# Patient Record
Sex: FEMALE | Race: WHITE | NOT HISPANIC OR LATINO | Employment: OTHER | URBAN - METROPOLITAN AREA
[De-identification: names, ages, dates, MRNs, and addresses within clinical notes are randomized per-mention and may not be internally consistent; named-entity substitution may affect disease eponyms.]

---

## 2017-01-06 ENCOUNTER — OFFICE VISIT (OUTPATIENT)
Dept: FAMILY MEDICINE CLINIC | Facility: CLINIC | Age: 52
End: 2017-01-06

## 2017-01-06 VITALS
TEMPERATURE: 98.5 F | SYSTOLIC BLOOD PRESSURE: 128 MMHG | HEART RATE: 100 BPM | BODY MASS INDEX: 29.26 KG/M2 | HEIGHT: 62 IN | DIASTOLIC BLOOD PRESSURE: 78 MMHG | WEIGHT: 159 LBS

## 2017-01-06 DIAGNOSIS — F41.9 ANXIETY: ICD-10-CM

## 2017-01-06 DIAGNOSIS — I10 BENIGN ESSENTIAL HYPERTENSION: ICD-10-CM

## 2017-01-06 DIAGNOSIS — IMO0001 UNCONTROLLED TYPE 2 DIABETES MELLITUS WITHOUT COMPLICATION, WITHOUT LONG-TERM CURRENT USE OF INSULIN: Primary | ICD-10-CM

## 2017-01-06 DIAGNOSIS — G89.29 CHRONIC BILATERAL LOW BACK PAIN WITHOUT SCIATICA: ICD-10-CM

## 2017-01-06 DIAGNOSIS — M54.50 CHRONIC BILATERAL LOW BACK PAIN WITHOUT SCIATICA: ICD-10-CM

## 2017-01-06 DIAGNOSIS — R11.0 NAUSEA: ICD-10-CM

## 2017-01-06 PROCEDURE — 99214 OFFICE O/P EST MOD 30 MIN: CPT | Performed by: FAMILY MEDICINE

## 2017-01-06 RX ORDER — GLIMEPIRIDE 4 MG/1
4 TABLET ORAL
Qty: 30 TABLET | Refills: 5 | Status: SHIPPED | OUTPATIENT
Start: 2017-01-06 | End: 2017-07-14 | Stop reason: SDUPTHER

## 2017-01-06 RX ORDER — NORTRIPTYLINE HYDROCHLORIDE 25 MG/1
25 CAPSULE ORAL NIGHTLY
COMMUNITY
End: 2017-07-14 | Stop reason: SDUPTHER

## 2017-01-06 RX ORDER — ONDANSETRON HYDROCHLORIDE 8 MG/1
8 TABLET, FILM COATED ORAL EVERY 8 HOURS PRN
Qty: 15 TABLET | Refills: 2 | Status: SHIPPED | OUTPATIENT
Start: 2017-01-06 | End: 2019-02-15

## 2017-01-06 RX ORDER — DIAZEPAM 10 MG/1
10 TABLET ORAL EVERY 8 HOURS PRN
Qty: 75 TABLET | Refills: 4 | Status: SHIPPED | OUTPATIENT
Start: 2017-01-06 | End: 2017-07-14 | Stop reason: SDUPTHER

## 2017-01-06 RX ORDER — HYDROCODONE BITARTRATE AND ACETAMINOPHEN 10; 325 MG/1; MG/1
0.5 TABLET ORAL EVERY 4 HOURS PRN
COMMUNITY
End: 2018-12-24

## 2017-01-06 NOTE — MR AVS SNAPSHOT
Raquel Espitia   1/6/2017 3:45 PM   Office Visit    Dept Phone:  [phone]   Encounter #:  [id number]    Provider:  Vikram Stewart MD   Department:  [facility]                Your Full Care Plan              Today's Medication Changes          These changes are accurate as of: 1/6/17  4:24 PM.  If you have any questions, ask your nurse or doctor.               Medication(s)that have changed:     diazePAM 10 MG tablet   Commonly known as:  VALIUM   Take 1 tablet by mouth Every 8 (Eight) Hours As Needed for anxiety.   What changed:  See the new instructions.   Changed by:  Vikram Stewart MD       HYDROcodone-acetaminophen  MG per tablet   Commonly known as:  NORCO   Take 0.5 tablets by mouth Every 4 (Four) Hours As Needed for moderate pain (4-6).   What changed:  Another medication with the same name was removed. Continue taking this medication, and follow the directions you see here.   Changed by:  Vikram Stewart MD         Stop taking medication(s)listed here:     diclofenac 50 MG EC tablet   Commonly known as:  VOLTAREN   Stopped by:  Vikram Stewart MD                Where to Get Your Medications      You can get these medications from any pharmacy     Bring a paper prescription for each of these medications     diazePAM 10 MG tablet                  Your Updated Medication List          This list is accurate as of: 1/6/17  4:24 PM.  Always use your most recent med list.                diazePAM 10 MG tablet   Commonly known as:  VALIUM   Take 1 tablet by mouth Every 8 (Eight) Hours As Needed for anxiety.       gabapentin 600 MG tablet   Commonly known as:  NEURONTIN   Take 1 tablet by mouth 3 (three) times a day.       glimepiride 4 MG tablet   Commonly known as:  AMARYL   Take 1 tablet by mouth Every Morning Before Breakfast.       HYDROcodone-acetaminophen  MG per tablet   Commonly known as:   NORCO       metFORMIN 500 MG tablet   Commonly known as:  GLUCOPHAGE       nortriptyline 25 MG capsule   Commonly known as:  PAMELOR       omeprazole 40 MG capsule   Commonly known as:  priLOSEC       ondansetron 8 MG tablet   Commonly known as:  ZOFRAN   Take 1 tablet by mouth Every 8 (Eight) Hours As Needed for nausea or vomiting.       promethazine-codeine 6.25-10 MG/5ML syrup   Commonly known as:  PHENERGAN with CODEINE   Take 5 mL by mouth Every 4 (Four) Hours As Needed for cough.       sennosides-docusate sodium 8.6-50 MG tablet   Commonly known as:  SENOKOT-S   Take 1 tablet by mouth 2 (Two) Times a Day Before Meals.       verapamil  MG CR tablet   Commonly known as:  CALAN-SR   Take 1 tablet by mouth every night.               Instructions     None    Patient Instructions History      Upcoming Appointments     Visit Type Date Time Department    OFFICE VISIT 2017  3:45 PM [facility] [employer] Signup     [facility] allows you to send messages to your doctor, view your test results, renew your prescriptions, schedule appointments, and more. To sign up, go to [employer] and click on the Sign Up Now link in the New User? box. Enter your [facility] Activation Code exactly as it appears below along with the last four digits of your Social Security Number and your Date of Birth () to complete the sign-up process. If you do not sign up before the expiration date, you must request a new code.    [facility] Activation Code: [email or web address]  Expires: 2017  4:24 PM    If you have questions, you can email [email or web address] or call [phone] to talk to our [facility] staff. Remember, [facility] is NOT to be used for urgent needs. For medical emergencies, dial 911.               Other Info from Your Visit           Allergies     No Known Allergies      Reason for Visit     Anxiety refills on valium    Nausea refills on zofran    Diabetes refills on glimepiride     "Hypertension refills on verapamil    Back Pain Pt is in a pain clinic now, she is now on Norco 10/325 and she is suppose to break in half and take every 4 hours, also on Nortriptyline 25, and she thinks she might be on SOMA 350 she does not have this on her.        Vital Signs     Blood Pressure Pulse Temperature Height Weight Body Mass Index    128/78 100 98.5 °F (36.9 °C) 62\" (157.5 cm) 159 lb (72.1 kg) 29.08 kg/m2    Smoking Status                   Current Every Day Smoker             "

## 2017-01-06 NOTE — PROGRESS NOTES
Subjective   Raquel Espitia is a 51 y.o. female    HPI Comments: Patient presents today for recheck associated with her multiple medical problems including type 2 diabetes mellitus, hypertension, chronic back pain, nausea and chronic anxiety.  She is now being treated at the pain treatment center and requests a referral to an endocrinologist for her diabetes.  She is due for refills on multiple medications.  She also informs me that her mother who was a patient here, Nelia Abrams, recently passed away.  She had been in a nursing home after a stroke and apparently had another stroke.    Anxiety   Symptoms include nausea. Patient reports no chest pain, confusion, decreased concentration, dizziness, nervous/anxious behavior, palpitations, shortness of breath or suicidal ideas.       Nausea   Associated symptoms include nausea. Pertinent negatives include no abdominal pain, arthralgias, chest pain, coughing, diaphoresis, fatigue, headaches, myalgias, numbness, rash, vomiting or weakness.   Diabetes   Pertinent negatives for hypoglycemia include no confusion, dizziness, headaches, nervousness/anxiousness or tremors. Pertinent negatives for diabetes include no chest pain, no fatigue, no polydipsia, no polyphagia, no polyuria and no weakness.   Hypertension   Associated symptoms include anxiety. Pertinent negatives include no chest pain, headaches, palpitations or shortness of breath.   Back Pain   Pertinent negatives include no abdominal pain, chest pain, headaches, numbness or weakness.       The following portions of the patient's history were reviewed and updated as appropriate: allergies, current medications, past social history and problem list    Review of Systems   Constitutional: Negative.  Negative for appetite change, diaphoresis, fatigue and unexpected weight change.   Eyes: Negative for visual disturbance.   Respiratory: Negative.  Negative for cough, chest tightness and shortness of breath.    Cardiovascular:  Negative for chest pain, palpitations and leg swelling.   Gastrointestinal: Positive for nausea. Negative for abdominal pain, diarrhea and vomiting.   Endocrine: Negative for polydipsia, polyphagia and polyuria.   Musculoskeletal: Positive for back pain. Negative for arthralgias, gait problem and myalgias.   Skin: Negative for color change and rash.   Neurological: Negative for dizziness, tremors, syncope, weakness, light-headedness, numbness and headaches.   Psychiatric/Behavioral: Negative for agitation, behavioral problems, confusion, decreased concentration, dysphoric mood, sleep disturbance and suicidal ideas. The patient is not nervous/anxious.        Objective     Vitals:    01/06/17 1543   BP: 128/78   Pulse: 100   Temp: 98.5 °F (36.9 °C)       Physical Exam   Constitutional: She is oriented to person, place, and time. She appears well-developed and well-nourished.   Neck: Neck supple. No JVD present. No thyromegaly present.   Cardiovascular: Normal rate, regular rhythm, normal heart sounds, intact distal pulses and normal pulses.    No murmur heard.  Pulmonary/Chest: Effort normal and breath sounds normal. No respiratory distress.   Abdominal: Soft. Bowel sounds are normal. There is no hepatosplenomegaly. There is no tenderness.   Musculoskeletal: She exhibits no edema.        Lumbar back: She exhibits decreased range of motion, tenderness, bony tenderness and pain. She exhibits no swelling, no deformity and no spasm.   Lymphadenopathy:     She has no cervical adenopathy.   Neurological: She is alert and oriented to person, place, and time. She has normal reflexes. No sensory deficit.   Skin: Skin is warm and dry. She is not diaphoretic.   Psychiatric: Her speech is normal and behavior is normal. Her mood appears anxious. She exhibits a depressed mood.   Nursing note and vitals reviewed.      Assessment/Plan   Problem List Items Addressed This Visit        Cardiovascular and Mediastinum    Benign essential  hypertension    Relevant Medications    verapamil SR (CALAN-SR) 180 MG CR tablet       Endocrine    DM (diabetes mellitus), type 2, uncontrolled - Primary    Relevant Medications    glimepiride (AMARYL) 4 MG tablet    Other Relevant Orders    Ambulatory Referral to Endocrinology (Completed)       Nervous and Auditory    Lumbago    Relevant Medications    HYDROcodone-acetaminophen (NORCO)  MG per tablet      Other Visit Diagnoses     Anxiety        Relevant Medications    nortriptyline (PAMELOR) 25 MG capsule    diazePAM (VALIUM) 10 MG tablet    Nausea        Relevant Medications    ondansetron (ZOFRAN) 8 MG tablet

## 2017-07-14 ENCOUNTER — OFFICE VISIT (OUTPATIENT)
Dept: FAMILY MEDICINE CLINIC | Facility: CLINIC | Age: 52
End: 2017-07-14

## 2017-07-14 VITALS
SYSTOLIC BLOOD PRESSURE: 120 MMHG | OXYGEN SATURATION: 98 % | TEMPERATURE: 97.7 F | HEIGHT: 62 IN | DIASTOLIC BLOOD PRESSURE: 78 MMHG | HEART RATE: 88 BPM | WEIGHT: 175 LBS | BODY MASS INDEX: 32.2 KG/M2

## 2017-07-14 DIAGNOSIS — M54.50 CHRONIC MIDLINE LOW BACK PAIN WITHOUT SCIATICA: ICD-10-CM

## 2017-07-14 DIAGNOSIS — Z71.6 TOBACCO ABUSE COUNSELING: ICD-10-CM

## 2017-07-14 DIAGNOSIS — I10 BENIGN ESSENTIAL HYPERTENSION: ICD-10-CM

## 2017-07-14 DIAGNOSIS — G89.29 CHRONIC MIDLINE LOW BACK PAIN WITHOUT SCIATICA: ICD-10-CM

## 2017-07-14 DIAGNOSIS — F41.9 ANXIETY: ICD-10-CM

## 2017-07-14 DIAGNOSIS — K21.9 GASTROESOPHAGEAL REFLUX DISEASE, ESOPHAGITIS PRESENCE NOT SPECIFIED: ICD-10-CM

## 2017-07-14 DIAGNOSIS — Z72.0 TOBACCO ABUSE: Primary | ICD-10-CM

## 2017-07-14 DIAGNOSIS — IMO0001 UNCONTROLLED TYPE 2 DIABETES MELLITUS WITHOUT COMPLICATION, WITHOUT LONG-TERM CURRENT USE OF INSULIN: ICD-10-CM

## 2017-07-14 PROCEDURE — 99214 OFFICE O/P EST MOD 30 MIN: CPT | Performed by: FAMILY MEDICINE

## 2017-07-14 PROCEDURE — 99406 BEHAV CHNG SMOKING 3-10 MIN: CPT | Performed by: FAMILY MEDICINE

## 2017-07-14 RX ORDER — DIAZEPAM 10 MG/1
10 TABLET ORAL EVERY 8 HOURS PRN
Qty: 75 TABLET | Refills: 5 | Status: SHIPPED | OUTPATIENT
Start: 2017-07-14 | End: 2018-01-22 | Stop reason: SDUPTHER

## 2017-07-14 RX ORDER — GABAPENTIN 600 MG/1
600 TABLET ORAL 3 TIMES DAILY
Qty: 90 TABLET | Refills: 5 | Status: SHIPPED | OUTPATIENT
Start: 2017-07-14 | End: 2018-01-22 | Stop reason: SDUPTHER

## 2017-07-14 RX ORDER — OMEPRAZOLE 40 MG/1
40 CAPSULE, DELAYED RELEASE ORAL DAILY
Qty: 30 CAPSULE | Refills: 11 | Status: SHIPPED | OUTPATIENT
Start: 2017-07-14 | End: 2018-08-16 | Stop reason: SDUPTHER

## 2017-07-14 RX ORDER — GLIMEPIRIDE 4 MG/1
4 TABLET ORAL
Qty: 30 TABLET | Refills: 11 | Status: SHIPPED | OUTPATIENT
Start: 2017-07-14 | End: 2018-01-22 | Stop reason: SDUPTHER

## 2017-07-14 RX ORDER — NORTRIPTYLINE HYDROCHLORIDE 25 MG/1
25 CAPSULE ORAL NIGHTLY
Qty: 30 CAPSULE | Refills: 11 | Status: SHIPPED | OUTPATIENT
Start: 2017-07-14 | End: 2018-08-16 | Stop reason: SDUPTHER

## 2017-07-14 NOTE — PROGRESS NOTES
Subjective   Raquel Espitia is a 52 y.o. female    HPI Comments: Patient presents today for 6 month recheck regarding chronic anxiety, diabetic neuropathy, type 2 diabetes mellitus, hypertension and GERD.  She is due for refills on her medications.  She is followed at the pain treatment center for chronic back pain.  She has signed a controlled substances agreement with us today.  Her Brandon is reviewed and is appropriate at this time.  She reports no adverse effects with her current medication.    3 minute discussion with patient regarding smoking cessation.  Long-term complications of smoking are discussed including lung issues with COPD/emphysema and lung cancer and cardiovascular disease with coronary artery disease, peripheral artery disease and stroke.  Adjuncts for smoking cessation including nicotine replacement with patches, lozenges or gum as well as prescription medications including bupropion and Chantix are offered to the patient.    Anxiety   Symptoms include nervous/anxious behavior. Patient reports no chest pain, confusion, decreased concentration, dizziness, nausea, palpitations, shortness of breath or suicidal ideas.       Diabetes   Hypoglycemia symptoms include nervousness/anxiousness. Pertinent negatives for hypoglycemia include no confusion, dizziness, headaches or tremors. Pertinent negatives for diabetes include no chest pain, no fatigue, no polydipsia, no polyphagia, no polyuria and no weakness.   Hypertension   Associated symptoms include anxiety. Pertinent negatives include no chest pain, headaches, palpitations or shortness of breath.   Heartburn   She reports no abdominal pain, no chest pain, no coughing or no nausea. Pertinent negatives include no fatigue.       The following portions of the patient's history were reviewed and updated as appropriate: allergies, current medications, past social history and problem list    Review of Systems   Constitutional: Negative.  Negative for  appetite change, diaphoresis, fatigue and unexpected weight change.   Eyes: Negative for visual disturbance.   Respiratory: Negative.  Negative for cough, chest tightness and shortness of breath.    Cardiovascular: Negative for chest pain, palpitations and leg swelling.   Gastrointestinal: Negative.  Negative for abdominal distention, abdominal pain, diarrhea, nausea and vomiting.        Patient experiencing heartburn/acid reflux     Endocrine: Negative for polydipsia, polyphagia and polyuria.   Musculoskeletal: Positive for back pain. Negative for arthralgias, gait problem and myalgias.   Skin: Negative for color change and rash.   Neurological: Negative for dizziness, tremors, syncope, weakness, light-headedness, numbness and headaches.   Psychiatric/Behavioral: Positive for dysphoric mood and sleep disturbance. Negative for agitation, behavioral problems, confusion, decreased concentration and suicidal ideas. The patient is nervous/anxious.        Objective     Vitals:    07/14/17 1501   BP: 120/78   Pulse: 88   Temp: 97.7 °F (36.5 °C)   SpO2: 98%       Physical Exam   Constitutional: She is oriented to person, place, and time. She appears well-developed and well-nourished.   HENT:   Head: Normocephalic.   Mouth/Throat: Oropharynx is clear and moist.   Eyes: Conjunctivae are normal. Pupils are equal, round, and reactive to light.   Neck: Neck supple. No JVD present. No thyromegaly present.   Cardiovascular: Normal rate, regular rhythm, normal heart sounds, intact distal pulses and normal pulses.    No murmur heard.  Pulmonary/Chest: Effort normal and breath sounds normal. No respiratory distress.   Abdominal: Soft. Bowel sounds are normal. There is no hepatosplenomegaly. There is no tenderness.   Musculoskeletal: She exhibits no edema.        Lumbar back: She exhibits decreased range of motion, tenderness, bony tenderness and pain. She exhibits no swelling, no deformity and no spasm.   Lymphadenopathy:     She  has no cervical adenopathy.   Neurological: She is alert and oriented to person, place, and time. She has normal reflexes. No sensory deficit.   Skin: Skin is warm and dry. No rash noted. She is not diaphoretic.   Psychiatric: She has a normal mood and affect. Her behavior is normal.   Nursing note and vitals reviewed.      Assessment/Plan   Problem List Items Addressed This Visit        Cardiovascular and Mediastinum    Benign essential hypertension    Relevant Medications    verapamil SR (CALAN-SR) 180 MG CR tablet       Digestive    Gastroesophageal reflux disease    Relevant Medications    omeprazole (priLOSEC) 40 MG capsule       Endocrine    DM (diabetes mellitus), type 2, uncontrolled    Relevant Medications    glimepiride (AMARYL) 4 MG tablet       Nervous and Auditory    Lumbago       Other    Tobacco abuse - Primary    Relevant Medications    varenicline (CHANTIX SHANDA) 0.5 MG X 11 & 1 MG X 42 tablet    Tobacco abuse counseling    Anxiety    Relevant Medications    diazePAM (VALIUM) 10 MG tablet    nortriptyline (PAMELOR) 25 MG capsule

## 2017-07-15 PROBLEM — K21.9 GASTROESOPHAGEAL REFLUX DISEASE: Status: ACTIVE | Noted: 2017-07-15

## 2018-01-22 ENCOUNTER — OFFICE VISIT (OUTPATIENT)
Dept: FAMILY MEDICINE CLINIC | Facility: CLINIC | Age: 53
End: 2018-01-22

## 2018-01-22 VITALS
WEIGHT: 185 LBS | DIASTOLIC BLOOD PRESSURE: 76 MMHG | HEART RATE: 103 BPM | OXYGEN SATURATION: 99 % | RESPIRATION RATE: 16 BRPM | BODY MASS INDEX: 34.04 KG/M2 | HEIGHT: 62 IN | SYSTOLIC BLOOD PRESSURE: 118 MMHG

## 2018-01-22 DIAGNOSIS — K21.9 GASTROESOPHAGEAL REFLUX DISEASE, ESOPHAGITIS PRESENCE NOT SPECIFIED: ICD-10-CM

## 2018-01-22 DIAGNOSIS — J40 BRONCHITIS: ICD-10-CM

## 2018-01-22 DIAGNOSIS — IMO0001 UNCONTROLLED TYPE 2 DIABETES MELLITUS WITHOUT COMPLICATION, WITHOUT LONG-TERM CURRENT USE OF INSULIN: ICD-10-CM

## 2018-01-22 DIAGNOSIS — I10 BENIGN ESSENTIAL HYPERTENSION: ICD-10-CM

## 2018-01-22 DIAGNOSIS — F41.9 ANXIETY: Primary | ICD-10-CM

## 2018-01-22 PROCEDURE — 99214 OFFICE O/P EST MOD 30 MIN: CPT | Performed by: FAMILY MEDICINE

## 2018-01-22 RX ORDER — GLIMEPIRIDE 4 MG/1
4 TABLET ORAL
Qty: 30 TABLET | Refills: 11 | Status: SHIPPED | OUTPATIENT
Start: 2018-01-22 | End: 2018-01-22 | Stop reason: SDUPTHER

## 2018-01-22 RX ORDER — AMOXICILLIN 500 MG/1
1000 CAPSULE ORAL 3 TIMES DAILY
Qty: 30 CAPSULE | Refills: 0 | Status: SHIPPED | OUTPATIENT
Start: 2018-01-22 | End: 2018-08-16

## 2018-01-22 RX ORDER — GABAPENTIN 600 MG/1
600 TABLET ORAL 3 TIMES DAILY
Qty: 90 TABLET | Refills: 5 | Status: SHIPPED | OUTPATIENT
Start: 2018-01-22 | End: 2018-12-24

## 2018-01-22 RX ORDER — DEXTROMETHORPHAN HYDROBROMIDE AND PROMETHAZINE HYDROCHLORIDE 15; 6.25 MG/5ML; MG/5ML
5 SYRUP ORAL 4 TIMES DAILY PRN
Qty: 240 ML | Refills: 0 | Status: SHIPPED | OUTPATIENT
Start: 2018-01-22 | End: 2019-02-15

## 2018-01-22 RX ORDER — GLIMEPIRIDE 4 MG/1
4 TABLET ORAL
Qty: 30 TABLET | Refills: 11 | Status: SHIPPED | OUTPATIENT
Start: 2018-01-22 | End: 2018-08-16 | Stop reason: SDUPTHER

## 2018-01-22 RX ORDER — DIAZEPAM 10 MG/1
10 TABLET ORAL EVERY 8 HOURS PRN
Qty: 75 TABLET | Refills: 5 | Status: SHIPPED | OUTPATIENT
Start: 2018-01-22 | End: 2018-08-16 | Stop reason: SDUPTHER

## 2018-01-22 NOTE — PROGRESS NOTES
Subjective   Raquel Espitia is a 52 y.o. female    HPI Comments: Patient presents for 6 month recheck regarding hypertension, diabetes mellitus type 2, anxiety disorder, GERD and chronic back pain.  She continues to see pain management for opioid therapy.  Her Brandon is reviewed and is appropriate.  She is due for several refills today.  She complains of productive cough with chest congestion and occasional wheezing.  She continues to smoke cigarettes despite previous counseling regarding smoking cessation.  She denies fever, chills, arthralgias or myalgias.      The following portions of the patient's history were reviewed and updated as appropriate: allergies, current medications, past social history and problem list    Review of Systems   Constitutional: Negative.  Negative for appetite change, chills, diaphoresis, fatigue, fever and unexpected weight change.   HENT: Positive for congestion and postnasal drip. Negative for sinus pain, sore throat and voice change.    Eyes: Negative.  Negative for visual disturbance.   Respiratory: Positive for cough and wheezing. Negative for chest tightness and shortness of breath.    Cardiovascular: Negative for chest pain, palpitations and leg swelling.   Gastrointestinal: Negative.  Negative for abdominal pain, diarrhea, nausea and vomiting.   Endocrine: Negative for polydipsia, polyphagia and polyuria.   Genitourinary: Negative.    Musculoskeletal: Positive for back pain. Negative for arthralgias, gait problem and myalgias.   Skin: Negative for color change and rash.   Neurological: Negative for dizziness, tremors, syncope, weakness, light-headedness, numbness and headaches.   Psychiatric/Behavioral: Positive for dysphoric mood and sleep disturbance. Negative for agitation, behavioral problems, confusion, decreased concentration and suicidal ideas. The patient is nervous/anxious.        Objective     Vitals:    01/22/18 0850   BP: 118/76   Pulse: 103   Resp: 16   SpO2: 99%        Physical Exam   Constitutional: She is oriented to person, place, and time. She appears well-developed and well-nourished. No distress.   HENT:   Head: Normocephalic and atraumatic.   Nose: Nose normal.   Mouth/Throat: Oropharynx is clear and moist.   Neck: Neck supple. No JVD present. No thyromegaly present.   Cardiovascular: Normal rate, regular rhythm, normal heart sounds, intact distal pulses and normal pulses.    No murmur heard.  Pulmonary/Chest: Effort normal. No stridor. No respiratory distress. She has wheezes.   Abdominal: Soft. Bowel sounds are normal. There is no hepatosplenomegaly. There is no tenderness.   Musculoskeletal: She exhibits no edema.        Lumbar back: She exhibits decreased range of motion, tenderness, bony tenderness and pain. She exhibits no swelling, no deformity and no spasm.   Lymphadenopathy:     She has no cervical adenopathy.   Neurological: She is alert and oriented to person, place, and time. She has normal reflexes. No sensory deficit.   Skin: Skin is warm and dry. She is not diaphoretic.   Psychiatric: She has a normal mood and affect. Her behavior is normal.   Nursing note and vitals reviewed.      Assessment/Plan   Problem List Items Addressed This Visit        Cardiovascular and Mediastinum    Benign essential hypertension       Digestive    Gastroesophageal reflux disease       Endocrine    DM (diabetes mellitus), type 2, uncontrolled    Relevant Medications    glimepiride (AMARYL) 4 MG tablet    metFORMIN (GLUCOPHAGE) 1000 MG tablet       Other    Anxiety - Primary    Relevant Medications    diazePAM (VALIUM) 10 MG tablet      Other Visit Diagnoses     Bronchitis        Relevant Medications    promethazine-dextromethorphan (PROMETHAZINE-DM) 6.25-15 MG/5ML syrup

## 2018-07-26 ENCOUNTER — TELEPHONE (OUTPATIENT)
Dept: FAMILY MEDICINE CLINIC | Facility: CLINIC | Age: 53
End: 2018-07-26

## 2018-07-26 NOTE — TELEPHONE ENCOUNTER
----- Message from Didi Peguero sent at 7/26/2018  1:45 PM EDT -----  Contact: PT  NEED DIAZEPAM REFILL, OUT OF THEM TOMORROW    [facility] - [place], [place] - [address] [facility] - [phone]  - [phone] (Phone)  [phone] (Fax)    HAS APPT 8/16 WITH DR FRANZ

## 2018-08-16 ENCOUNTER — OFFICE VISIT (OUTPATIENT)
Dept: FAMILY MEDICINE CLINIC | Facility: CLINIC | Age: 53
End: 2018-08-16

## 2018-08-16 VITALS
WEIGHT: 183 LBS | HEIGHT: 62 IN | OXYGEN SATURATION: 98 % | SYSTOLIC BLOOD PRESSURE: 152 MMHG | TEMPERATURE: 98 F | DIASTOLIC BLOOD PRESSURE: 80 MMHG | HEART RATE: 96 BPM | BODY MASS INDEX: 33.68 KG/M2

## 2018-08-16 DIAGNOSIS — K21.9 GASTROESOPHAGEAL REFLUX DISEASE, ESOPHAGITIS PRESENCE NOT SPECIFIED: ICD-10-CM

## 2018-08-16 DIAGNOSIS — I10 BENIGN ESSENTIAL HYPERTENSION: ICD-10-CM

## 2018-08-16 DIAGNOSIS — IMO0001 UNCONTROLLED TYPE 2 DIABETES MELLITUS WITHOUT COMPLICATION, WITHOUT LONG-TERM CURRENT USE OF INSULIN: ICD-10-CM

## 2018-08-16 DIAGNOSIS — G47.00 INSOMNIA, UNSPECIFIED TYPE: ICD-10-CM

## 2018-08-16 DIAGNOSIS — F41.9 ANXIETY: Primary | ICD-10-CM

## 2018-08-16 LAB
GLUCOSE BLDC GLUCOMTR-MCNC: 269 MG/DL (ref 70–130)
HBA1C MFR BLD: 10 %

## 2018-08-16 PROCEDURE — 83036 HEMOGLOBIN GLYCOSYLATED A1C: CPT | Performed by: FAMILY MEDICINE

## 2018-08-16 PROCEDURE — 82962 GLUCOSE BLOOD TEST: CPT | Performed by: FAMILY MEDICINE

## 2018-08-16 PROCEDURE — 99214 OFFICE O/P EST MOD 30 MIN: CPT | Performed by: FAMILY MEDICINE

## 2018-08-16 RX ORDER — NORTRIPTYLINE HYDROCHLORIDE 25 MG/1
25 CAPSULE ORAL NIGHTLY
Qty: 30 CAPSULE | Refills: 11 | Status: SHIPPED | OUTPATIENT
Start: 2018-08-16 | End: 2022-01-01 | Stop reason: HOSPADM

## 2018-08-16 RX ORDER — GLIMEPIRIDE 4 MG/1
4 TABLET ORAL 2 TIMES DAILY WITH MEALS
Qty: 60 TABLET | Refills: 11 | Status: SHIPPED | OUTPATIENT
Start: 2018-08-16 | End: 2019-06-05 | Stop reason: SDUPTHER

## 2018-08-16 RX ORDER — OMEPRAZOLE 40 MG/1
40 CAPSULE, DELAYED RELEASE ORAL DAILY
Qty: 30 CAPSULE | Refills: 11 | Status: SHIPPED | OUTPATIENT
Start: 2018-08-16 | End: 2019-06-05 | Stop reason: SDUPTHER

## 2018-08-16 RX ORDER — DIAZEPAM 10 MG/1
10 TABLET ORAL EVERY 8 HOURS PRN
Qty: 75 TABLET | Refills: 5 | Status: SHIPPED | OUTPATIENT
Start: 2018-08-16 | End: 2019-03-07 | Stop reason: SDUPTHER

## 2018-08-16 NOTE — PROGRESS NOTES
Subjective   Raquel Espitia is a 53 y.o. female    Patient presents for 6 month recheck and refill of prescriptions but reports she has not been feeling well for the past 4-6 weeks.  She has had symptoms of mental confusion and lack of concentration.  Her blood sugars and blood pressure readings have been elevated.  She has been compliant with her medications although she did stop her verapamil several months ago.  Her hemoglobin A1c today is 10.0% with a random glucose of 269.  She reports that she is having a lot of stress which is not unusual for her.  She is afraid to drive because of her confusion episodes.  Extended discussion with the patient regarding the need for blood pressure and let sugar control.  She is very well aware of the ramifications of these diseases as she unfortunately had to witness her mother having strokes and basically becoming incapacitated and her last years of life.  These were due to hypertension and diabetes.  We discussed improving her diet significantly and beginning some exercise seriously.  We will restart her verapamil for hypertension and increase her glimepiride.  Other prescriptions will be refilled today.  We will see her back in 2-3 months.      Anxiety   Symptoms include nervous/anxious behavior. Patient reports no chest pain, confusion, decreased concentration, dizziness, nausea, palpitations, shortness of breath or suicidal ideas.       Diabetes   Hypoglycemia symptoms include nervousness/anxiousness. Pertinent negatives for hypoglycemia include no confusion, dizziness, headaches or tremors. Pertinent negatives for diabetes include no chest pain, no fatigue, no polydipsia, no polyphagia, no polyuria and no weakness.   Heartburn   She reports no abdominal pain, no chest pain, no coughing or no nausea. Pertinent negatives include no fatigue.   Hypertension   Associated symptoms include anxiety. Pertinent negatives include no chest pain, headaches, palpitations or shortness  of breath.       The following portions of the patient's history were reviewed and updated as appropriate: allergies, current medications, past social history and problem list    Review of Systems   Constitutional: Negative for appetite change, diaphoresis, fatigue and unexpected weight change.   Eyes: Negative for visual disturbance.   Respiratory: Negative for cough, chest tightness and shortness of breath.    Cardiovascular: Negative for chest pain, palpitations and leg swelling.   Gastrointestinal: Negative for abdominal distention, abdominal pain, diarrhea, nausea and vomiting.        Patient experiencing heartburn/acid reflux     Endocrine: Negative for polydipsia, polyphagia and polyuria.   Skin: Negative for color change and rash.   Neurological: Negative for dizziness, tremors, syncope, weakness, light-headedness, numbness and headaches.   Psychiatric/Behavioral: Positive for dysphoric mood and sleep disturbance. Negative for agitation, behavioral problems, confusion, decreased concentration and suicidal ideas. The patient is nervous/anxious.        Objective     Vitals:    08/16/18 1115   BP: 152/80   Pulse: 96   Temp: 98 °F (36.7 °C)   SpO2: 98%       Physical Exam   Constitutional: She is oriented to person, place, and time. She appears well-developed and well-nourished.   HENT:   Head: Normocephalic.   Mouth/Throat: Oropharynx is clear and moist.   Eyes: Pupils are equal, round, and reactive to light. Conjunctivae are normal.   Neck: Neck supple. No JVD present. No thyromegaly present.   Cardiovascular: Normal rate, regular rhythm, normal heart sounds, intact distal pulses and normal pulses.    No murmur heard.  Pulmonary/Chest: Effort normal and breath sounds normal. No respiratory distress.   Abdominal: Soft. Bowel sounds are normal. There is no hepatosplenomegaly. There is no tenderness.   Musculoskeletal: She exhibits no edema.   Lymphadenopathy:     She has no cervical adenopathy.   Neurological:  She is alert and oriented to person, place, and time. No sensory deficit.   Skin: Skin is warm and dry. No rash noted. She is not diaphoretic.   Psychiatric: She has a normal mood and affect. Her behavior is normal.   Nursing note and vitals reviewed.      Assessment/Plan   Problem List Items Addressed This Visit        Cardiovascular and Mediastinum    Benign essential hypertension    Relevant Medications    verapamil SR (CALAN-SR) 180 MG CR tablet       Digestive    Gastroesophageal reflux disease    Relevant Medications    omeprazole (priLOSEC) 40 MG capsule       Endocrine    DM (diabetes mellitus), type 2, uncontrolled (CMS/[facility])    Relevant Medications    glimepiride (AMARYL) 4 MG tablet    metFORMIN (GLUCOPHAGE) 1000 MG tablet    Other Relevant Orders    POC Glucose (Completed)    POC Glycosylated Hemoglobin (Hb A1C) (Completed)       Other    Anxiety - Primary    Relevant Medications    nortriptyline (PAMELOR) 25 MG capsule    diazePAM (VALIUM) 10 MG tablet      Other Visit Diagnoses     Insomnia, unspecified type

## 2018-11-15 ENCOUNTER — OFFICE VISIT (OUTPATIENT)
Dept: FAMILY MEDICINE CLINIC | Facility: CLINIC | Age: 53
End: 2018-11-15

## 2018-11-15 VITALS
HEIGHT: 62 IN | HEART RATE: 105 BPM | WEIGHT: 169 LBS | DIASTOLIC BLOOD PRESSURE: 68 MMHG | BODY MASS INDEX: 31.1 KG/M2 | SYSTOLIC BLOOD PRESSURE: 124 MMHG | OXYGEN SATURATION: 98 % | TEMPERATURE: 99.6 F

## 2018-11-15 DIAGNOSIS — I10 BENIGN ESSENTIAL HYPERTENSION: ICD-10-CM

## 2018-11-15 DIAGNOSIS — E11.9 TYPE 2 DIABETES MELLITUS WITHOUT COMPLICATION, WITHOUT LONG-TERM CURRENT USE OF INSULIN (HCC): Primary | ICD-10-CM

## 2018-11-15 LAB
GLUCOSE BLDC GLUCOMTR-MCNC: 324 MG/DL (ref 70–130)
HBA1C MFR BLD: 10 %

## 2018-11-15 PROCEDURE — 83036 HEMOGLOBIN GLYCOSYLATED A1C: CPT | Performed by: FAMILY MEDICINE

## 2018-11-15 PROCEDURE — 99213 OFFICE O/P EST LOW 20 MIN: CPT | Performed by: FAMILY MEDICINE

## 2018-11-15 PROCEDURE — 82962 GLUCOSE BLOOD TEST: CPT | Performed by: FAMILY MEDICINE

## 2018-11-15 RX ORDER — VERAPAMIL HYDROCHLORIDE 240 MG/1
240 TABLET, FILM COATED, EXTENDED RELEASE ORAL NIGHTLY
Qty: 30 TABLET | Refills: 5 | Status: CANCELLED | OUTPATIENT
Start: 2018-11-15

## 2018-11-15 RX ORDER — HYDROCODONE BITARTRATE AND ACETAMINOPHEN 10; 325 MG/1; MG/1
1 TABLET ORAL 4 TIMES DAILY PRN
Refills: 0 | Status: ON HOLD | COMMUNITY
Start: 2018-09-19 | End: 2022-01-01

## 2018-11-15 RX ORDER — RANITIDINE 150 MG/1
TABLET ORAL
Refills: 0 | COMMUNITY
Start: 2018-08-17 | End: 2019-11-22

## 2018-11-15 RX ORDER — TIZANIDINE 4 MG/1
4 TABLET ORAL 2 TIMES DAILY
Refills: 0 | COMMUNITY
Start: 2018-08-17 | End: 2019-07-09 | Stop reason: SDUPTHER

## 2018-11-16 NOTE — PROGRESS NOTES
Subjective   Raquel Espitia is a 53 y.o. female    Chief Complaint    High blood pressure  Uncontrolled diabetes        History of Present Illness   Patient presents today due to elevated blood glucose levels and elevated blood pressure levels despite taking all of her prescribed medications.  She admits that she is compliant with her diet and does not exercise. A1c today is 10.0% and random glucose is 324.    The following portions of the patient's history were reviewed and updated as appropriate: allergies, current medications, past social history and problem list    Review of Systems   Constitutional: Negative for appetite change, diaphoresis, fatigue and unexpected weight change.   Eyes: Negative for visual disturbance.   Respiratory: Negative for cough, chest tightness and shortness of breath.    Cardiovascular: Negative for chest pain, palpitations and leg swelling.   Gastrointestinal: Negative for diarrhea, nausea and vomiting.   Endocrine: Negative for polydipsia, polyphagia and polyuria.   Skin: Negative for color change and rash.   Neurological: Negative for dizziness, syncope, weakness, light-headedness, numbness and headaches.       Objective     Vitals:    11/15/18 0925   BP: 124/68   Pulse: 105   Temp: 99.6 °F (37.6 °C)   SpO2: 98%       Physical Exam   Constitutional: She appears well-developed and well-nourished.   Neck: Neck supple. No JVD present. No thyromegaly present.   Cardiovascular: Normal rate, regular rhythm, normal heart sounds, intact distal pulses and normal pulses.   No murmur heard.  Pulmonary/Chest: Effort normal and breath sounds normal. No respiratory distress.   Abdominal: Soft. Bowel sounds are normal. There is no hepatosplenomegaly. There is no tenderness.   Musculoskeletal: She exhibits no edema.   Lymphadenopathy:     She has no cervical adenopathy.   Neurological: No sensory deficit.   Skin: Skin is warm and dry. She is not diaphoretic.   Nursing note and vitals  reviewed.      Assessment/Plan   Problem List Items Addressed This Visit        Cardiovascular and Mediastinum    Benign essential hypertension      Other Visit Diagnoses     Type 2 diabetes mellitus without complication, without long-term current use of insulin (CMS/[facility])    -  Primary    Relevant Medications    Empagliflozin 25 MG tablet    Other Relevant Orders    POC Glycosylated Hemoglobin (Hb A1C) (Completed)    POC Glucose (Completed)        Cont current antihypertensives.  Follow up in 4 weeks

## 2018-12-24 ENCOUNTER — OFFICE VISIT (OUTPATIENT)
Dept: FAMILY MEDICINE CLINIC | Facility: CLINIC | Age: 53
End: 2018-12-24

## 2018-12-24 VITALS
SYSTOLIC BLOOD PRESSURE: 138 MMHG | DIASTOLIC BLOOD PRESSURE: 62 MMHG | HEART RATE: 94 BPM | HEIGHT: 69 IN | WEIGHT: 174 LBS | OXYGEN SATURATION: 98 % | TEMPERATURE: 97.4 F | BODY MASS INDEX: 25.77 KG/M2

## 2018-12-24 DIAGNOSIS — E11.65 UNCONTROLLED TYPE 2 DIABETES MELLITUS WITH HYPERGLYCEMIA (HCC): Primary | ICD-10-CM

## 2018-12-24 LAB
GLUCOSE BLDC GLUCOMTR-MCNC: 240 MG/DL (ref 70–130)
HBA1C MFR BLD: 8.9 %

## 2018-12-24 PROCEDURE — 82962 GLUCOSE BLOOD TEST: CPT | Performed by: FAMILY MEDICINE

## 2018-12-24 PROCEDURE — 83036 HEMOGLOBIN GLYCOSYLATED A1C: CPT | Performed by: FAMILY MEDICINE

## 2018-12-24 PROCEDURE — 99213 OFFICE O/P EST LOW 20 MIN: CPT | Performed by: FAMILY MEDICINE

## 2018-12-24 RX ORDER — GABAPENTIN 800 MG/1
800 TABLET ORAL 4 TIMES DAILY
Refills: 0 | Status: ON HOLD | COMMUNITY
Start: 2018-12-17 | End: 2020-11-26 | Stop reason: SDUPTHER

## 2018-12-24 NOTE — PROGRESS NOTES
Subjective   Raquel Espitia is a 53 y.o. female    Chief Complaint  Diabetes mellitus      History of Present Illness   Patient presents today for one-month follow-up after initiation of Jardiance 25 mg daily to her diabetes medication regimen.  She is also on metformin 1000 mg twice a day and glimepiride 4 mg twice a day.  She reports that her blood sugars seem to be somewhat better on home testing.  Office testing today shows her A1c at 8.9% down from 10% one month ago.  Her random glucose is 240 down from 324 one month ago.  She is given samples today of Synjardy 12.5/1000 mg and is to take 1 tablet twice a day and not take her own personal supply of metformin.  We will try to get preauthorization for the Jardiance either in the combination form or alone.      The following portions of the patient's history were reviewed and updated as appropriate: allergies, current medications, past social history and problem list    Review of Systems   Constitutional: Negative for appetite change, diaphoresis, fatigue and unexpected weight change.   Eyes: Negative for visual disturbance.   Respiratory: Negative for cough, chest tightness and shortness of breath.    Cardiovascular: Negative for chest pain, palpitations and leg swelling.   Gastrointestinal: Negative for diarrhea, nausea and vomiting.   Endocrine: Negative for polydipsia, polyphagia and polyuria.   Skin: Negative for color change and rash.   Neurological: Negative for dizziness, syncope, weakness, light-headedness, numbness and headaches.       Objective     Vitals:    12/24/18 0945   BP: 138/62   Pulse: 94   Temp: 97.4 °F (36.3 °C)   SpO2: 98%       Physical Exam   Constitutional: She is oriented to person, place, and time. She appears well-developed and well-nourished.   Neck: Neck supple. No JVD present. No thyromegaly present.   Cardiovascular: Normal rate, regular rhythm, normal heart sounds, intact distal pulses and normal pulses.   No murmur  heard.  Pulmonary/Chest: Effort normal and breath sounds normal. No respiratory distress.   Abdominal: Soft. Bowel sounds are normal. There is no hepatosplenomegaly. There is no tenderness.   Musculoskeletal: She exhibits no edema.   Lymphadenopathy:     She has no cervical adenopathy.   Neurological: She is alert and oriented to person, place, and time. No sensory deficit.   Skin: Skin is warm and dry. She is not diaphoretic.   Psychiatric: She has a normal mood and affect. Her behavior is normal.   Nursing note and vitals reviewed.      Assessment/Plan   Problem List Items Addressed This Visit        Endocrine    DM (diabetes mellitus), type 2, uncontrolled (CMS/HCC) - Primary    Relevant Medications    Empagliflozin-Metformin HCl ER 12.5-1000 MG tablet sustained-release 24 hour    Other Relevant Orders    POC Glycosylated Hemoglobin (Hb A1C) (Completed)    POC Glucose (Completed)

## 2019-01-08 ENCOUNTER — APPOINTMENT (OUTPATIENT)
Dept: GENERAL RADIOLOGY | Facility: HOSPITAL | Age: 54
End: 2019-01-08

## 2019-01-08 ENCOUNTER — HOSPITAL ENCOUNTER (EMERGENCY)
Facility: HOSPITAL | Age: 54
Discharge: HOME | End: 2019-01-08
Attending: EMERGENCY MEDICINE | Admitting: EMERGENCY MEDICINE

## 2019-01-08 VITALS
DIASTOLIC BLOOD PRESSURE: 64 MMHG | OXYGEN SATURATION: 99 % | HEART RATE: 70 BPM | RESPIRATION RATE: 17 BRPM | SYSTOLIC BLOOD PRESSURE: 123 MMHG | BODY MASS INDEX: 25.17 KG/M2 | WEIGHT: 168 LBS | TEMPERATURE: 98.7 F

## 2019-01-08 DIAGNOSIS — R07.9 CHEST PAIN, UNSPECIFIED TYPE: Primary | ICD-10-CM

## 2019-01-08 LAB
ALBUMIN SERPL-MCNC: 4.5 G/DL (ref 3.5–5)
ALBUMIN/GLOB SERPL: 1.6 G/DL (ref 1–2)
ALP SERPL-CCNC: 109 U/L (ref 38–126)
ALT SERPL W P-5'-P-CCNC: 17 U/L (ref 13–69)
ANION GAP SERPL CALCULATED.3IONS-SCNC: 10.9 MMOL/L (ref 10–20)
AST SERPL-CCNC: 20 U/L (ref 15–46)
BASOPHILS # BLD AUTO: 0.06 10*3/MM3 (ref 0–0.2)
BASOPHILS NFR BLD AUTO: 0.8 % (ref 0–2.5)
BILIRUB SERPL-MCNC: 0.6 MG/DL (ref 0.2–1.3)
BUN BLD-MCNC: 13 MG/DL (ref 7–20)
BUN/CREAT SERPL: 26 (ref 7.1–23.5)
CALCIUM SPEC-SCNC: 9.1 MG/DL (ref 8.4–10.2)
CHLORIDE SERPL-SCNC: 108 MMOL/L (ref 98–107)
CO2 SERPL-SCNC: 27 MMOL/L (ref 26–30)
CREAT BLD-MCNC: 0.5 MG/DL (ref 0.6–1.3)
DEPRECATED RDW RBC AUTO: 42.8 FL (ref 37–54)
EOSINOPHIL # BLD AUTO: 0.07 10*3/MM3 (ref 0–0.7)
EOSINOPHIL NFR BLD AUTO: 0.9 % (ref 0–7)
ERYTHROCYTE [DISTWIDTH] IN BLOOD BY AUTOMATED COUNT: 13.9 % (ref 11.5–14.5)
GFR SERPL CREATININE-BSD FRML MDRD: 129 ML/MIN/1.73
GLOBULIN UR ELPH-MCNC: 2.8 GM/DL
GLUCOSE BLD-MCNC: 334 MG/DL (ref 74–98)
HCT VFR BLD AUTO: 47.8 % (ref 37–47)
HGB BLD-MCNC: 16.2 G/DL (ref 12–16)
HOLD SPECIMEN: NORMAL
IMM GRANULOCYTES # BLD AUTO: 0.05 10*3/MM3 (ref 0–0.06)
IMM GRANULOCYTES NFR BLD AUTO: 0.7 % (ref 0–0.6)
LYMPHOCYTES # BLD AUTO: 0.93 10*3/MM3 (ref 0.6–3.4)
LYMPHOCYTES NFR BLD AUTO: 12.3 % (ref 10–50)
MCH RBC QN AUTO: 29 PG (ref 27–31)
MCHC RBC AUTO-ENTMCNC: 33.9 G/DL (ref 30–37)
MCV RBC AUTO: 85.7 FL (ref 81–99)
MONOCYTES # BLD AUTO: 0.37 10*3/MM3 (ref 0–0.9)
MONOCYTES NFR BLD AUTO: 4.9 % (ref 0–12)
NEUTROPHILS # BLD AUTO: 6.06 10*3/MM3 (ref 2–6.9)
NEUTROPHILS NFR BLD AUTO: 80.4 % (ref 37–80)
NRBC BLD AUTO-RTO: 0 /100 WBC (ref 0–0)
PLATELET # BLD AUTO: 254 10*3/MM3 (ref 130–400)
PMV BLD AUTO: 11.5 FL (ref 6–12)
POTASSIUM BLD-SCNC: 3.9 MMOL/L (ref 3.5–5.1)
PROT SERPL-MCNC: 7.3 G/DL (ref 6.3–8.2)
RBC # BLD AUTO: 5.58 10*6/MM3 (ref 4.2–5.4)
SODIUM BLD-SCNC: 142 MMOL/L (ref 137–145)
TROPONIN I SERPL-MCNC: <0.012 NG/ML (ref 0–0.03)
TROPONIN I SERPL-MCNC: <0.012 NG/ML (ref 0–0.03)
WBC NRBC COR # BLD: 7.54 10*3/MM3 (ref 4.8–10.8)
WHOLE BLOOD HOLD SPECIMEN: NORMAL
WHOLE BLOOD HOLD SPECIMEN: NORMAL

## 2019-01-08 PROCEDURE — 99283 EMERGENCY DEPT VISIT LOW MDM: CPT

## 2019-01-08 PROCEDURE — 80053 COMPREHEN METABOLIC PANEL: CPT | Performed by: EMERGENCY MEDICINE

## 2019-01-08 PROCEDURE — 85025 COMPLETE CBC W/AUTO DIFF WBC: CPT | Performed by: EMERGENCY MEDICINE

## 2019-01-08 PROCEDURE — 84484 ASSAY OF TROPONIN QUANT: CPT | Performed by: EMERGENCY MEDICINE

## 2019-01-08 PROCEDURE — 71046 X-RAY EXAM CHEST 2 VIEWS: CPT

## 2019-01-08 PROCEDURE — 93005 ELECTROCARDIOGRAM TRACING: CPT | Performed by: EMERGENCY MEDICINE

## 2019-01-08 RX ORDER — SODIUM CHLORIDE 0.9 % (FLUSH) 0.9 %
10 SYRINGE (ML) INJECTION AS NEEDED
Status: DISCONTINUED | OUTPATIENT
Start: 2019-01-08 | End: 2019-01-08 | Stop reason: HOSPADM

## 2019-01-08 NOTE — ED PROVIDER NOTES
Subjective   History of Present Illness   53F w/ hx of HTN, DM, asthma, tobacco abuse p/w CP.  Patient describes 2 days of intermittent, seconds to minutes long, dull pain in her left chest that radiates across her chest and into both arms.  Nothing seemed to make better nor worse.  She had some associated lightheadedness with this today.  Denies other symptoms. Had some relief with aspirin yesterday.     Review of Systems   Cardiovascular: Positive for chest pain.   All other systems reviewed and are negative.      Past Medical History:   Diagnosis Date   • Acute bronchitis    • Asthma    • Diabetes mellitus (CMS/HCC)    • Edema    • GERD (gastroesophageal reflux disease)    • Hypertension    • Impaired fasting glucose    • Low back pain    • Pain of left calf        No Known Allergies    Past Surgical History:   Procedure Laterality Date   • BACK SURGERY     •  SECTION     • CHOLECYSTECTOMY     • HAND SURGERY     • NECK SURGERY         Family History   Problem Relation Age of Onset   • Hypertension Mother    • Stroke Mother    • Cancer Father        Social History     Socioeconomic History   • Marital status:      Spouse name: Not on file   • Number of children: Not on file   • Years of education: Not on file   • Highest education level: Not on file   Tobacco Use   • Smoking status: Current Every Day Smoker     Packs/day: 1.00     Types: Cigarettes   • Smokeless tobacco: Never Used   • Tobacco comment: 2017   Substance and Sexual Activity   • Alcohol use: Yes     Comment: rare   • Drug use: No   • Sexual activity: Defer           Objective   Physical Exam   Constitutional: She is oriented to person, place, and time. She appears well-developed and well-nourished.  Non-toxic appearance. She does not appear ill. No distress.   Smells of tobacco smoke   HENT:   Head: Normocephalic.   Mouth/Throat: Oropharynx is clear and moist. No oropharyngeal exudate.   Eyes: Conjunctivae are normal. No  scleral icterus.   Neck: Neck supple. No tracheal deviation present.   Cardiovascular: Normal rate, regular rhythm, normal heart sounds and intact distal pulses. Exam reveals no gallop and no friction rub.   No murmur heard.  Pulses:       Radial pulses are 2+ on the right side, and 2+ on the left side.        Dorsalis pedis pulses are 2+ on the right side, and 2+ on the left side.        Posterior tibial pulses are 2+ on the right side, and 2+ on the left side.   Pulmonary/Chest: Effort normal and breath sounds normal. No stridor. No respiratory distress. She has no wheezes. She has no rales. She exhibits no tenderness.   Abdominal: Soft. She exhibits no distension and no mass. There is no tenderness. There is no rebound and no guarding. No hernia.   Musculoskeletal: She exhibits no edema or deformity.   Neurological: She is alert and oriented to person, place, and time.   Skin: Skin is warm and dry. She is not diaphoretic. No erythema. No pallor.   Psychiatric: She has a normal mood and affect. Her behavior is normal.   Nursing note and vitals reviewed.      Procedures           ED Course  ED Course as of Jan 08 1641   Tue Jan 08, 2019   1630 EKG: Interpreted by me. NSR w/ nl intervals, no rosmery/std. Low voltage. Abnormal EKG    [AI]      ED Course User Index  [AI] Cullen Webb MD                  MDM  53F here w/ chest pain. AFVSS. In terms of patients chest pain, considered possible ACS, PE, dissection, pntx, tamponade, PNA, esophageal etiologies. No risk factors for PE. Low suspicion for pntx, pna, tamponade. HEART score = 3    4:41 PM Labs, CXR, EKG are all reassuring. Pt states she feels well and is eager to go home but is willing to stay for a repeat troponin. Will order for 545pm and if reassuring, will d/c home with information to call and f/u w/ cardiology for outpatient stress testing.     6:30 PM Repeat troponin unremarkable. Pt has remained stable throughout her stay. Discussed return to care  precautions.     Final diagnoses:   Chest pain, unspecified type            Cullen Webb MD  01/08/19 6389

## 2019-01-09 ENCOUNTER — TRANSCRIBE ORDERS (OUTPATIENT)
Dept: FAMILY MEDICINE CLINIC | Facility: CLINIC | Age: 54
End: 2019-01-09

## 2019-01-09 ENCOUNTER — TELEPHONE (OUTPATIENT)
Dept: FAMILY MEDICINE CLINIC | Facility: CLINIC | Age: 54
End: 2019-01-09

## 2019-01-09 DIAGNOSIS — R07.9 CHEST PAIN, UNSPECIFIED TYPE: Primary | ICD-10-CM

## 2019-01-09 NOTE — TELEPHONE ENCOUNTER
----- Message from Jahaira Amaya sent at 1/9/2019  2:49 PM EST -----  Patient went to the [facility] ER yesterday with chest pains. She had an EKG and it was normal. She is still having chest pains, and they suggest she see a cardiologist. I entered an order for Cardiology to see ..  Thanks,  Jahaira..

## 2019-02-14 NOTE — PROGRESS NOTES
OFFICE VISIT  NOTE  [facility] CARDIOLOGY      Name: Raquel Espitia    Date: 2/15/2019  MRN:  [id number]  :  1965      REFERRING/PRIMARY PROVIDER:  Vikram Stewart*    Chief Complaint   Patient presents with   • Chest Pain       HPI: Raquel Espitia is a 53 y.o. female who presents today for new consultation for chest pain.  She has associated history of diabetes mellitus type 2, hypertension, tobacco abuse, GERD.  She was seen in the [facility] ER approximately one month ago with chest pain gradient across her chest, she describes it as a dull ache, radiating to both arms, associated with shortness of breath.  Pain occurs daily, usually at rest, described as squeezing and tightness of side and radiates to both shoulders and through to her back.  She has associated numbness and tingling in both arms and fingers.  Unsure when her last stress test was.  Positive family history of premature CAD in her mother and father.  Diabetes is controlled with oral medications.  Blood pressure at times is labile, she is on verapamil.  She has never tried cholesterol medicines, no recent lipids and computer.    Past Medical History:   Diagnosis Date   • Acute bronchitis    • Asthma    • Diabetes mellitus (CMS/HCC)    • Edema    • GERD (gastroesophageal reflux disease)    • Hypertension    • Impaired fasting glucose    • Low back pain    • Pain of left calf        Past Surgical History:   Procedure Laterality Date   • BACK SURGERY     •  SECTION     • CHOLECYSTECTOMY     • HAND SURGERY     • NECK SURGERY         Social History     Socioeconomic History   • Marital status: Legally      Spouse name: Not on file   • Number of children: Not on file   • Years of education: Not on file   • Highest education level: Not on file   Social Needs   • Financial resource strain: Not on file   • Food insecurity - worry: Not on file   • Food insecurity - inability: Not on file   • Transportation  needs - medical: Not on file   • Transportation needs - non-medical: Not on file   Occupational History   • Not on file   Tobacco Use   • Smoking status: Current Every Day Smoker     Packs/day: 1.00     Types: Cigarettes   • Smokeless tobacco: Never Used   • Tobacco comment: July 14, 2017   Substance and Sexual Activity   • Alcohol use: No     Frequency: Never     Comment: rare   • Drug use: No   • Sexual activity: Defer   Other Topics Concern   • Not on file   Social History Narrative   • Not on file       Family History   Problem Relation Age of Onset   • Hypertension Mother    • Stroke Mother    • Cancer Father         ROS:   Constitutional no fever,  no weight loss   Skin no rash, no subcutaneous nodules   Otolaryngeal no difficulty swallowing   Cardiovascular See HPI   Pulmonary no cough, no sputum production   Gastrointestinal no constipation, no diarrhea   Genitourinary no dysuria, no hematuria   Hematologic no easy bruisability, no abnormal bleeding   Musculoskeletal no muscle pain   Neurologic no dizziness, no falls         No Known Allergies      Current Outpatient Medications:   •  aspirin 81 MG tablet, Take 81 mg by mouth Daily., Disp: , Rfl:   •  Empagliflozin-Metformin HCl ER 12.5-1000 MG tablet sustained-release 24 hour, Take 1 tablet by mouth 2 (Two) Times a Day., Disp: 60 tablet, Rfl: 5  •  gabapentin (NEURONTIN) 800 MG tablet, Take 800 mg by mouth 3 (Three) Times a Day., Disp: , Rfl: 0  •  glimepiride (AMARYL) 4 MG tablet, Take 1 tablet by mouth 2 (Two) Times a Day With Meals. (Patient taking differently: Take 4 mg by mouth Daily.), Disp: 60 tablet, Rfl: 11  •  HYDROcodone-acetaminophen (NORCO) 7.5-325 MG per tablet, take 1 tablet by mouth four times a day if needed, Disp: , Rfl: 0  •  nortriptyline (PAMELOR) 25 MG capsule, Take 1 capsule by mouth Every Night., Disp: 30 capsule, Rfl: 11  •  omeprazole (priLOSEC) 40 MG capsule, Take 1 capsule by mouth Daily., Disp: 30 capsule, Rfl: 11  •  raNITIdine  "(ZANTAC) 150 MG tablet, take 1 tablet by mouth every morning AND CONTINUE TAKING PRILOSEC...  (REFER TO PRESCRIPTION NOTES)., Disp: , Rfl: 0  •  tiZANidine (ZANAFLEX) 4 MG tablet, Take 4 mg by mouth 2 (Two) Times a Day., Disp: , Rfl: 0  •  verapamil SR (CALAN-SR) 180 MG CR tablet, Take 1 tablet by mouth Every Night., Disp: 30 tablet, Rfl: 11  •  amoxicillin-clavulanate (AUGMENTIN) 875-125 MG per tablet, Take 1 tablet by mouth 2 (Two) Times a Day for 10 days., Disp: 20 tablet, Rfl: 0  •  diazePAM (VALIUM) 10 MG tablet, take 1 tablet by mouth every 8 hours if needed for anxiety, Disp: 75 tablet, Rfl: 5  No current facility-administered medications for this visit.     Vitals:    02/15/19 0903   BP: 140/74   BP Location: Right arm   Patient Position: Sitting   Pulse: 91   Weight: 78.9 kg (174 lb)   Height: 157.5 cm (62\")     Body mass index is 31.83 kg/m².    PHYSICAL EXAM:    General Appearance:   · well developed  · well nourished  HENT:   · oropharynx moist  · lips not cyanotic  Neck:  · thyroid not enlarged  · supple  Respiratory:  · no respiratory distress  · normal breath sounds  · no rales  Cardiovascular:  · no jugular venous distention  · regular rhythm  · apical impulse normal  · S1 normal, S2 normal  · no S3, no S4   · no murmur  · no rub, no thrill  · carotid pulses normal; no bruit  · pedal pulses normal  · lower extremity edema: none    Gastrointestinal:   · bowel sounds normal  · non-tender  · no hepatomegaly, no splenomegaly  Musculoskeletal:  · no clubbing of fingers.   · normocephalic, head atraumatic  Skin:   · warm, dry  Psychiatric:  · judgement and insight appropriate  · normal mood and affect    RESULTS:     ECG 12 Lead  Date/Time: 2/15/2019 9:25 AM  Performed by: Vikram Aguilar MD  Authorized by: Vikram Aguilar MD   Rhythm: sinus rhythm  Rate: normal  BPM: 91  QRS axis: normal    Clinical impression: normal ECG                   Labs:  Lab Results   Component Value Date    CHOL 220 (H) " 03/13/2019    TRIG 303 (H) 03/13/2019    HDL 40 03/13/2019     (H) 03/13/2019    AST 20 01/08/2019    ALT 17 01/08/2019     Lab Results   Component Value Date    HGBA1C 7.7 03/04/2019     No components found for: CREATINININE  eGFR Non  Amer   Date Value Ref Range Status   01/08/2019 129 >60 mL/min/1.73 Final   10/18/2016 75 >60 mL/min/1.73 Final         ASSESSMENT:  Problem List Items Addressed This Visit        Cardiovascular and Mediastinum    Benign essential hypertension       Endocrine    DM (diabetes mellitus), type 2, uncontrolled (CMS/HCC)       Other    Tobacco abuse      Other Visit Diagnoses     Precordial chest pain    -  Primary    Relevant Orders    Adult Transthoracic Echo Complete W/ Cont if Necessary Per Protocol (Completed)    Stress Test With Myocardial Perfusion (Completed)    Lipid Panel (Completed)    ECG 12 Lead (Completed)          PLAN:    1.  Chest pain:  Given her extensive risk factors of diabetes, hypertension, tobacco abuse, family history we'll proceed with exercise nuclear stress test and echocardiogram  The patient was instructed to call 911 if their chest pain worsens or is not relieved with rest or nitroglycerin.    2.  Tobacco abuse:  We discussed importance of complete tobacco cessation, assistance was offered, she is not ready at this time.  She tried Chantix unsuccessfully in the past.    3.  Diabetes mellitus type 2:  Check lipid profile  Goal A1c less than 7  Continue SGLT 2 inhibitor    4.  Hypertension:  Long-term goal blood pressure less than 130/80  Continue verapamil for now    Return to clinic in 3 months      Thank you for the opportunity to share in the care of your patient; please do not hesitate to call me with any questions.     Vikram Aguilar MD, [facility]  Office: [phone] [address] [facility]  [address] [place] [address]

## 2019-02-15 ENCOUNTER — CONSULT (OUTPATIENT)
Dept: CARDIOLOGY | Facility: CLINIC | Age: 54
End: 2019-02-15

## 2019-02-15 VITALS
HEART RATE: 91 BPM | BODY MASS INDEX: 32.02 KG/M2 | SYSTOLIC BLOOD PRESSURE: 140 MMHG | DIASTOLIC BLOOD PRESSURE: 74 MMHG | HEIGHT: 62 IN | WEIGHT: 174 LBS

## 2019-02-15 DIAGNOSIS — E11.65 UNCONTROLLED TYPE 2 DIABETES MELLITUS WITH HYPERGLYCEMIA (HCC): ICD-10-CM

## 2019-02-15 DIAGNOSIS — I10 BENIGN ESSENTIAL HYPERTENSION: ICD-10-CM

## 2019-02-15 DIAGNOSIS — Z72.0 TOBACCO ABUSE: ICD-10-CM

## 2019-02-15 DIAGNOSIS — R07.2 PRECORDIAL CHEST PAIN: Primary | ICD-10-CM

## 2019-02-15 PROCEDURE — 99204 OFFICE O/P NEW MOD 45 MIN: CPT | Performed by: INTERNAL MEDICINE

## 2019-02-15 PROCEDURE — 93000 ELECTROCARDIOGRAM COMPLETE: CPT | Performed by: INTERNAL MEDICINE

## 2019-03-04 ENCOUNTER — OFFICE VISIT (OUTPATIENT)
Dept: FAMILY MEDICINE CLINIC | Facility: CLINIC | Age: 54
End: 2019-03-04

## 2019-03-04 VITALS
OXYGEN SATURATION: 97 % | HEART RATE: 80 BPM | DIASTOLIC BLOOD PRESSURE: 82 MMHG | SYSTOLIC BLOOD PRESSURE: 128 MMHG | RESPIRATION RATE: 18 BRPM | WEIGHT: 172.4 LBS | BODY MASS INDEX: 31.53 KG/M2

## 2019-03-04 DIAGNOSIS — J40 BRONCHITIS: ICD-10-CM

## 2019-03-04 DIAGNOSIS — E11.65 UNCONTROLLED TYPE 2 DIABETES MELLITUS WITH HYPERGLYCEMIA (HCC): Primary | ICD-10-CM

## 2019-03-04 LAB
EXPIRATION DATE: NORMAL
HBA1C MFR BLD: 7.7 %
Lab: NORMAL

## 2019-03-04 PROCEDURE — 83036 HEMOGLOBIN GLYCOSYLATED A1C: CPT | Performed by: FAMILY MEDICINE

## 2019-03-04 PROCEDURE — 99213 OFFICE O/P EST LOW 20 MIN: CPT | Performed by: FAMILY MEDICINE

## 2019-03-04 RX ORDER — AMOXICILLIN AND CLAVULANATE POTASSIUM 875; 125 MG/1; MG/1
1 TABLET, FILM COATED ORAL 2 TIMES DAILY
Qty: 20 TABLET | Refills: 0 | Status: SHIPPED | OUTPATIENT
Start: 2019-03-04 | End: 2019-03-14
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
## 2019-03-07 DIAGNOSIS — F41.9 ANXIETY: ICD-10-CM

## 2019-03-08 RX ORDER — DIAZEPAM 10 MG/1
TABLET ORAL
Qty: 75 TABLET | Refills: 5 | Status: SHIPPED | OUTPATIENT
Start: 2019-03-08 | End: 2019-11-22 | Stop reason: SDUPTHER

## 2019-03-13 ENCOUNTER — HOSPITAL ENCOUNTER (OUTPATIENT)
Dept: CARDIOLOGY | Facility: HOSPITAL | Age: 54
Discharge: HOME | End: 2019-03-13

## 2019-03-13 ENCOUNTER — LAB (OUTPATIENT)
Dept: LAB | Facility: HOSPITAL | Age: 54
End: 2019-03-13

## 2019-03-13 VITALS
BODY MASS INDEX: 31.73 KG/M2 | DIASTOLIC BLOOD PRESSURE: 80 MMHG | SYSTOLIC BLOOD PRESSURE: 130 MMHG | HEART RATE: 89 BPM | HEIGHT: 62 IN | WEIGHT: 172.4 LBS

## 2019-03-13 DIAGNOSIS — R07.2 PRECORDIAL CHEST PAIN: ICD-10-CM

## 2019-03-13 LAB
ARTICHOKE IGE QN: 141 MG/DL (ref 0–130)
BH CV ECHO MEAS - AO ROOT AREA (BSA CORRECTED): 1.7
BH CV ECHO MEAS - AO ROOT AREA: 7.1 CM^2
BH CV ECHO MEAS - AO ROOT DIAM: 3 CM
BH CV ECHO MEAS - BSA(HAYCOCK): 1.9 M^2
BH CV ECHO MEAS - BSA: 1.8 M^2
BH CV ECHO MEAS - BZI_BMI: 31.5 KILOGRAMS/M^2
BH CV ECHO MEAS - BZI_METRIC_HEIGHT: 157.5 CM
BH CV ECHO MEAS - BZI_METRIC_WEIGHT: 78 KG
BH CV ECHO MEAS - EDV(CUBED): 60 ML
BH CV ECHO MEAS - EDV(MOD-SP2): 34 ML
BH CV ECHO MEAS - EDV(MOD-SP4): 44 ML
BH CV ECHO MEAS - EDV(TEICH): 66.5 ML
BH CV ECHO MEAS - EF(CUBED): 74.9 %
BH CV ECHO MEAS - EF(MOD-SP2): 50 %
BH CV ECHO MEAS - EF(MOD-SP4): 61.4 %
BH CV ECHO MEAS - EF(TEICH): 67.5 %
BH CV ECHO MEAS - ESV(CUBED): 15 ML
BH CV ECHO MEAS - ESV(MOD-SP2): 17 ML
BH CV ECHO MEAS - ESV(MOD-SP4): 17 ML
BH CV ECHO MEAS - ESV(TEICH): 21.6 ML
BH CV ECHO MEAS - FS: 37 %
BH CV ECHO MEAS - IVS/LVPW: 1.2
BH CV ECHO MEAS - IVSD: 1 CM
BH CV ECHO MEAS - LA DIMENSION: 2.8 CM
BH CV ECHO MEAS - LA/AO: 0.94
BH CV ECHO MEAS - LAD MAJOR: 4.5 CM
BH CV ECHO MEAS - LAT PEAK E' VEL: 6.4 CM/SEC
BH CV ECHO MEAS - LATERAL E/E' RATIO: 14
BH CV ECHO MEAS - LV DIASTOLIC VOL/BSA (35-75): 24.5 ML/M^2
BH CV ECHO MEAS - LV MASS(C)D: 110.4 GRAMS
BH CV ECHO MEAS - LV MASS(C)DI: 61.6 GRAMS/M^2
BH CV ECHO MEAS - LV SYSTOLIC VOL/BSA (12-30): 9.5 ML/M^2
BH CV ECHO MEAS - LVIDD: 3.9 CM
BH CV ECHO MEAS - LVIDS: 2.5 CM
BH CV ECHO MEAS - LVLD AP2: 5.3 CM
BH CV ECHO MEAS - LVLD AP4: 6.9 CM
BH CV ECHO MEAS - LVLS AP2: 4.9 CM
BH CV ECHO MEAS - LVLS AP4: 5.2 CM
BH CV ECHO MEAS - LVPWD: 0.86 CM
BH CV ECHO MEAS - MED PEAK E' VEL: 6.4 CM/SEC
BH CV ECHO MEAS - MEDIAL E/E' RATIO: 14
BH CV ECHO MEAS - MV A MAX VEL: 86.4 CM/SEC
BH CV ECHO MEAS - MV E MAX VEL: 90.8 CM/SEC
BH CV ECHO MEAS - MV E/A: 1.1
BH CV ECHO MEAS - PA ACC SLOPE: 587 CM/SEC^2
BH CV ECHO MEAS - PA ACC TIME: 0.11 SEC
BH CV ECHO MEAS - PA PR(ACCEL): 28.3 MMHG
BH CV ECHO MEAS - RAP SYSTOLE: 3 MMHG
BH CV ECHO MEAS - RVSP: 26 MMHG
BH CV ECHO MEAS - SI(CUBED): 25.1 ML/M^2
BH CV ECHO MEAS - SI(MOD-SP2): 9.5 ML/M^2
BH CV ECHO MEAS - SI(MOD-SP4): 15.1 ML/M^2
BH CV ECHO MEAS - SI(TEICH): 25 ML/M^2
BH CV ECHO MEAS - SV(CUBED): 44.9 ML
BH CV ECHO MEAS - SV(MOD-SP2): 17 ML
BH CV ECHO MEAS - SV(MOD-SP4): 27 ML
BH CV ECHO MEAS - SV(TEICH): 44.9 ML
BH CV ECHO MEAS - TAPSE (>1.6): 2.2 CM2
BH CV ECHO MEAS - TR MAX PG: 23 MMHG
BH CV ECHO MEAS - TR MAX VEL: 236.8 CM/SEC
BH CV ECHO MEASUREMENTS AVERAGE E/E' RATIO: 14.19
BH CV STRESS BP STAGE 1: NORMAL
BH CV STRESS BP STAGE 3: NORMAL
BH CV STRESS COMMENTS STAGE 1: NORMAL
BH CV STRESS DOSE REGADENOSON STAGE 1: 0.4
BH CV STRESS DURATION MIN STAGE 1: 1
BH CV STRESS DURATION MIN STAGE 2: 1
BH CV STRESS DURATION MIN STAGE 3: 1
BH CV STRESS DURATION MIN STAGE 4: 1
BH CV STRESS DURATION SEC STAGE 2: 0
BH CV STRESS GRADE STAGE 1: 10
BH CV STRESS HR STAGE 1: 92
BH CV STRESS HR STAGE 2: 96
BH CV STRESS HR STAGE 3: 86
BH CV STRESS HR STAGE 4: 93
BH CV STRESS METS STAGE 1: 5
BH CV STRESS PROTOCOL 1: NORMAL
BH CV STRESS RECOVERY BP: NORMAL MMHG
BH CV STRESS RECOVERY HR: 87 BPM
BH CV STRESS SPEED STAGE 1: 1.7
BH CV STRESS STAGE 1: 1
BH CV STRESS STAGE 2: 2
BH CV STRESS STAGE 3: 3
BH CV STRESS STAGE 4: 4
BH CV VAS BP RIGHT ARM: NORMAL MMHG
BH CV XLRA - RV BASE: 2.9 CM
BH CV XLRA - RV LENGTH: 5.6 CM
BH CV XLRA - RV MID: 1.9 CM
BH CV XLRA - TDI S': 12.6 CM/SEC
CHOLEST SERPL-MCNC: 220 MG/DL (ref 0–200)
HDLC SERPL-MCNC: 40 MG/DL (ref 40–60)
LV EF NUC BP: 71 %
MAXIMAL PREDICTED HEART RATE: 166 BPM
MAXIMAL PREDICTED HEART RATE: 166 BPM
PERCENT MAX PREDICTED HR: 63.86 %
STRESS BASELINE BP: NORMAL MMHG
STRESS BASELINE HR: 87 BPM
STRESS PERCENT HR: 75 %
STRESS POST PEAK BP: NORMAL MMHG
STRESS POST PEAK HR: 106 BPM
STRESS TARGET HR: 141 BPM
STRESS TARGET HR: 141 BPM
TRIGL SERPL-MCNC: 303 MG/DL (ref 0–150)

## 2019-03-13 PROCEDURE — 25010000002 REGADENOSON 0.4 MG/5ML SOLUTION: Performed by: INTERNAL MEDICINE

## 2019-03-13 PROCEDURE — 93306 TTE W/DOPPLER COMPLETE: CPT | Performed by: INTERNAL MEDICINE

## 2019-03-13 PROCEDURE — 80061 LIPID PANEL: CPT

## 2019-03-13 PROCEDURE — 93018 CV STRESS TEST I&R ONLY: CPT | Performed by: INTERNAL MEDICINE

## 2019-03-13 PROCEDURE — 78452 HT MUSCLE IMAGE SPECT MULT: CPT | Performed by: INTERNAL MEDICINE

## 2019-03-13 PROCEDURE — A9500 TC99M SESTAMIBI: HCPCS | Performed by: INTERNAL MEDICINE

## 2019-03-13 PROCEDURE — 0 TECHNETIUM SESTAMIBI: Performed by: INTERNAL MEDICINE

## 2019-03-13 PROCEDURE — 78452 HT MUSCLE IMAGE SPECT MULT: CPT

## 2019-03-13 PROCEDURE — 36415 COLL VENOUS BLD VENIPUNCTURE: CPT

## 2019-03-13 PROCEDURE — 93017 CV STRESS TEST TRACING ONLY: CPT

## 2019-03-13 PROCEDURE — 93306 TTE W/DOPPLER COMPLETE: CPT

## 2019-03-13 RX ADMIN — TECHNETIUM TC 99M SESTAMIBI 1 DOSE: 1 INJECTION INTRAVENOUS at 12:05

## 2019-03-13 RX ADMIN — REGADENOSON 0.4 MG: 0.08 INJECTION, SOLUTION INTRAVENOUS at 12:06

## 2019-03-13 RX ADMIN — TECHNETIUM TC 99M SESTAMIBI 1 DOSE: 1 INJECTION INTRAVENOUS at 10:20

## 2019-03-14 ENCOUNTER — TELEPHONE (OUTPATIENT)
Dept: CARDIOLOGY | Facility: CLINIC | Age: 54
End: 2019-03-14

## 2019-03-14 DIAGNOSIS — E78.5 HYPERLIPIDEMIA, UNSPECIFIED HYPERLIPIDEMIA TYPE: Primary | ICD-10-CM

## 2019-03-14 RX ORDER — ATORVASTATIN CALCIUM 40 MG/1
40 TABLET, FILM COATED ORAL NIGHTLY
Qty: 90 TABLET | Refills: 3 | Status: SHIPPED | OUTPATIENT
Start: 2019-03-14 | End: 2020-11-12 | Stop reason: SDUPTHER

## 2019-03-14 NOTE — TELEPHONE ENCOUNTER
Called pt to let her know about her lipid panel and new medication sent in. Advised her to get a repeat lipid done at her next appointment. Orders placed. Also informed her of her normal stress and echo. Pt agreeable to plan.

## 2019-05-15 NOTE — PROGRESS NOTES
OFFICE VISIT  NOTE  [facility] CARDIOLOGY      Name: Raquel Espitia    Date: 2019  MRN:  [id number]  :  1965      REFERRING/PRIMARY PROVIDER:  No ref. provider found    Chief Complaint   Patient presents with   • Hyperlipidemia       HPI: Rqauel Espitia is a 54 y.o. female who presents today for follow-up of chest pain.  She has associated history of diabetes mellitus type 2, hypertension, tobacco abuse, GERD.  Stress test and echocardiogram results were benign in 2019 after her last visit.  Patient reports continued chest pain occasional chest pressure, occurs 1-2 times per week, usually last for about 1 hour off and on.  Pain described as substernal, nonradiating, pressure-like somebody placing her hand on her chest, mild in intensity.  She also gets occasional tingling sensation like pinpricks across her chest, but these last for 6 to 12 hours at times.  She is still smoking 1 pack/day but is interested in Chantix to help quit.  She denies palpitations, dizziness, lightheadedness or syncope.  She smokes more on days when she is stressed.  She has smoked for at least 30 years.      Past Medical History:   Diagnosis Date   • Acute bronchitis    • Asthma    • Diabetes mellitus (CMS/HCC)    • Edema    • GERD (gastroesophageal reflux disease)    • Hypertension    • Impaired fasting glucose    • Low back pain    • Pain of left calf        Past Surgical History:   Procedure Laterality Date   • BACK SURGERY     •  SECTION     • CHOLECYSTECTOMY     • HAND SURGERY     • NECK SURGERY         Social History     Socioeconomic History   • Marital status: Legally      Spouse name: Not on file   • Number of children: Not on file   • Years of education: Not on file   • Highest education level: Not on file   Tobacco Use   • Smoking status: Current Every Day Smoker     Packs/day: 1.00     Types: Cigarettes   • Smokeless tobacco: Never Used   • Tobacco comment:   2017   Substance and Sexual Activity   • Alcohol use: No     Frequency: Never     Comment: rare   • Drug use: No   • Sexual activity: Defer       Family History   Problem Relation Age of Onset   • Hypertension Mother    • Stroke Mother    • Cancer Father         ROS:   Constitutional no fever,  no weight loss   Skin no rash, no subcutaneous nodules   Otolaryngeal no difficulty swallowing   Cardiovascular See HPI   Pulmonary no cough, no sputum production   Gastrointestinal no constipation, no diarrhea   Genitourinary no dysuria, no hematuria   Hematologic no easy bruisability, no abnormal bleeding   Musculoskeletal no muscle pain   Neurologic no dizziness, no falls         No Known Allergies      Current Outpatient Medications:   •  aspirin 81 MG tablet, Take 81 mg by mouth Daily., Disp: , Rfl:   •  atorvastatin (LIPITOR) 40 MG tablet, Take 1 tablet by mouth Every Night., Disp: 90 tablet, Rfl: 3  •  diazePAM (VALIUM) 10 MG tablet, take 1 tablet by mouth every 8 hours if needed for anxiety, Disp: 75 tablet, Rfl: 5  •  Empagliflozin-Metformin HCl ER 12.5-1000 MG tablet sustained-release 24 hour, Take 1 tablet by mouth 2 (Two) Times a Day., Disp: 60 tablet, Rfl: 5  •  gabapentin (NEURONTIN) 800 MG tablet, Take 800 mg by mouth 3 (Three) Times a Day., Disp: , Rfl: 0  •  glimepiride (AMARYL) 4 MG tablet, Take 1 tablet by mouth 2 (Two) Times a Day With Meals. (Patient taking differently: Take 4 mg by mouth Daily.), Disp: 60 tablet, Rfl: 11  •  HYDROcodone-acetaminophen (NORCO) 7.5-325 MG per tablet, take 1 tablet by mouth four times a day if needed, Disp: , Rfl: 0  •  nortriptyline (PAMELOR) 25 MG capsule, Take 1 capsule by mouth Every Night., Disp: 30 capsule, Rfl: 11  •  omeprazole (priLOSEC) 40 MG capsule, Take 1 capsule by mouth Daily., Disp: 30 capsule, Rfl: 11  •  raNITIdine (ZANTAC) 150 MG tablet, take 1 tablet by mouth every morning AND CONTINUE TAKING PRILOSEC...  (REFER TO PRESCRIPTION NOTES)., Disp: , Rfl: 0  •   "tiZANidine (ZANAFLEX) 4 MG tablet, Take 4 mg by mouth 2 (Two) Times a Day., Disp: , Rfl: 0  •  verapamil SR (CALAN-SR) 180 MG CR tablet, Take 1 tablet by mouth Every Night., Disp: 30 tablet, Rfl: 11  •  metoprolol tartrate (LOPRESSOR) 25 MG tablet, Take 0.5 tablets by mouth 2 (Two) Times a Day., Disp: 30 tablet, Rfl: 11  •  varenicline (CHANTIX CONTINUING MONTH SHANDA) 1 MG tablet, Take 1 tablet by mouth 2 (Two) Times a Day., Disp: 60 tablet, Rfl: 11  •  varenicline (CHANTIX STARTING MONTH SHANDA) 0.5 MG X 11 & 1 MG X 42 tablet, Take 0.5 mg one daily on days 1-3 and and 0.5 mg twice daily on days 4-7.Then 1 mg twice daily for a total of 12 weeks., Disp: 42 tablet, Rfl: 0    Vitals:    05/17/19 0901   BP: 110/72   BP Location: Left arm   Patient Position: Sitting   Pulse: 90   Weight: 76.5 kg (168 lb 9.6 oz)   Height: 157.5 cm (62\")     Body mass index is 30.84 kg/m².    PHYSICAL EXAM:    General Appearance:   · well developed  · well nourished  HENT:   · oropharynx moist  · lips not cyanotic  Neck:  · thyroid not enlarged  · supple  Respiratory:  · no respiratory distress  · normal breath sounds  · no rales  Cardiovascular:  · no jugular venous distention  · regular rhythm  · apical impulse normal  · S1 normal, S2 normal  · no S3, no S4   · no murmur  · no rub, no thrill  · carotid pulses normal; no bruit  · pedal pulses normal  · lower extremity edema: none    Gastrointestinal:   · bowel sounds normal  · non-tender  · no hepatomegaly, no splenomegaly  Musculoskeletal:  · no clubbing of fingers.   · normocephalic, head atraumatic  Skin:   · warm, dry  Psychiatric:  · judgement and insight appropriate  · normal mood and affect    RESULTS:   Procedures    Results for orders placed during the hospital encounter of 03/13/19   Adult Transthoracic Echo Complete W/ Cont if Necessary Per Protocol    Narrative · Left ventricular systolic function is normal.  · Estimated EF appears to be in the range of 56 - 60%  · Left ventricular " diastolic dysfunction.  · No significant valvular disease.            Labs:  Lab Results   Component Value Date    CHOL 220 (H) 03/13/2019    TRIG 303 (H) 03/13/2019    HDL 40 03/13/2019     (H) 03/13/2019    AST 20 01/08/2019    ALT 17 01/08/2019     Lab Results   Component Value Date    HGBA1C 7.7 03/04/2019     No components found for: CREATINININE  eGFR Non  Amer   Date Value Ref Range Status   01/08/2019 129 >60 mL/min/1.73 Final   10/18/2016 75 >60 mL/min/1.73 Final         ASSESSMENT:  Problem List Items Addressed This Visit        Cardiovascular and Mediastinum    Benign essential hypertension    Relevant Medications    metoprolol tartrate (LOPRESSOR) 25 MG tablet       Endocrine    DM (diabetes mellitus), type 2, uncontrolled (CMS/HCC)       Other    Tobacco abuse      Other Visit Diagnoses     Precordial chest pain    -  Primary          PLAN:    1.  Chest pain:  Stress test findings were low risk  Echocardiogram showed normal ejection fraction and no significant valvular disease.  However due to her ongoing chest pain, will start metoprolol 12.5 mg twice daily  Advised patient call in 2 to 3 weeks, if chest pain is not improved, will discuss possible cardiac catheterization versus long-acting nitrates.  The patient was advised to call 911 if chest pain worsens or persist despite nitroglycerin or rest.    2.  Tobacco abuse:  The patient smokes 1 ppd and has done so for the past 30 years. I discussed the importance of tobacco cessation with the patient; specifically the reduced risk of cardiovascular disease if they stop. Free literature was provided to the patient. At this time the patient is willing to quit. We discussed the various options for smoking cessation, the importance of setting a quit date, and risks/benefits/alternatives of medications that may help. The patient decided to try Chantix, which will be prescribed today. We will discuss their efforts at a scheduled follow up visit. 5  minutes was spent discussing smoking cessation.     3.  Diabetes mellitus type 2:  Check lipid profile  Goal A1c less than 7  Continue SGLT 2 inhibitor    4.  Hypertension:  Long-term goal blood pressure less than 130/80  Continue verapamil for now    Return to clinic in 3 months      Thank you for the opportunity to share in the care of your patient; please do not hesitate to call me with any questions.     Vikram Aguilar MD, [facility]  Office: [phone] [address] [place] [address]

## 2019-05-17 ENCOUNTER — OFFICE VISIT (OUTPATIENT)
Dept: CARDIOLOGY | Facility: CLINIC | Age: 54
End: 2019-05-17

## 2019-05-17 VITALS
BODY MASS INDEX: 31.03 KG/M2 | DIASTOLIC BLOOD PRESSURE: 72 MMHG | SYSTOLIC BLOOD PRESSURE: 110 MMHG | WEIGHT: 168.6 LBS | HEIGHT: 62 IN | HEART RATE: 90 BPM

## 2019-05-17 DIAGNOSIS — R07.2 PRECORDIAL CHEST PAIN: Primary | ICD-10-CM

## 2019-05-17 DIAGNOSIS — I10 BENIGN ESSENTIAL HYPERTENSION: ICD-10-CM

## 2019-05-17 DIAGNOSIS — E11.65 UNCONTROLLED TYPE 2 DIABETES MELLITUS WITH HYPERGLYCEMIA (HCC): ICD-10-CM

## 2019-05-17 DIAGNOSIS — Z72.0 TOBACCO ABUSE: ICD-10-CM

## 2019-05-17 PROCEDURE — 99406 BEHAV CHNG SMOKING 3-10 MIN: CPT | Performed by: INTERNAL MEDICINE

## 2019-05-17 PROCEDURE — 99214 OFFICE O/P EST MOD 30 MIN: CPT | Performed by: INTERNAL MEDICINE

## 2019-05-17 RX ORDER — VARENICLINE TARTRATE 1 MG/1
1 TABLET, FILM COATED ORAL 2 TIMES DAILY
Qty: 60 TABLET | Refills: 11 | Status: SHIPPED | OUTPATIENT
Start: 2019-05-17 | End: 2019-12-06 | Stop reason: SDUPTHER

## 2019-06-05 ENCOUNTER — OFFICE VISIT (OUTPATIENT)
Dept: FAMILY MEDICINE CLINIC | Facility: CLINIC | Age: 54
End: 2019-06-05

## 2019-06-05 VITALS
TEMPERATURE: 97.2 F | HEART RATE: 97 BPM | BODY MASS INDEX: 31.1 KG/M2 | DIASTOLIC BLOOD PRESSURE: 78 MMHG | HEIGHT: 62 IN | OXYGEN SATURATION: 98 % | WEIGHT: 169 LBS | SYSTOLIC BLOOD PRESSURE: 140 MMHG

## 2019-06-05 DIAGNOSIS — E11.9 TYPE 2 DIABETES MELLITUS WITHOUT COMPLICATION, WITHOUT LONG-TERM CURRENT USE OF INSULIN (HCC): Primary | ICD-10-CM

## 2019-06-05 DIAGNOSIS — I10 BENIGN ESSENTIAL HYPERTENSION: ICD-10-CM

## 2019-06-05 DIAGNOSIS — J40 BRONCHITIS: ICD-10-CM

## 2019-06-05 DIAGNOSIS — Z72.0 TOBACCO ABUSE: ICD-10-CM

## 2019-06-05 DIAGNOSIS — IMO0001 UNCONTROLLED TYPE 2 DIABETES MELLITUS WITHOUT COMPLICATION, WITHOUT LONG-TERM CURRENT USE OF INSULIN: ICD-10-CM

## 2019-06-05 LAB
GLUCOSE BLDC GLUCOMTR-MCNC: 184 MG/DL (ref 70–130)
HBA1C MFR BLD: 8.5 %

## 2019-06-05 PROCEDURE — 99214 OFFICE O/P EST MOD 30 MIN: CPT | Performed by: FAMILY MEDICINE

## 2019-06-05 PROCEDURE — 83036 HEMOGLOBIN GLYCOSYLATED A1C: CPT | Performed by: FAMILY MEDICINE

## 2019-06-05 PROCEDURE — 82962 GLUCOSE BLOOD TEST: CPT | Performed by: FAMILY MEDICINE

## 2019-06-05 RX ORDER — GLIMEPIRIDE 4 MG/1
4 TABLET ORAL 2 TIMES DAILY WITH MEALS
Qty: 60 TABLET | Refills: 11 | Status: SHIPPED | OUTPATIENT
Start: 2019-06-05 | End: 2021-01-01

## 2019-06-05 RX ORDER — AMOXICILLIN AND CLAVULANATE POTASSIUM 875; 125 MG/1; MG/1
1 TABLET, FILM COATED ORAL 2 TIMES DAILY
Qty: 20 TABLET | Refills: 0 | Status: SHIPPED | OUTPATIENT
Start: 2019-06-05 | End: 2019-06-15

## 2019-06-05 RX ORDER — OMEPRAZOLE 40 MG/1
40 CAPSULE, DELAYED RELEASE ORAL DAILY
Qty: 30 CAPSULE | Refills: 11 | Status: SHIPPED | OUTPATIENT
Start: 2019-06-05 | End: 2020-07-07 | Stop reason: SDUPTHER

## 2019-06-05 NOTE — PROGRESS NOTES
Subjective   Raquel Espitia is a 54 y.o. female    Chief Complaint    Diabetes mellitus  Hypertension  GERD  Productive cough    History of Present Illness  Patient presents today to follow-up regarding type 2 diabetes mellitus.  Hemoglobin A1c today is 8.5% up from previous visit of 7.7%.  Patient admits to dietary noncompliance.  She also reports that she has missed several doses of her medication.  She is advised that at her current levels she will be requiring insulin soon if significant improvement does not occur.  She tells me she is now on Chantix at the direction of her cardiologist.  She is also on Lipitor 40 mg daily.  She reports that he is considering doing a left heart catheterization because of her symptoms and multiple risk factors.  Patient is complaining today of head and chest congestion, postnasal drainage and productive cough with yellow sputum.  No documented fever or chills.  Pulse oximetry today is 98% on room air.    The following portions of the patient's history were reviewed and updated as appropriate: allergies, current medications, past social history and problem list    Review of Systems   Constitutional: Negative for appetite change, chills, diaphoresis, fatigue, fever and unexpected weight change.   Eyes: Negative for visual disturbance.   Respiratory: Negative for cough, chest tightness, shortness of breath and wheezing.    Cardiovascular: Negative for chest pain, palpitations and leg swelling.   Gastrointestinal: Negative for abdominal pain, diarrhea, nausea and vomiting.   Endocrine: Negative for polydipsia, polyphagia and polyuria.   Genitourinary: Negative for dysuria, frequency and urgency.   Musculoskeletal: Negative for arthralgias, back pain and myalgias.   Skin: Negative for color change and rash.   Neurological: Negative for dizziness, tremors, syncope, weakness, light-headedness, numbness and headaches.   Hematological: Negative for adenopathy. Does not bruise/bleed easily.    Psychiatric/Behavioral: Positive for dysphoric mood and sleep disturbance. Negative for agitation, behavioral problems, confusion, decreased concentration and suicidal ideas. The patient is nervous/anxious.        Objective     Vitals:    06/05/19 1020   BP: 140/78   Pulse: 97   Temp: 97.2 °F (36.2 °C)   SpO2: 98%       Physical Exam   Constitutional: She is oriented to person, place, and time. She appears well-developed and well-nourished. No distress.   HENT:   Head: Normocephalic and atraumatic.   Nose: Nose normal.   Mouth/Throat: Oropharynx is clear and moist.   Eyes: Conjunctivae are normal. Pupils are equal, round, and reactive to light.   Neck: Neck supple. No JVD present. No thyromegaly present.   Cardiovascular: Normal rate, regular rhythm, normal heart sounds, intact distal pulses and normal pulses.   No murmur heard.  Pulmonary/Chest: Effort normal and breath sounds normal. No stridor. No respiratory distress.   Abdominal: Soft. Bowel sounds are normal. There is no hepatosplenomegaly. There is no tenderness.   Musculoskeletal: She exhibits no edema.   Lymphadenopathy:     She has no cervical adenopathy.   Neurological: She is alert and oriented to person, place, and time. No sensory deficit.   Skin: Skin is warm and dry. No rash noted. She is not diaphoretic.   Psychiatric: She has a normal mood and affect. Her behavior is normal.   Nursing note and vitals reviewed.      Assessment/Plan   Problem List Items Addressed This Visit        Cardiovascular and Mediastinum    Benign essential hypertension    Relevant Medications    verapamil SR (CALAN-SR) 180 MG CR tablet      Other Visit Diagnoses     Type 2 diabetes mellitus without complication, without long-term current use of insulin (CMS/[facility])    -  Primary    Relevant Medications    Empagliflozin-metFORMIN HCl ER 12.5-1000 MG tablet sustained-release 24 hour    glimepiride (AMARYL) 4 MG tablet    Other Relevant Orders    POC Glucose (Completed)    POC  Glycosylated Hemoglobin (Hb A1C) (Completed)    Uncontrolled type 2 diabetes mellitus without complication, without long-term current use of insulin (CMS/[facility])        Relevant Medications    Empagliflozin-metFORMIN HCl ER 12.5-1000 MG tablet sustained-release 24 hour    glimepiride (AMARYL) 4 MG tablet

## 2019-06-30 ENCOUNTER — APPOINTMENT (OUTPATIENT)
Dept: GENERAL RADIOLOGY | Facility: HOSPITAL | Age: 54
End: 2019-06-30

## 2019-06-30 ENCOUNTER — HOSPITAL ENCOUNTER (EMERGENCY)
Facility: HOSPITAL | Age: 54
Discharge: HOME | End: 2019-06-30
Attending: EMERGENCY MEDICINE | Admitting: EMERGENCY MEDICINE

## 2019-06-30 ENCOUNTER — APPOINTMENT (OUTPATIENT)
Dept: CT IMAGING | Facility: HOSPITAL | Age: 54
End: 2019-06-30

## 2019-06-30 ENCOUNTER — APPOINTMENT (OUTPATIENT)
Dept: MRI IMAGING | Facility: HOSPITAL | Age: 54
End: 2019-06-30

## 2019-06-30 VITALS
BODY MASS INDEX: 31.1 KG/M2 | DIASTOLIC BLOOD PRESSURE: 86 MMHG | RESPIRATION RATE: 18 BRPM | OXYGEN SATURATION: 98 % | TEMPERATURE: 99 F | HEART RATE: 86 BPM | SYSTOLIC BLOOD PRESSURE: 133 MMHG | WEIGHT: 169 LBS | HEIGHT: 62 IN

## 2019-06-30 DIAGNOSIS — F41.1 ANXIETY REACTION: ICD-10-CM

## 2019-06-30 DIAGNOSIS — E86.0 DEHYDRATION, MODERATE: Primary | ICD-10-CM

## 2019-06-30 LAB
ALT SERPL W P-5'-P-CCNC: 16 U/L (ref 1–33)
AMPHET+METHAMPHET UR QL: NEGATIVE
AMPHETAMINES UR QL: NEGATIVE
APTT PPP: 35.4 SECONDS (ref 24–37)
AST SERPL-CCNC: 20 U/L (ref 1–32)
BACTERIA UR QL AUTO: ABNORMAL /HPF
BARBITURATES UR QL SCN: NEGATIVE
BASOPHILS # BLD AUTO: 0.07 10*3/MM3 (ref 0–0.2)
BASOPHILS NFR BLD AUTO: 0.6 % (ref 0–1.5)
BENZODIAZ UR QL SCN: POSITIVE
BILIRUB UR QL STRIP: NEGATIVE
BUPRENORPHINE SERPL-MCNC: NEGATIVE NG/ML
CANNABINOIDS SERPL QL: NEGATIVE
CLARITY UR: ABNORMAL
COCAINE UR QL: NEGATIVE
COLOR UR: YELLOW
DEPRECATED RDW RBC AUTO: 42.4 FL (ref 37–54)
EOSINOPHIL # BLD AUTO: 0.13 10*3/MM3 (ref 0–0.4)
EOSINOPHIL NFR BLD AUTO: 1.2 % (ref 0.3–6.2)
ERYTHROCYTE [DISTWIDTH] IN BLOOD BY AUTOMATED COUNT: 13.4 % (ref 12.3–15.4)
GLUCOSE UR STRIP-MCNC: NEGATIVE MG/DL
HCT VFR BLD AUTO: 53.3 % (ref 34–46.6)
HGB BLD-MCNC: 17.6 G/DL (ref 12–15.9)
HGB UR QL STRIP.AUTO: NEGATIVE
HOLD SPECIMEN: NORMAL
HOLD SPECIMEN: NORMAL
HYALINE CASTS UR QL AUTO: ABNORMAL /LPF
IMM GRANULOCYTES # BLD AUTO: 0.05 10*3/MM3 (ref 0–0.05)
IMM GRANULOCYTES NFR BLD AUTO: 0.5 % (ref 0–0.5)
INR PPP: 0.9 (ref 0.8–1.2)
KETONES UR QL STRIP: NEGATIVE
LEUKOCYTE ESTERASE UR QL STRIP.AUTO: NEGATIVE
LYMPHOCYTES # BLD AUTO: 1.51 10*3/MM3 (ref 0.7–3.1)
LYMPHOCYTES NFR BLD AUTO: 13.6 % (ref 19.6–45.3)
MCH RBC QN AUTO: 28.6 PG (ref 26.6–33)
MCHC RBC AUTO-ENTMCNC: 33 G/DL (ref 31.5–35.7)
MCV RBC AUTO: 86.7 FL (ref 79–97)
METHADONE UR QL SCN: NEGATIVE
MONOCYTES # BLD AUTO: 0.69 10*3/MM3 (ref 0.1–0.9)
MONOCYTES NFR BLD AUTO: 6.2 % (ref 5–12)
NEUTROPHILS # BLD AUTO: 8.63 10*3/MM3 (ref 1.7–7)
NEUTROPHILS NFR BLD AUTO: 77.9 % (ref 42.7–76)
NITRITE UR QL STRIP: NEGATIVE
NRBC BLD AUTO-RTO: 0 /100 WBC (ref 0–0.2)
OPIATES UR QL: NEGATIVE
OXYCODONE UR QL SCN: NEGATIVE
PCP UR QL SCN: NEGATIVE
PH UR STRIP.AUTO: 5.5 [PH] (ref 5–8)
PLATELET # BLD AUTO: 250 10*3/MM3 (ref 140–450)
PMV BLD AUTO: 10.7 FL (ref 6–12)
PROPOXYPH UR QL: NEGATIVE
PROT UR QL STRIP: ABNORMAL
PROTHROMBIN TIME: 11 SECONDS (ref 12.8–15.2)
RBC # BLD AUTO: 6.15 10*6/MM3 (ref 3.77–5.28)
RBC # UR: ABNORMAL /HPF
REF LAB TEST METHOD: ABNORMAL
SP GR UR STRIP: 1.04 (ref 1–1.03)
SQUAMOUS #/AREA URNS HPF: ABNORMAL /HPF
TRICYCLICS UR QL SCN: NEGATIVE
TROPONIN T SERPL-MCNC: <0.01 NG/ML (ref 0–0.03)
TROPONIN T SERPL-MCNC: <0.01 NG/ML (ref 0–0.03)
UROBILINOGEN UR QL STRIP: ABNORMAL
WBC NRBC COR # BLD: 11.08 10*3/MM3 (ref 3.4–10.8)
WBC UR QL AUTO: ABNORMAL /HPF
WHOLE BLOOD HOLD SPECIMEN: NORMAL
WHOLE BLOOD HOLD SPECIMEN: NORMAL

## 2019-06-30 PROCEDURE — 99285 EMERGENCY DEPT VISIT HI MDM: CPT

## 2019-06-30 PROCEDURE — 93005 ELECTROCARDIOGRAM TRACING: CPT | Performed by: EMERGENCY MEDICINE

## 2019-06-30 PROCEDURE — 85610 PROTHROMBIN TIME: CPT

## 2019-06-30 PROCEDURE — 96374 THER/PROPH/DIAG INJ IV PUSH: CPT

## 2019-06-30 PROCEDURE — 84460 ALANINE AMINO (ALT) (SGPT): CPT | Performed by: EMERGENCY MEDICINE

## 2019-06-30 PROCEDURE — 71045 X-RAY EXAM CHEST 1 VIEW: CPT

## 2019-06-30 PROCEDURE — 84484 ASSAY OF TROPONIN QUANT: CPT | Performed by: EMERGENCY MEDICINE

## 2019-06-30 PROCEDURE — 0042T HC CT CEREBRAL PERFUSION W/WO CONTRAST: CPT

## 2019-06-30 PROCEDURE — 85025 COMPLETE CBC W/AUTO DIFF WBC: CPT | Performed by: EMERGENCY MEDICINE

## 2019-06-30 PROCEDURE — 85730 THROMBOPLASTIN TIME PARTIAL: CPT | Performed by: EMERGENCY MEDICINE

## 2019-06-30 PROCEDURE — 81001 URINALYSIS AUTO W/SCOPE: CPT | Performed by: EMERGENCY MEDICINE

## 2019-06-30 PROCEDURE — 70551 MRI BRAIN STEM W/O DYE: CPT

## 2019-06-30 PROCEDURE — 0 IOPAMIDOL PER 1 ML: Performed by: EMERGENCY MEDICINE

## 2019-06-30 PROCEDURE — 84450 TRANSFERASE (AST) (SGOT): CPT | Performed by: EMERGENCY MEDICINE

## 2019-06-30 PROCEDURE — 25010000002 KETOROLAC TROMETHAMINE PER 15 MG: Performed by: EMERGENCY MEDICINE

## 2019-06-30 PROCEDURE — 80306 DRUG TEST PRSMV INSTRMNT: CPT | Performed by: EMERGENCY MEDICINE

## 2019-06-30 PROCEDURE — 70450 CT HEAD/BRAIN W/O DYE: CPT

## 2019-06-30 RX ORDER — SODIUM CHLORIDE 0.9 % (FLUSH) 0.9 %
10 SYRINGE (ML) INJECTION AS NEEDED
Status: DISCONTINUED | OUTPATIENT
Start: 2019-06-30 | End: 2019-06-30 | Stop reason: HOSPADM

## 2019-06-30 RX ORDER — KETOROLAC TROMETHAMINE 15 MG/ML
10 INJECTION, SOLUTION INTRAMUSCULAR; INTRAVENOUS ONCE
Status: COMPLETED | OUTPATIENT
Start: 2019-06-30 | End: 2019-06-30

## 2019-06-30 RX ADMIN — KETOROLAC TROMETHAMINE 10 MG: 15 INJECTION, SOLUTION INTRAMUSCULAR; INTRAVENOUS at 19:25

## 2019-06-30 RX ADMIN — IOPAMIDOL 40 ML: 755 INJECTION, SOLUTION INTRAVENOUS at 16:18

## 2019-06-30 RX ADMIN — SODIUM CHLORIDE 1000 ML: 9 INJECTION, SOLUTION INTRAVENOUS at 17:07

## 2019-07-02 ENCOUNTER — TELEPHONE (OUTPATIENT)
Dept: CARDIOLOGY | Facility: CLINIC | Age: 54
End: 2019-07-02

## 2019-07-02 NOTE — TELEPHONE ENCOUNTER
Pt called to be seen sooner because she just had an ER visit on 06/30. She stated ER docs told her it was anxiety/BP. She states she is still having mild palpitations with metoprolol and wants to see RDS regarding this. Advised we can work her in sooner to f/u with hospital visit and palpitations. LVM with Magalie to reschedule pt. Pt is aware we call her to set up new apt date and time.

## 2019-07-09 ENCOUNTER — OFFICE VISIT (OUTPATIENT)
Dept: FAMILY MEDICINE CLINIC | Facility: CLINIC | Age: 54
End: 2019-07-09

## 2019-07-09 ENCOUNTER — OFFICE VISIT (OUTPATIENT)
Dept: CARDIOLOGY | Facility: CLINIC | Age: 54
End: 2019-07-09

## 2019-07-09 VITALS
BODY MASS INDEX: 31.94 KG/M2 | HEART RATE: 98 BPM | SYSTOLIC BLOOD PRESSURE: 108 MMHG | HEIGHT: 62 IN | WEIGHT: 173.6 LBS | RESPIRATION RATE: 16 BRPM | OXYGEN SATURATION: 98 % | DIASTOLIC BLOOD PRESSURE: 70 MMHG

## 2019-07-09 VITALS
HEIGHT: 62 IN | WEIGHT: 175 LBS | HEART RATE: 105 BPM | OXYGEN SATURATION: 94 % | BODY MASS INDEX: 32.2 KG/M2 | SYSTOLIC BLOOD PRESSURE: 130 MMHG | DIASTOLIC BLOOD PRESSURE: 72 MMHG

## 2019-07-09 DIAGNOSIS — I10 BENIGN ESSENTIAL HYPERTENSION: ICD-10-CM

## 2019-07-09 DIAGNOSIS — M79.605 PAIN OF LEFT LOWER EXTREMITY: Primary | ICD-10-CM

## 2019-07-09 DIAGNOSIS — I10 ESSENTIAL HYPERTENSION: Primary | ICD-10-CM

## 2019-07-09 DIAGNOSIS — R07.2 PRECORDIAL PAIN: ICD-10-CM

## 2019-07-09 DIAGNOSIS — E11.65 UNCONTROLLED TYPE 2 DIABETES MELLITUS WITH HYPERGLYCEMIA (HCC): ICD-10-CM

## 2019-07-09 PROBLEM — E78.5 HYPERLIPIDEMIA LDL GOAL <70: Status: ACTIVE | Noted: 2019-07-09

## 2019-07-09 PROCEDURE — 93000 ELECTROCARDIOGRAM COMPLETE: CPT | Performed by: NURSE PRACTITIONER

## 2019-07-09 PROCEDURE — 99213 OFFICE O/P EST LOW 20 MIN: CPT | Performed by: PHYSICIAN ASSISTANT

## 2019-07-09 PROCEDURE — 99213 OFFICE O/P EST LOW 20 MIN: CPT | Performed by: NURSE PRACTITIONER

## 2019-07-09 RX ORDER — ISOSORBIDE MONONITRATE 30 MG/1
60 TABLET, EXTENDED RELEASE ORAL DAILY
Qty: 30 TABLET | Refills: 11 | Status: SHIPPED | OUTPATIENT
Start: 2019-07-09 | End: 2020-07-21

## 2019-07-09 RX ORDER — TIZANIDINE HYDROCHLORIDE 2 MG/1
2 CAPSULE, GELATIN COATED ORAL DAILY
Qty: 14 CAPSULE | Refills: 1 | Status: SHIPPED | OUTPATIENT
Start: 2019-07-09 | End: 2020-11-12

## 2019-07-09 NOTE — PROGRESS NOTES
Subjective   Raquel Espitia is a 54 y.o. female  Arm Pain      History of Present Illness  Patient is a pleasant 54-year-old white female who comes in complaining of left arm pain denies any trauma injury patient states she has tingling on occasion she takes Valium and pain medication for pain relief she states is some mild tingling muscle spasm-like pain on denies any recent injury or trauma  The following portions of the patient's history were reviewed and updated as appropriate: allergies, current medications, past social history and problem list    Review of Systems   Constitutional: Negative for appetite change, diaphoresis, fatigue and unexpected weight change.   Eyes: Negative for visual disturbance.   Respiratory: Negative for cough, chest tightness and shortness of breath.    Cardiovascular: Negative for chest pain, palpitations and leg swelling.   Gastrointestinal: Negative for diarrhea, nausea and vomiting.   Endocrine: Negative for polydipsia, polyphagia and polyuria.   Skin: Negative for color change and rash.   Neurological: Negative for dizziness, syncope, weakness, light-headedness, numbness and headaches.       Objective     Vitals:    07/09/19 0859   BP: 108/70   Pulse: 98   Resp: 16   SpO2: 98%       Physical Exam   Constitutional: She appears well-developed and well-nourished.   Neck: Neck supple. No JVD present. No thyromegaly present.   Cardiovascular: Normal rate, regular rhythm, normal heart sounds, intact distal pulses and normal pulses.   No murmur heard.  Pulmonary/Chest: Effort normal and breath sounds normal. No respiratory distress.   Abdominal: Soft. Bowel sounds are normal. There is no hepatosplenomegaly. There is no tenderness.   Musculoskeletal: She exhibits no edema.   Lymphadenopathy:     She has no cervical adenopathy.   Neurological: No sensory deficit.   Skin: Skin is warm and dry. She is not diaphoretic.   Nursing note and vitals reviewed.      Assessment/Plan     Diagnoses and  all orders for this visit:    arm pain    Other orders  -     TiZANidine (ZANAFLEX) 2 MG capsule; Take 1 capsule by mouth Daily.    FOLLOW AS NEEDED

## 2019-07-09 NOTE — PROGRESS NOTES
OFFICE VISIT  NOTE  [facility] CARDIOLOGY      Name: Raquel Espitia    Date: 2019  MRN:  [id number]  :  1965      REFERRING/PRIMARY PROVIDER:  No ref. provider found    Chief Complaint   Patient presents with   • Hypertension   • Chest Pain       HPI: Raquel Espitia is a 54 y.o. female who presents today for follow-up of chest pain.  She has associated history of diabetes mellitus type 2, hypertension, tobacco abuse, GERD.  Stress test and echocardiogram results were benign in 2019. She was started on metoprolol 12.5 mg bid at last visit without significant improvement. She presented to ED last week with c/o left arm numbness and loss of movement. Symptoms developed after an argument with a family member. Brain imaging was negative and CVA was felt unlikely. She was d/c'd home with recs to f/u with PCP. She saw them today with recs to start Zanaflex. She continues to have chest pains. Today she states they are short-lived (only a few seconds). Brought on by stress. No radiation, associated symptoms or alleviating factors. She is still smoking 1 pack/day but recently started Chantix to help quit.  She denies palpitations, dizziness, lightheadedness or syncope.      Past Medical History:   Diagnosis Date   • Acute bronchitis    • Asthma    • Diabetes mellitus (CMS/HCC)    • Edema    • GERD (gastroesophageal reflux disease)    • Hypertension    • Impaired fasting glucose    • Low back pain    • Pain of left calf        Past Surgical History:   Procedure Laterality Date   • BACK SURGERY     •  SECTION     • CHOLECYSTECTOMY     • HAND SURGERY     • NECK SURGERY         Social History     Socioeconomic History   • Marital status: Legally      Spouse name: Not on file   • Number of children: Not on file   • Years of education: Not on file   • Highest education level: Not on file   Tobacco Use   • Smoking status: Current Every Day Smoker     Packs/day: 1.00      Types: Cigarettes   • Smokeless tobacco: Never Used   • Tobacco comment: 7/9/19-still on Chantix. Prev smoked 1 1/2-2 ppd   Substance and Sexual Activity   • Alcohol use: No     Frequency: Never     Comment: rare   • Drug use: No   • Sexual activity: Defer       Family History   Problem Relation Age of Onset   • Hypertension Mother    • Stroke Mother    • Cancer Father         ROS:   Constitutional no fever,  no weight loss   Skin no rash, no subcutaneous nodules   Otolaryngeal no difficulty swallowing   Cardiovascular See HPI   Pulmonary no cough, no sputum production   Gastrointestinal no constipation, no diarrhea   Genitourinary no dysuria, no hematuria   Hematologic no easy bruisability, no abnormal bleeding   Musculoskeletal no muscle pain   Neurologic no dizziness, no falls     No Known Allergies      Current Outpatient Medications:   •  aspirin 81 MG tablet, Take 81 mg by mouth Daily., Disp: , Rfl:   •  atorvastatin (LIPITOR) 40 MG tablet, Take 1 tablet by mouth Every Night., Disp: 90 tablet, Rfl: 3  •  diazePAM (VALIUM) 10 MG tablet, take 1 tablet by mouth every 8 hours if needed for anxiety, Disp: 75 tablet, Rfl: 5  •  Empagliflozin-metFORMIN HCl ER 12.5-1000 MG tablet sustained-release 24 hour, Take 1 tablet by mouth 2 (Two) Times a Day., Disp: 60 tablet, Rfl: 5  •  gabapentin (NEURONTIN) 800 MG tablet, Take 800 mg by mouth 3 (Three) Times a Day., Disp: , Rfl: 0  •  glimepiride (AMARYL) 4 MG tablet, Take 1 tablet by mouth 2 (Two) Times a Day With Meals., Disp: 60 tablet, Rfl: 11  •  HYDROcodone-acetaminophen (NORCO) 7.5-325 MG per tablet, take 1 tablet by mouth four times a day if needed, Disp: , Rfl: 0  •  metoprolol tartrate (LOPRESSOR) 25 MG tablet, Take 0.5 tablets by mouth 2 (Two) Times a Day., Disp: 30 tablet, Rfl: 11  •  nortriptyline (PAMELOR) 25 MG capsule, Take 1 capsule by mouth Every Night., Disp: 30 capsule, Rfl: 11  •  omeprazole (priLOSEC) 40 MG capsule, Take 1 capsule by mouth Daily., Disp:  "30 capsule, Rfl: 11  •  raNITIdine (ZANTAC) 150 MG tablet, take 1 tablet by mouth every morning AND CONTINUE TAKING PRILOSEC...  (REFER TO PRESCRIPTION NOTES)., Disp: , Rfl: 0  •  TiZANidine (ZANAFLEX) 2 MG capsule, Take 1 capsule by mouth Daily., Disp: 14 capsule, Rfl: 1  •  varenicline (CHANTIX CONTINUING MONTH SHANDA) 1 MG tablet, Take 1 tablet by mouth 2 (Two) Times a Day., Disp: 60 tablet, Rfl: 11  •  verapamil SR (CALAN-SR) 180 MG CR tablet, Take 1 tablet by mouth Every Night., Disp: 30 tablet, Rfl: 11    Vitals:    07/09/19 1251   BP: 130/72   BP Location: Right arm   Patient Position: Sitting   Pulse: 105   SpO2: 94%   Weight: 79.4 kg (175 lb)   Height: 157.5 cm (62\")     Body mass index is 32.01 kg/m².    PHYSICAL EXAM:    General Appearance:   · well developed  · well nourished  HENT:   · oropharynx moist  · lips not cyanotic  Neck:  · thyroid not enlarged  · supple  Respiratory:  · no respiratory distress  · normal breath sounds  · no rales  Cardiovascular:  · no jugular venous distention  · regular rhythm  · apical impulse normal  · S1 normal, S2 normal  · no S3, no S4   · no murmur  · no rub, no thrill  · carotid pulses normal; no bruit  · pedal pulses normal  · lower extremity edema: none    Gastrointestinal:   · bowel sounds normal  · non-tender  · no hepatomegaly, no splenomegaly  Musculoskeletal:  · no clubbing of fingers.   · normocephalic, head atraumatic  Skin:   · warm, dry  Psychiatric:  · judgement and insight appropriate  · normal mood and affect    RESULTS:     ECG 12 Lead  Date/Time: 7/9/2019 1:34 PM  Performed by: Anita Guy APRN  Authorized by: Anita Guy APRN   Comparison: compared with previous ECG from 6/30/2019  Rhythm: sinus rhythm  BPM: 97              Results for orders placed during the hospital encounter of 03/13/19   Adult Transthoracic Echo Complete W/ Cont if Necessary Per Protocol    Narrative · Left ventricular systolic function is normal.  · Estimated " EF appears to be in the range of 56 - 60%  · Left ventricular diastolic dysfunction.  · No significant valvular disease.        Labs:  Lab Results   Component Value Date    CHOL 220 (H) 03/13/2019    TRIG 303 (H) 03/13/2019    HDL 40 03/13/2019     (H) 03/13/2019    AST 20 06/30/2019    ALT 16 06/30/2019     Lab Results   Component Value Date    HGBA1C 8.5 06/05/2019     No components found for: CREATINININE  eGFR Non  Amer   Date Value Ref Range Status   01/08/2019 129 >60 mL/min/1.73 Final   10/18/2016 75 >60 mL/min/1.73 Final         ASSESSMENT:  Problem List Items Addressed This Visit     None          PLAN:    1.  Chest pain:  Stress test findings were low risk  Echocardiogram showed normal ejection fraction and no significant valvular disease.  On metoprolol 12.5 mg bid with no sig improvement.   Will start Imdur and reevaluate in 2 week via phone. If symptoms persists, will plan to proceed with LHC plus/minus CBI.   The patient was advised to call 911 if chest pain worsens or persist despite nitroglycerin or rest.    2.  Tobacco abuse:  Recently started Chantix  Encouraged smoking cessation.     3.  Diabetes mellitus type 2:  Goal A1c less than 7.0  Continue SGLT 2 inhibitor    4.  Hypertension:  Long-term goal blood pressure less than 130/80  Continue verapamil and low dose BB.    5. Hyperlipidemia  -  (suboptimal in diabetic).   - On atorvastatin 40 mg daily since March.   - Needs FLP. Will check at f/u in August since not fasting today.       Electronically signed by LOURDES Pelayo, 07/09/19, 1:18 PM.

## 2019-10-08 ENCOUNTER — OFFICE VISIT (OUTPATIENT)
Dept: FAMILY MEDICINE CLINIC | Facility: CLINIC | Age: 54
End: 2019-10-08

## 2019-10-08 VITALS
RESPIRATION RATE: 20 BRPM | HEIGHT: 62 IN | SYSTOLIC BLOOD PRESSURE: 130 MMHG | BODY MASS INDEX: 33.16 KG/M2 | DIASTOLIC BLOOD PRESSURE: 68 MMHG | TEMPERATURE: 98.1 F | HEART RATE: 95 BPM | WEIGHT: 180.2 LBS | OXYGEN SATURATION: 97 %

## 2019-10-08 DIAGNOSIS — J40 BRONCHITIS: Primary | ICD-10-CM

## 2019-10-08 PROCEDURE — 99213 OFFICE O/P EST LOW 20 MIN: CPT | Performed by: PHYSICIAN ASSISTANT

## 2019-10-08 RX ORDER — PREDNISONE 20 MG/1
20 TABLET ORAL 2 TIMES DAILY
Qty: 14 TABLET | Refills: 0 | Status: SHIPPED | OUTPATIENT
Start: 2019-10-08 | End: 2019-11-01

## 2019-10-08 RX ORDER — DEXTROMETHORPHAN HYDROBROMIDE AND PROMETHAZINE HYDROCHLORIDE 15; 6.25 MG/5ML; MG/5ML
2.5 SYRUP ORAL 4 TIMES DAILY PRN
Qty: 120 ML | Refills: 1 | Status: SHIPPED | OUTPATIENT
Start: 2019-10-08 | End: 2019-11-01

## 2019-10-08 RX ORDER — CEFDINIR 300 MG/1
300 CAPSULE ORAL 2 TIMES DAILY
Qty: 20 CAPSULE | Refills: 0 | Status: SHIPPED | OUTPATIENT
Start: 2019-10-08 | End: 2019-11-01

## 2019-10-08 NOTE — PROGRESS NOTES
Tien Espitia is a 54 y.o. female  Cough (Sore throat, PND x1 day. Took Tylenol Cold & Sinus this morning)      Cough   This is a new problem. The current episode started in the past 7 days. The cough is productive of purulent sputum and productive of brown sputum. Associated symptoms include ear pain, headaches, postnasal drip, rhinorrhea and wheezing. Pertinent negatives include no chest pain, chills, fever, myalgias, sore throat or shortness of breath. The symptoms are aggravated by lying down.       The following portions of the patient's history were reviewed and updated as appropriate: allergies, current medications, past social history and problem list    Review of Systems   Constitutional: Negative for chills, fatigue and fever.   HENT: Positive for congestion, ear pain, postnasal drip, rhinorrhea and sinus pressure. Negative for sore throat.    Eyes: Positive for discharge and itching. Negative for pain.   Respiratory: Positive for cough, chest tightness and wheezing. Negative for shortness of breath.    Cardiovascular: Negative for chest pain.   Gastrointestinal: Negative.  Negative for nausea.   Genitourinary: Negative.    Musculoskeletal: Negative for myalgias.   Neurological: Positive for light-headedness and headaches. Negative for dizziness.   Hematological: Negative for adenopathy.   Psychiatric/Behavioral: Positive for sleep disturbance.       Objective     Vitals:    10/08/19 1033   BP: 130/68   Pulse: 95   Resp: 20   Temp: 98.1 °F (36.7 °C)   SpO2: 97%       Physical Exam   Constitutional: She appears well-developed and well-nourished. No distress.   HENT:   Head: Normocephalic and atraumatic.   Right Ear: Tympanic membrane and ear canal normal.   Left Ear: Tympanic membrane and ear canal normal.   Nose: Nose normal.   Mouth/Throat: Oropharynx is clear and moist. No oropharyngeal exudate.   Eyes: Pupils are equal, round, and reactive to light.   Neck: Neck supple. No JVD present.    Cardiovascular: Normal rate, regular rhythm and normal heart sounds.   No murmur heard.  Pulmonary/Chest: Effort normal. No stridor. No respiratory distress. She has wheezes.   Musculoskeletal: She exhibits no edema.   Lymphadenopathy:     She has no cervical adenopathy.   Skin: She is not diaphoretic.   Nursing note and vitals reviewed.      Assessment/Plan     Diagnoses and all orders for this visit:    Bronchitis  -     cefdinir (OMNICEF) 300 MG capsule; Take 1 capsule by mouth 2 (Two) Times a Day.  -     predniSONE (DELTASONE) 20 MG tablet; Take 1 tablet by mouth 2 (Two) Times a Day.  -     promethazine-dextromethorphan (PROMETHAZINE-DM) 6.25-15 MG/5ML syrup; Take 2.5 mL by mouth 4 (Four) Times a Day As Needed for Cough.    follow Up as needed

## 2019-10-08 NOTE — PROGRESS NOTES
Subjective   Raquel Espitia is a 54 y.o. female  Cough (Sore throat, PND x1 day. Took Tylenol Cold & Sinus this morning)      History of Present Illness    The following portions of the patient's history were reviewed and updated as appropriate: allergies, current medications, past social history and problem list    Review of Systems    Objective     Vitals:    10/08/19 1033   BP: 130/68   Pulse: 95   Resp: 20   Temp: 98.1 °F (36.7 °C)   SpO2: 97%       Physical Exam    Assessment/Plan     Diagnoses and all orders for this visit:    Bronchitis    Other orders  -     cefdinir (OMNICEF) 300 MG capsule; Take 1 capsule by mouth 2 (Two) Times a Day.  -     predniSONE (DELTASONE) 20 MG tablet; Take 1 tablet by mouth 2 (Two) Times a Day.  -     promethazine-dextromethorphan (PROMETHAZINE-DM) 6.25-15 MG/5ML syrup; Take 2.5 mL by mouth 4 (Four) Times a Day As Needed for Cough.

## 2019-10-09 NOTE — PROGRESS NOTES
I have reviewed the notes, assessments, and/or procedures performed by Geremias Acosta PA-C, I concur with his documentation of Raquel Espitia.

## 2019-10-15 ENCOUNTER — TELEPHONE (OUTPATIENT)
Dept: FAMILY MEDICINE CLINIC | Facility: CLINIC | Age: 54
End: 2019-10-15

## 2019-10-15 NOTE — TELEPHONE ENCOUNTER
----- Message from Tressa Finney sent at 10/14/2019 12:04 PM EDT -----  Contact: PATIENT  NEEDS PA ON SYNJARDY (HAS BEEN OUT FOR FOUR DAYS)     [facility] [place], [place] - [address] [place] [address] - [phone]  - [phone] (Phone)  [phone] (Fax)

## 2019-10-15 NOTE — TELEPHONE ENCOUNTER
----- Message from Tressa Finney sent at 10/14/2019  5:03 PM EDT -----  Contact: PATIENT  CAN SHE GET SOME METFORMIN SENT INTO RX UNTIL THE PA GOES THROUGH FOR THE OTHER DM MEDICATION.

## 2019-11-01 ENCOUNTER — OFFICE VISIT (OUTPATIENT)
Dept: FAMILY MEDICINE CLINIC | Facility: CLINIC | Age: 54
End: 2019-11-01

## 2019-11-01 VITALS
HEIGHT: 62 IN | BODY MASS INDEX: 32.54 KG/M2 | SYSTOLIC BLOOD PRESSURE: 140 MMHG | TEMPERATURE: 98.2 F | OXYGEN SATURATION: 98 % | WEIGHT: 176.8 LBS | HEART RATE: 105 BPM | DIASTOLIC BLOOD PRESSURE: 78 MMHG | RESPIRATION RATE: 18 BRPM

## 2019-11-01 DIAGNOSIS — J40 BRONCHITIS: Primary | ICD-10-CM

## 2019-11-01 PROCEDURE — 99213 OFFICE O/P EST LOW 20 MIN: CPT | Performed by: PHYSICIAN ASSISTANT

## 2019-11-01 RX ORDER — PREDNISONE 20 MG/1
20 TABLET ORAL 2 TIMES DAILY
Qty: 14 TABLET | Refills: 0 | Status: SHIPPED | OUTPATIENT
Start: 2019-11-01 | End: 2019-11-22

## 2019-11-01 RX ORDER — ALBUTEROL SULFATE 90 UG/1
2 AEROSOL, METERED RESPIRATORY (INHALATION) EVERY 4 HOURS PRN
Qty: 6.7 G | Refills: 1 | Status: SHIPPED | OUTPATIENT
Start: 2019-11-01 | End: 2020-07-07 | Stop reason: SDUPTHER

## 2019-11-01 RX ORDER — CEFDINIR 300 MG/1
300 CAPSULE ORAL 2 TIMES DAILY
Qty: 20 CAPSULE | Refills: 0 | Status: SHIPPED | OUTPATIENT
Start: 2019-11-01 | End: 2019-11-22

## 2019-11-01 NOTE — PROGRESS NOTES
Tien Espitia is a 54 y.o. female  Nasal Congestion (PND, prodcutive cough (clear), sore throat x1 week)      Cough   This is a new problem. The current episode started in the past 7 days. The cough is productive of purulent sputum and productive of brown sputum. Associated symptoms include ear pain, headaches, nasal congestion, postnasal drip, rhinorrhea, a sore throat and wheezing. Pertinent negatives include no chest pain, chills, fever or shortness of breath. Nothing aggravates the symptoms. The treatment provided mild relief.       The following portions of the patient's history were reviewed and updated as appropriate: allergies, current medications, past social history and problem list    Review of Systems   Constitutional: Negative for chills, fatigue and fever.   HENT: Positive for congestion, ear pain, postnasal drip, rhinorrhea, sinus pressure and sore throat.    Eyes: Positive for pain.   Respiratory: Positive for cough, chest tightness and wheezing. Negative for shortness of breath.    Cardiovascular: Negative for chest pain.   Gastrointestinal: Negative for nausea.   Neurological: Positive for headaches. Negative for dizziness.   Hematological: Negative for adenopathy.       Objective     Vitals:    11/01/19 1444   BP: 140/78   Pulse: 105   Resp: 18   Temp: 98.2 °F (36.8 °C)   SpO2: 98%       Physical Exam   Constitutional: She appears well-developed and well-nourished. No distress.   HENT:   Head: Normocephalic and atraumatic.   Right Ear: Tympanic membrane and ear canal normal.   Left Ear: Tympanic membrane and ear canal normal.   Nose: Mucosal edema, rhinorrhea and sinus tenderness present. Right sinus exhibits maxillary sinus tenderness and frontal sinus tenderness. Left sinus exhibits maxillary sinus tenderness and frontal sinus tenderness.   Mouth/Throat: Oropharynx is clear and moist. No oropharyngeal exudate.   Eyes: Pupils are equal, round, and reactive to light.   Neck: Neck  supple. No JVD present.   Cardiovascular: Normal rate, regular rhythm and normal heart sounds.   No murmur heard.  Pulmonary/Chest: Effort normal. No stridor. No respiratory distress. She has wheezes. She has rales.   Musculoskeletal: She exhibits no edema.   Lymphadenopathy:     She has no cervical adenopathy.   Skin: She is not diaphoretic.   Nursing note and vitals reviewed.      Assessment/Plan     Diagnoses and all orders for this visit:    Bronchitis  -     cefdinir (OMNICEF) 300 MG capsule; Take 1 capsule by mouth 2 (Two) Times a Day.  -     predniSONE (DELTASONE) 20 MG tablet; Take 1 tablet by mouth 2 (Two) Times a Day.  -     albuterol sulfate  (90 Base) MCG/ACT inhaler; Inhale 2 puffs Every 4 (Four) Hours As Needed for Wheezing.    follow up needed

## 2019-11-15 DIAGNOSIS — F41.9 ANXIETY: ICD-10-CM

## 2019-11-15 RX ORDER — DIAZEPAM 10 MG/1
TABLET ORAL
Qty: 75 TABLET | Refills: 5 | Status: CANCELLED | OUTPATIENT
Start: 2019-11-15

## 2019-11-22 ENCOUNTER — OFFICE VISIT (OUTPATIENT)
Dept: FAMILY MEDICINE CLINIC | Facility: CLINIC | Age: 54
End: 2019-11-22

## 2019-11-22 VITALS
OXYGEN SATURATION: 98 % | HEART RATE: 104 BPM | SYSTOLIC BLOOD PRESSURE: 142 MMHG | DIASTOLIC BLOOD PRESSURE: 84 MMHG | WEIGHT: 173 LBS | BODY MASS INDEX: 31.63 KG/M2 | TEMPERATURE: 97.9 F

## 2019-11-22 DIAGNOSIS — F41.9 ANXIETY: ICD-10-CM

## 2019-11-22 DIAGNOSIS — J40 BRONCHITIS: ICD-10-CM

## 2019-11-22 DIAGNOSIS — E11.9 TYPE 2 DIABETES MELLITUS WITHOUT COMPLICATION, WITHOUT LONG-TERM CURRENT USE OF INSULIN (HCC): Primary | ICD-10-CM

## 2019-11-22 LAB
GLUCOSE BLDC GLUCOMTR-MCNC: 286 MG/DL (ref 70–130)
HBA1C MFR BLD: 11.7 %

## 2019-11-22 PROCEDURE — 82962 GLUCOSE BLOOD TEST: CPT | Performed by: FAMILY MEDICINE

## 2019-11-22 PROCEDURE — 99213 OFFICE O/P EST LOW 20 MIN: CPT | Performed by: FAMILY MEDICINE

## 2019-11-22 PROCEDURE — 83036 HEMOGLOBIN GLYCOSYLATED A1C: CPT | Performed by: FAMILY MEDICINE

## 2019-11-22 RX ORDER — LEVOFLOXACIN 500 MG/1
500 TABLET, FILM COATED ORAL DAILY
Qty: 10 TABLET | Refills: 0 | Status: SHIPPED | OUTPATIENT
Start: 2019-11-22 | End: 2020-11-12

## 2019-11-22 RX ORDER — DIAZEPAM 10 MG/1
10 TABLET ORAL EVERY 8 HOURS PRN
Qty: 75 TABLET | Refills: 5 | Status: SHIPPED | OUTPATIENT
Start: 2019-11-22 | End: 2020-07-07 | Stop reason: SDUPTHER

## 2019-11-22 NOTE — PROGRESS NOTES
"Subjective   Raquel Espitia is a 54 y.o. female    Chief Complaint    Hoarseness  Persisting cough  Diabetes mellitus    History of Present Illness  Patient presents today with persisting hoarseness as well as persisting cough.  She has been through 2 rounds of antibiotics with Omnicef, 2 courses of prednisone and cough medication.  She feels she is better as her cough is less productive and her sputum is clear but her blood sugar has been significantly elevated.  She is also frustrated with the persisting hoarseness.  Her current insurance carrier will not cover her previous diabetes medication Jardiance so she has been on a combination of metformin and Amaryl.  Her blood sugar today is 286 up from previous 184 and her hemoglobin A1c is up to 11.7% up from previous 8.5%.  This is since her insurance company has taken away her Jardiance.  We will check to see if they will cover any of the other \"flozins\" since that has worked so well for her.    The following portions of the patient's history were reviewed and updated as appropriate: allergies, current medications, past social history and problem list    Review of Systems   Constitutional: Negative for appetite change, chills, diaphoresis, fatigue, fever and unexpected weight change.   HENT: Positive for voice change. Negative for sore throat.    Eyes: Negative for visual disturbance.   Respiratory: Positive for cough and wheezing. Negative for chest tightness and shortness of breath.    Cardiovascular: Negative for chest pain, palpitations and leg swelling.   Gastrointestinal: Negative for abdominal pain, diarrhea, nausea and vomiting.   Endocrine: Negative for polydipsia, polyphagia and polyuria.   Skin: Negative for color change.   Neurological: Negative for dizziness, tremors, weakness, light-headedness, numbness and headaches.   Psychiatric/Behavioral: Positive for dysphoric mood and sleep disturbance. Negative for agitation, behavioral problems, confusion, " decreased concentration and suicidal ideas. The patient is nervous/anxious.        Objective     Vitals:    11/22/19 1022   BP: 142/84   Pulse: 104   Temp: 97.9 °F (36.6 °C)   SpO2: 98%       Physical Exam   Constitutional: She appears well-developed and well-nourished. No distress.   HENT:   Head: Normocephalic and atraumatic.   Nose: Nose normal.   Mouth/Throat: Oropharynx is clear and moist.   Eyes: Conjunctivae are normal. Pupils are equal, round, and reactive to light.   Neck: Neck supple. No JVD present. No thyromegaly present.   Cardiovascular: Normal rate, regular rhythm, normal heart sounds and intact distal pulses.   No murmur heard.  Pulmonary/Chest: Effort normal. No stridor. No respiratory distress. She has wheezes.   Abdominal: Soft. Bowel sounds are normal.   Musculoskeletal: She exhibits no edema.   Lymphadenopathy:     She has no cervical adenopathy.   Neurological: No sensory deficit.   Skin: Skin is warm and dry. She is not diaphoretic.   Psychiatric: She has a normal mood and affect. Her behavior is normal.   Nursing note and vitals reviewed.      Assessment/Plan   Problem List Items Addressed This Visit        Other    Anxiety    Relevant Medications    diazePAM (VALIUM) 10 MG tablet      Other Visit Diagnoses     Type 2 diabetes mellitus without complication, without long-term current use of insulin (CMS/[facility])    -  Primary    Relevant Orders    POC Glucose (Completed)    POC Glycosylated Hemoglobin (Hb A1C) (Completed)    Bronchitis        Relevant Medications    levoFLOXacin (LEVAQUIN) 500 MG tablet

## 2019-12-06 ENCOUNTER — OFFICE VISIT (OUTPATIENT)
Dept: FAMILY MEDICINE CLINIC | Facility: CLINIC | Age: 54
End: 2019-12-06

## 2019-12-06 VITALS
HEART RATE: 90 BPM | HEIGHT: 62 IN | WEIGHT: 173 LBS | DIASTOLIC BLOOD PRESSURE: 72 MMHG | SYSTOLIC BLOOD PRESSURE: 120 MMHG | BODY MASS INDEX: 31.83 KG/M2 | TEMPERATURE: 97 F | OXYGEN SATURATION: 98 %

## 2019-12-06 DIAGNOSIS — Z72.0 TOBACCO ABUSE: ICD-10-CM

## 2019-12-06 DIAGNOSIS — R49.0 PERSISTENT HOARSENESS: Primary | ICD-10-CM

## 2019-12-06 PROCEDURE — 99213 OFFICE O/P EST LOW 20 MIN: CPT | Performed by: FAMILY MEDICINE

## 2019-12-06 RX ORDER — VARENICLINE TARTRATE 1 MG/1
1 TABLET, FILM COATED ORAL 2 TIMES DAILY
Qty: 60 TABLET | Refills: 11 | Status: SHIPPED | OUTPATIENT
Start: 2019-12-06 | End: 2020-11-12 | Stop reason: SDUPTHER

## 2019-12-06 NOTE — PROGRESS NOTES
Subjective   Raquel Espitia is a 54 y.o. female    Chief Complaint    Persistent hoarseness    History of Present Illness  Patient returns with persistent hoarseness.  She has been on multiple antibiotics several rounds of steroids and is on chronic acid reducing medication including both PPI and H2 blockers.  She is a chronic smoker so vocal cord abnormalities are certainly a worrisome entity.  This is reviewed with the patient.  I recommend an ENT consultation for direct cord visualization.  The patient agrees.    The following portions of the patient's history were reviewed and updated as appropriate: allergies, current medications, past social history and problem list    Review of Systems   Constitutional: Negative for appetite change, chills, diaphoresis, fatigue, fever and unexpected weight change.   HENT: Positive for voice change. Negative for sore throat.    Eyes: Negative for visual disturbance.   Respiratory: Positive for cough and wheezing. Negative for chest tightness and shortness of breath.    Cardiovascular: Negative for chest pain, palpitations and leg swelling.   Gastrointestinal: Negative for abdominal pain, diarrhea, nausea and vomiting.   Endocrine: Negative for polydipsia, polyphagia and polyuria.   Skin: Negative for color change.   Neurological: Negative for dizziness, tremors, weakness, light-headedness, numbness and headaches.   Psychiatric/Behavioral: Positive for dysphoric mood and sleep disturbance. Negative for agitation, behavioral problems, confusion, decreased concentration and suicidal ideas. The patient is nervous/anxious.        Objective     Vitals:    12/06/19 0933   BP: 120/72   Pulse: 90   Temp: 97 °F (36.1 °C)   SpO2: 98%       Physical Exam   Constitutional: She appears well-developed and well-nourished. No distress.   HENT:   Head: Normocephalic and atraumatic.   Nose: Nose normal.   Mouth/Throat: Oropharynx is clear and moist.   Voice is hoarse.   Eyes: Conjunctivae are  normal. Pupils are equal, round, and reactive to light.   Neck: Neck supple. No JVD present. No thyromegaly present.   Cardiovascular: Normal rate, regular rhythm, normal heart sounds and intact distal pulses.   No murmur heard.  Pulmonary/Chest: Effort normal. No stridor. No respiratory distress. She has wheezes.   Abdominal: Soft. Bowel sounds are normal.   Musculoskeletal: She exhibits no edema.   Lymphadenopathy:     She has no cervical adenopathy.   Neurological: No sensory deficit.   Skin: Skin is warm and dry. She is not diaphoretic.   Psychiatric: She has a normal mood and affect. Her behavior is normal.   Nursing note and vitals reviewed.      Assessment/Plan   Problem List Items Addressed This Visit        Other    Tobacco abuse    Relevant Orders    Ambulatory Referral to ENT (Otolaryngology)      Other Visit Diagnoses     Persistent hoarseness    -  Primary    Relevant Orders    Ambulatory Referral to ENT (Otolaryngology)        Continue proton pump inhibitor

## 2019-12-12 ENCOUNTER — TELEPHONE (OUTPATIENT)
Dept: FAMILY MEDICINE CLINIC | Facility: CLINIC | Age: 54
End: 2019-12-12

## 2019-12-12 NOTE — TELEPHONE ENCOUNTER
Patient was calling to check the status of her ENT referral please call patient back asap patient is having trouble speaking so she would like this appointment made asap as well [phone]

## 2020-02-13 ENCOUNTER — CLINICAL SUPPORT (OUTPATIENT)
Dept: FAMILY MEDICINE CLINIC | Facility: CLINIC | Age: 55
End: 2020-02-13

## 2020-02-13 ENCOUNTER — OFFICE VISIT (OUTPATIENT)
Dept: FAMILY MEDICINE CLINIC | Facility: CLINIC | Age: 55
End: 2020-02-13

## 2020-02-13 DIAGNOSIS — E11.9 TYPE 2 DIABETES MELLITUS WITHOUT COMPLICATION, WITHOUT LONG-TERM CURRENT USE OF INSULIN (HCC): Primary | ICD-10-CM

## 2020-02-13 LAB — HBA1C MFR BLD: 9.8 %

## 2020-02-13 PROCEDURE — 99212 OFFICE O/P EST SF 10 MIN: CPT | Performed by: FAMILY MEDICINE

## 2020-02-13 PROCEDURE — 83036 HEMOGLOBIN GLYCOSYLATED A1C: CPT | Performed by: FAMILY MEDICINE

## 2020-02-13 NOTE — PROGRESS NOTES
Subjective   Raquel Espitia is a 54 y.o. female    Chief Complaint    Diabetes mellitus    History of Present Illness  Patient presents today with diabetes mellitus due for a hemoglobin A1c prior to dental extraction.  She reports that her dentist wanted to see an improvement in her A1c levels before doing a multi tooth dental extraction.  Her A1c today was 9.8% down from previous 11.7%.  She will be provided with a written copy of her results.    The following portions of the patient's history were reviewed and updated as appropriate: allergies, current medications, past social history and problem list    Review of Systems   Constitutional: Negative for appetite change, diaphoresis, fatigue and unexpected weight change.   Eyes: Negative for visual disturbance.   Respiratory: Negative for chest tightness and shortness of breath.    Cardiovascular: Negative for chest pain, palpitations and leg swelling.   Gastrointestinal: Negative for diarrhea, nausea and vomiting.   Endocrine: Negative for polydipsia, polyphagia and polyuria.   Skin: Negative for color change.   Neurological: Negative for dizziness, weakness, light-headedness and numbness.       Objective     There were no vitals filed for this visit.    Physical Exam   Constitutional: She appears well-developed and well-nourished.   Neck: Neck supple. No JVD present. No thyromegaly present.   Cardiovascular: Normal rate, regular rhythm, normal heart sounds and intact distal pulses.   Pulmonary/Chest: Effort normal and breath sounds normal.   Abdominal: Soft. Bowel sounds are normal.   Musculoskeletal: She exhibits no edema.   Lymphadenopathy:     She has no cervical adenopathy.   Neurological: No sensory deficit.   Skin: Skin is warm and dry. She is not diaphoretic.   Nursing note and vitals reviewed.      Assessment/Plan   Problem List Items Addressed This Visit     None      Visit Diagnoses     Type 2 diabetes mellitus without complication, without long-term  current use of insulin (CMS/HCC)    -  Primary        No changes in current therapy, continue to work on diet.

## 2020-03-11 ENCOUNTER — TELEPHONE (OUTPATIENT)
Dept: FAMILY MEDICINE CLINIC | Facility: CLINIC | Age: 55
End: 2020-03-11

## 2020-03-11 RX ORDER — TIZANIDINE 4 MG/1
TABLET ORAL
COMMUNITY
Start: 2020-01-13 | End: 2020-11-12

## 2020-03-11 RX ORDER — BLOOD-GLUCOSE METER
KIT MISCELLANEOUS
COMMUNITY
Start: 2020-02-03 | End: 2020-11-23

## 2020-03-11 RX ORDER — BLOOD-GLUCOSE METER
1 KIT MISCELLANEOUS DAILY
COMMUNITY
Start: 2020-02-03 | End: 2020-03-11 | Stop reason: SDUPTHER

## 2020-03-11 RX ORDER — LANCETS 28 GAUGE
1 EACH MISCELLANEOUS DAILY
Qty: 100 EACH | Refills: 6 | Status: SHIPPED | OUTPATIENT
Start: 2020-03-11 | End: 2020-11-23

## 2020-03-11 RX ORDER — LANCETS 28 GAUGE
1 EACH MISCELLANEOUS DAILY
COMMUNITY
Start: 2020-02-01 | End: 2020-03-11 | Stop reason: SDUPTHER

## 2020-03-11 RX ORDER — BLOOD-GLUCOSE METER
1 KIT MISCELLANEOUS DAILY
Qty: 50 EACH | Refills: 11 | Status: SHIPPED | OUTPATIENT
Start: 2020-03-11 | End: 2020-08-21 | Stop reason: SDUPTHER

## 2020-03-11 NOTE — TELEPHONE ENCOUNTER
PHARMACY STATES THAT IN January DR. BROOKE WROTE A PRESCRIPTION FOR FREESTYLE LITE AND TEST STRIPS AND LANCETS.  PATIENT NEEDS TEST STRIPS AND LANCETS BUT I DO NOT SEE THEM IN THE MED CHART    PATIENT IS OUT OF THEM BOTH TEST STRIPS AND LANCETS      PLEASE CALL PHARMACY   [id number] [place], [place] - [address] AT [facility] OF [facility] [address]  - [phone] [facility] [phone] FX

## 2020-04-28 NOTE — TELEPHONE ENCOUNTER
Pt called and stated the pharmacy told her they requested her metFORMIN (GLUCOPHAGE) 1000 MG tablet two weeks ago and she has still not received anything. Pt is completely out and has been. Please advise    [facility] [address] in [place]

## 2020-07-07 ENCOUNTER — OFFICE VISIT (OUTPATIENT)
Dept: FAMILY MEDICINE CLINIC | Facility: CLINIC | Age: 55
End: 2020-07-07

## 2020-07-07 VITALS
SYSTOLIC BLOOD PRESSURE: 138 MMHG | HEIGHT: 62 IN | HEART RATE: 80 BPM | OXYGEN SATURATION: 92 % | WEIGHT: 163.5 LBS | BODY MASS INDEX: 30.09 KG/M2 | DIASTOLIC BLOOD PRESSURE: 82 MMHG | TEMPERATURE: 97.5 F | RESPIRATION RATE: 20 BRPM

## 2020-07-07 DIAGNOSIS — G89.29 CHRONIC BILATERAL LOW BACK PAIN WITHOUT SCIATICA: Primary | ICD-10-CM

## 2020-07-07 DIAGNOSIS — E11.9 TYPE 2 DIABETES MELLITUS WITHOUT COMPLICATION, WITHOUT LONG-TERM CURRENT USE OF INSULIN (HCC): ICD-10-CM

## 2020-07-07 DIAGNOSIS — I10 BENIGN ESSENTIAL HYPERTENSION: ICD-10-CM

## 2020-07-07 DIAGNOSIS — M54.50 CHRONIC BILATERAL LOW BACK PAIN WITHOUT SCIATICA: Primary | ICD-10-CM

## 2020-07-07 DIAGNOSIS — J44.9 CHRONIC OBSTRUCTIVE PULMONARY DISEASE, UNSPECIFIED COPD TYPE (HCC): ICD-10-CM

## 2020-07-07 DIAGNOSIS — F41.9 ANXIETY: ICD-10-CM

## 2020-07-07 PROCEDURE — 99214 OFFICE O/P EST MOD 30 MIN: CPT | Performed by: FAMILY MEDICINE

## 2020-07-07 RX ORDER — DIAZEPAM 10 MG/1
10 TABLET ORAL EVERY 8 HOURS PRN
Qty: 75 TABLET | Refills: 5 | Status: SHIPPED | OUTPATIENT
Start: 2020-07-07 | End: 2021-01-01 | Stop reason: SDUPTHER

## 2020-07-07 RX ORDER — LEVOFLOXACIN 500 MG/1
500 TABLET, FILM COATED ORAL DAILY
Qty: 10 TABLET | Refills: 0 | Status: CANCELLED | OUTPATIENT
Start: 2020-07-07

## 2020-07-07 RX ORDER — ALBUTEROL SULFATE 90 UG/1
2 AEROSOL, METERED RESPIRATORY (INHALATION) EVERY 4 HOURS PRN
Qty: 6.7 G | Refills: 11 | Status: SHIPPED | OUTPATIENT
Start: 2020-07-07 | End: 2021-01-01 | Stop reason: SDUPTHER

## 2020-07-07 RX ORDER — OMEPRAZOLE 40 MG/1
40 CAPSULE, DELAYED RELEASE ORAL DAILY
Qty: 30 CAPSULE | Refills: 11 | Status: SHIPPED | OUTPATIENT
Start: 2020-07-07 | End: 2021-01-01 | Stop reason: SDUPTHER

## 2020-07-08 NOTE — PROGRESS NOTES
Subjective   Raquel Espitia is a 55 y.o. female    Chief Complaint    Chronic back pain with radicular symptomatology  Type 2 diabetes mellitus  Hypertension  COPD  Anxiety    History of Present Illness  Patient presents today with multiple chronic medical problems including chronic back pain with worsening radicular symptoms, type 2 diabetes mellitus, hypertension, COPD and anxiety.  She is due for multiple medication refills.  She has recently undergone a full mouth extraction and is being fitted for dentures but apparently this is been a very tedious process.  She has actually lost 10 pounds since her last visit here.  All    The following portions of the patient's history were reviewed and updated as appropriate: allergies, current medications, past social history and problem list    Review of Systems   Constitutional: Negative.  Negative for appetite change, diaphoresis, fatigue and unexpected weight change.   Eyes: Negative for visual disturbance.   Respiratory: Negative.  Negative for cough, chest tightness and shortness of breath.    Cardiovascular: Negative for chest pain, palpitations and leg swelling.   Gastrointestinal: Negative.  Negative for diarrhea, nausea and vomiting.   Endocrine: Negative for polydipsia, polyphagia and polyuria.   Musculoskeletal: Positive for back pain. Negative for arthralgias, gait problem and myalgias.   Skin: Negative for color change and rash.   Neurological: Negative for dizziness, tremors, syncope, weakness, light-headedness, numbness and headaches.   Psychiatric/Behavioral: Negative for behavioral problems and dysphoric mood. The patient is not nervous/anxious.        Objective     Vitals:    07/07/20 0818   BP: 138/82   Pulse: 80   Resp: 20   Temp: 97.5 °F (36.4 °C)   SpO2: 92%       Physical Exam   Constitutional: She is oriented to person, place, and time. She appears well-developed and well-nourished.   HENT:   Head: Normocephalic and atraumatic.   Eyes: Pupils are  equal, round, and reactive to light. Conjunctivae are normal.   Neck: Neck supple. No JVD present. No thyromegaly present.   Cardiovascular: Normal rate, regular rhythm, normal heart sounds, intact distal pulses and normal pulses.   No murmur heard.  Pulmonary/Chest: Effort normal and breath sounds normal. No respiratory distress.   Abdominal: Soft. Bowel sounds are normal. There is no hepatosplenomegaly. There is no tenderness.   Musculoskeletal: She exhibits no edema.        Lumbar back: She exhibits decreased range of motion, tenderness, bony tenderness and pain. She exhibits no swelling, no deformity and no spasm.   Lymphadenopathy:     She has no cervical adenopathy.   Neurological: She is alert and oriented to person, place, and time. She has normal reflexes. No sensory deficit.   Skin: Skin is warm and dry. She is not diaphoretic.   Nursing note and vitals reviewed.      Assessment/Plan   Problem List Items Addressed This Visit        Cardiovascular and Mediastinum    Benign essential hypertension    Relevant Medications    verapamil SR (CALAN-SR) 180 MG CR tablet       Nervous and Auditory    Lumbago - Primary    Relevant Medications    albuterol sulfate  (90 Base) MCG/ACT inhaler    Other Relevant Orders    MRI Lumbar Spine Without Contrast       Other    Anxiety    Relevant Medications    diazePAM (VALIUM) 10 MG tablet      Other Visit Diagnoses     Chronic obstructive pulmonary disease, unspecified COPD type (CMS/[facility])        Relevant Medications    albuterol sulfate  (90 Base) MCG/ACT inhaler    Type 2 diabetes mellitus without complication, without long-term current use of insulin (CMS/[facility])        Relevant Medications    Empagliflozin-metFORMIN HCl ER 12.5-1000 MG tablet sustained-release 24 hour

## 2020-07-21 RX ORDER — ISOSORBIDE MONONITRATE 30 MG/1
60 TABLET, EXTENDED RELEASE ORAL DAILY
Qty: 60 TABLET | Refills: 0 | Status: SHIPPED | OUTPATIENT
Start: 2020-07-21 | End: 2020-11-23

## 2020-07-28 ENCOUNTER — APPOINTMENT (OUTPATIENT)
Dept: MRI IMAGING | Facility: HOSPITAL | Age: 55
End: 2020-07-28

## 2020-08-13 ENCOUNTER — HOSPITAL ENCOUNTER (OUTPATIENT)
Dept: MRI IMAGING | Facility: HOSPITAL | Age: 55
Discharge: HOME | End: 2020-08-13
Admitting: FAMILY MEDICINE

## 2020-08-13 DIAGNOSIS — M54.50 CHRONIC BILATERAL LOW BACK PAIN WITHOUT SCIATICA: ICD-10-CM

## 2020-08-13 DIAGNOSIS — G89.29 CHRONIC BILATERAL LOW BACK PAIN WITHOUT SCIATICA: ICD-10-CM

## 2020-08-13 PROCEDURE — 72148 MRI LUMBAR SPINE W/O DYE: CPT

## 2020-08-18 ENCOUNTER — TELEPHONE (OUTPATIENT)
Dept: FAMILY MEDICINE CLINIC | Facility: CLINIC | Age: 55
End: 2020-08-18

## 2020-08-18 NOTE — TELEPHONE ENCOUNTER
Patient is aware and [facility] and she also wanted to know who to see at [facility]?  I sent a message to Dr. Stewart about this to let me know who he wanted her to see

## 2020-08-18 NOTE — TELEPHONE ENCOUNTER
PATIENT REQUESTING CALL BACK REGARDING RESULTS OF MRI FROM 08/13    PATIENT CALL BACK    [phone]

## 2020-08-21 RX ORDER — BLOOD-GLUCOSE METER
1 KIT MISCELLANEOUS DAILY
Qty: 50 EACH | Refills: 11 | Status: SHIPPED | OUTPATIENT
Start: 2020-08-21 | End: 2020-11-23

## 2020-08-28 ENCOUNTER — TELEPHONE (OUTPATIENT)
Dept: FAMILY MEDICINE CLINIC | Facility: CLINIC | Age: 55
End: 2020-08-28

## 2020-11-06 ENCOUNTER — TELEPHONE (OUTPATIENT)
Dept: PEDIATRICS | Facility: OTHER | Age: 55
End: 2020-11-06

## 2020-11-06 NOTE — TELEPHONE ENCOUNTER
PT STATES HER MRI DISC NEEDS TO BE REQUESTED.          DR.JONATHAN ALVAREZ.  [address] [place] [address]  [phone]      PLEASE ADVISE  [phone]

## 2020-11-09 NOTE — TELEPHONE ENCOUNTER
Pt informed we can't get disc; she said she has the disc already and has an appt here in a few days to discuss smoking cessation.

## 2020-11-12 ENCOUNTER — OFFICE VISIT (OUTPATIENT)
Dept: FAMILY MEDICINE CLINIC | Facility: CLINIC | Age: 55
End: 2020-11-12

## 2020-11-12 VITALS
WEIGHT: 167 LBS | HEART RATE: 96 BPM | OXYGEN SATURATION: 99 % | BODY MASS INDEX: 30.73 KG/M2 | SYSTOLIC BLOOD PRESSURE: 152 MMHG | TEMPERATURE: 97.9 F | HEIGHT: 62 IN | DIASTOLIC BLOOD PRESSURE: 80 MMHG

## 2020-11-12 DIAGNOSIS — E78.5 HYPERLIPIDEMIA, UNSPECIFIED HYPERLIPIDEMIA TYPE: ICD-10-CM

## 2020-11-12 DIAGNOSIS — M54.50 CHRONIC BILATERAL LOW BACK PAIN WITHOUT SCIATICA: ICD-10-CM

## 2020-11-12 DIAGNOSIS — Z72.0 TOBACCO ABUSE: ICD-10-CM

## 2020-11-12 DIAGNOSIS — J44.9 CHRONIC OBSTRUCTIVE PULMONARY DISEASE, UNSPECIFIED COPD TYPE (HCC): ICD-10-CM

## 2020-11-12 DIAGNOSIS — I10 BENIGN ESSENTIAL HYPERTENSION: ICD-10-CM

## 2020-11-12 DIAGNOSIS — G89.29 CHRONIC BILATERAL LOW BACK PAIN WITHOUT SCIATICA: ICD-10-CM

## 2020-11-12 DIAGNOSIS — E11.9 TYPE 2 DIABETES MELLITUS WITHOUT COMPLICATION, WITHOUT LONG-TERM CURRENT USE OF INSULIN (HCC): Primary | ICD-10-CM

## 2020-11-12 DIAGNOSIS — Z51.81 MEDICATION MONITORING ENCOUNTER: ICD-10-CM

## 2020-11-12 PROCEDURE — 99214 OFFICE O/P EST MOD 30 MIN: CPT | Performed by: FAMILY MEDICINE

## 2020-11-12 RX ORDER — VARENICLINE TARTRATE 1 MG/1
1 TABLET, FILM COATED ORAL 2 TIMES DAILY
Qty: 60 TABLET | Refills: 11 | Status: SHIPPED | OUTPATIENT
Start: 2020-11-12 | End: 2022-01-01 | Stop reason: HOSPADM

## 2020-11-12 RX ORDER — BACLOFEN 10 MG/1
10 TABLET ORAL 2 TIMES DAILY PRN
Qty: 40 TABLET | Refills: 5 | Status: SHIPPED | OUTPATIENT
Start: 2020-11-12 | End: 2021-01-01

## 2020-11-12 RX ORDER — ATORVASTATIN CALCIUM 40 MG/1
40 TABLET, FILM COATED ORAL NIGHTLY
Qty: 90 TABLET | Refills: 3 | Status: SHIPPED | OUTPATIENT
Start: 2020-11-12 | End: 2020-11-26 | Stop reason: HOSPADM

## 2020-11-17 NOTE — PROGRESS NOTES
Subjective   Raquel Espitia is a 55 y.o. female    Chief Complaint    Diabetes mellitus  Hypertension  Hyperlipidemia  Low back pain  Neck pain  Tobacco abuse  COPD    History of Present Illness  Patient presents today with multiple ongoing medical problems.  Continues to have problems with pain of her neck and lower back.  She does not feel the muscle relaxers previously used are helping any longer.  She also requests help with a Chantix prescription to help quit smoking.  She had quit at one point but has resumed.  She is being considered for surgical procedure but must quit smoking before they will consider further.  Her blood pressure is modestly elevated today at 152/80.  She is fasting today for lab studies.    The following portions of the patient's history were reviewed and updated as appropriate: allergies, current medications, past social history and problem list    Review of Systems   Constitutional: Negative.  Negative for appetite change, chills, diaphoresis, fatigue, fever and unexpected weight change.   Eyes: Negative for visual disturbance.   Respiratory: Positive for cough and shortness of breath. Negative for chest tightness and wheezing.    Cardiovascular: Negative for chest pain, palpitations and leg swelling.   Gastrointestinal: Negative.  Negative for abdominal pain, diarrhea, nausea and vomiting.   Endocrine: Negative for polydipsia, polyphagia and polyuria.   Genitourinary: Negative for dysuria, frequency and urgency.   Musculoskeletal: Positive for arthralgias, back pain, myalgias, neck pain and neck stiffness. Negative for gait problem.   Skin: Negative for color change and rash.   Neurological: Negative for dizziness, tremors, syncope, weakness, light-headedness, numbness and headaches.   Hematological: Negative for adenopathy. Does not bruise/bleed easily.   Psychiatric/Behavioral: Negative for behavioral problems and dysphoric mood. The patient is not nervous/anxious.        Objective      Vitals:    11/12/20 1048   BP: 152/80   Pulse: 96   Temp: 97.9 °F (36.6 °C)   SpO2: 99%       Physical Exam  Vitals signs and nursing note reviewed.   Constitutional:       Appearance: She is well-developed. She is obese.   HENT:      Head: Normocephalic and atraumatic.   Eyes:      General: Scleral icterus present.      Conjunctiva/sclera: Conjunctivae normal.      Pupils: Pupils are equal, round, and reactive to light.   Neck:      Musculoskeletal: Neck rigidity and muscular tenderness present.   Cardiovascular:      Rate and Rhythm: Normal rate and regular rhythm.   Pulmonary:      Effort: Pulmonary effort is normal. No respiratory distress.      Breath sounds: Decreased breath sounds present.   Abdominal:      General: Bowel sounds are normal.      Palpations: Abdomen is soft.      Tenderness: There is no abdominal tenderness.   Musculoskeletal:      Lumbar back: She exhibits decreased range of motion, tenderness, bony tenderness and pain. She exhibits no swelling, no deformity and no spasm.   Skin:     General: Skin is warm and dry.   Neurological:      General: No focal deficit present.      Mental Status: She is alert and oriented to person, place, and time.      Deep Tendon Reflexes: Reflexes are normal and symmetric.   Psychiatric:         Mood and Affect: Mood normal.         Behavior: Behavior normal.         Assessment/Plan   Problems Addressed this Visit        Cardiovascular and Mediastinum    Benign essential hypertension    Relevant Medications    metoprolol tartrate (LOPRESSOR) 25 MG tablet    Other Relevant Orders    Comprehensive Metabolic Panel       Nervous and Auditory    Lumbago    Relevant Medications    baclofen (LIORESAL) 10 MG tablet       Other    Tobacco abuse    Relevant Medications    varenicline (Chantix Continuing Month Jin) 1 MG tablet      Other Visit Diagnoses     Type 2 diabetes mellitus without complication, without long-term current use of insulin (CMS/[facility])    -  Primary     Relevant Orders    Comprehensive Metabolic Panel    Hemoglobin A1c    Hyperlipidemia, unspecified hyperlipidemia type        Relevant Medications    atorvastatin (LIPITOR) 40 MG tablet    Other Relevant Orders    Comprehensive Metabolic Panel    Lipid Panel    Medication monitoring encounter        Relevant Orders    CBC (No Diff)    Comprehensive Metabolic Panel    Chronic obstructive pulmonary disease, unspecified COPD type (CMS/[facility])        Relevant Medications    varenicline (Chantix Continuing Month Jin) 1 MG tablet      Diagnoses       Codes Comments    Type 2 diabetes mellitus without complication, without long-term current use of insulin (CMS/[facility])    -  Primary ICD-10-CM: E11.9  ICD-9-CM: 250.00     Hyperlipidemia, unspecified hyperlipidemia type     ICD-10-CM: E78.5  ICD-9-CM: 272.4     Benign essential hypertension     ICD-10-CM: I10  ICD-9-CM: 401.1     Medication monitoring encounter     ICD-10-CM: Z51.81  ICD-9-CM: V58.83     Chronic bilateral low back pain without sciatica     ICD-10-CM: M54.5, G89.29  ICD-9-CM: 724.2, 338.29     Tobacco abuse     ICD-10-CM: Z72.0  ICD-9-CM: 305.1     Chronic obstructive pulmonary disease, unspecified COPD type (CMS/HCC)     ICD-10-CM: J44.9  ICD-9-CM: 496

## 2020-11-23 ENCOUNTER — APPOINTMENT (OUTPATIENT)
Dept: CT IMAGING | Facility: HOSPITAL | Age: 55
End: 2020-11-23

## 2020-11-23 ENCOUNTER — HOSPITAL ENCOUNTER (INPATIENT)
Facility: HOSPITAL | Age: 55
LOS: 3 days | Discharge: HOME | End: 2020-11-26
Attending: EMERGENCY MEDICINE | Admitting: INTERNAL MEDICINE

## 2020-11-23 ENCOUNTER — APPOINTMENT (OUTPATIENT)
Dept: GENERAL RADIOLOGY | Facility: HOSPITAL | Age: 55
End: 2020-11-23

## 2020-11-23 ENCOUNTER — APPOINTMENT (OUTPATIENT)
Dept: MRI IMAGING | Facility: HOSPITAL | Age: 55
End: 2020-11-23

## 2020-11-23 ENCOUNTER — APPOINTMENT (OUTPATIENT)
Dept: CARDIOLOGY | Facility: HOSPITAL | Age: 55
End: 2020-11-23

## 2020-11-23 DIAGNOSIS — I65.23 BILATERAL CAROTID ARTERY STENOSIS: ICD-10-CM

## 2020-11-23 DIAGNOSIS — I63.9 CEREBROVASCULAR ACCIDENT (CVA), UNSPECIFIED MECHANISM (HCC): ICD-10-CM

## 2020-11-23 DIAGNOSIS — I10 ELEVATED BLOOD PRESSURE READING WITH DIAGNOSIS OF HYPERTENSION: ICD-10-CM

## 2020-11-23 DIAGNOSIS — R09.89 SUSPECTED CEREBROVASCULAR ACCIDENT (CVA): ICD-10-CM

## 2020-11-23 DIAGNOSIS — R53.1 ACUTE RIGHT-SIDED WEAKNESS: Primary | ICD-10-CM

## 2020-11-23 DIAGNOSIS — E66.9 DIABETES MELLITUS TYPE 2 IN OBESE (HCC): ICD-10-CM

## 2020-11-23 DIAGNOSIS — E11.69 DIABETES MELLITUS TYPE 2 IN OBESE (HCC): ICD-10-CM

## 2020-11-23 PROBLEM — Z72.0 TOBACCO ABUSE: Chronic | Status: ACTIVE | Noted: 2017-07-14

## 2020-11-23 PROBLEM — I50.30 (HFPEF) HEART FAILURE WITH PRESERVED EJECTION FRACTION (HCC): Status: ACTIVE | Noted: 2020-11-23

## 2020-11-23 PROBLEM — Z86.73 HISTORY OF CVA (CEREBROVASCULAR ACCIDENT): Status: ACTIVE | Noted: 2020-11-23

## 2020-11-23 PROBLEM — E04.1 THYROID NODULE: Status: ACTIVE | Noted: 2020-11-23

## 2020-11-23 PROBLEM — E78.5 DYSLIPIDEMIA: Chronic | Status: ACTIVE | Noted: 2020-11-23

## 2020-11-23 PROBLEM — F11.90 CHRONIC, CONTINUOUS USE OF OPIOIDS: Status: ACTIVE | Noted: 2020-11-23

## 2020-11-23 PROBLEM — F11.90 CHRONIC, CONTINUOUS USE OF OPIOIDS: Chronic | Status: ACTIVE | Noted: 2020-11-23

## 2020-11-23 PROBLEM — I50.30 (HFPEF) HEART FAILURE WITH PRESERVED EJECTION FRACTION: Chronic | Status: ACTIVE | Noted: 2020-11-23

## 2020-11-23 PROBLEM — E78.5 DYSLIPIDEMIA: Status: ACTIVE | Noted: 2020-11-23

## 2020-11-23 PROBLEM — K21.9 GASTROESOPHAGEAL REFLUX DISEASE: Chronic | Status: ACTIVE | Noted: 2017-07-15

## 2020-11-23 LAB
ALBUMIN SERPL-MCNC: 4.3 G/DL (ref 3.5–5.2)
ALBUMIN/GLOB SERPL: 1.6 G/DL
ALP SERPL-CCNC: 121 U/L (ref 39–117)
ALT SERPL W P-5'-P-CCNC: 14 U/L (ref 1–33)
ALT SERPL W P-5'-P-CCNC: 14 U/L (ref 1–33)
ANION GAP SERPL CALCULATED.3IONS-SCNC: 15 MMOL/L (ref 5–15)
APTT PPP: 31.4 SECONDS (ref 24–37)
AST SERPL-CCNC: 13 U/L (ref 1–32)
AST SERPL-CCNC: 14 U/L (ref 1–32)
BACTERIA UR QL AUTO: NORMAL /HPF
BASE EXCESS BLDA CALC-SCNC: 4 MMOL/L (ref -5–5)
BASOPHILS # BLD AUTO: 0.06 10*3/MM3 (ref 0–0.2)
BASOPHILS # BLD AUTO: 0.07 10*3/MM3 (ref 0–0.2)
BASOPHILS NFR BLD AUTO: 0.7 % (ref 0–1.5)
BASOPHILS NFR BLD AUTO: 0.8 % (ref 0–1.5)
BILIRUB SERPL-MCNC: 0.4 MG/DL (ref 0–1.2)
BILIRUB UR QL STRIP: NEGATIVE
BUN SERPL-MCNC: 20 MG/DL (ref 6–20)
BUN/CREAT SERPL: 32.3 (ref 7–25)
CA-I BLDA-SCNC: 1.25 MMOL/L (ref 1.2–1.32)
CALCIUM SPEC-SCNC: 9.7 MG/DL (ref 8.6–10.5)
CHLORIDE SERPL-SCNC: 101 MMOL/L (ref 98–107)
CLARITY UR: ABNORMAL
CO2 BLDA-SCNC: 30 MMOL/L (ref 24–29)
CO2 SERPL-SCNC: 22 MMOL/L (ref 22–29)
COLOR UR: YELLOW
CREAT SERPL-MCNC: 0.62 MG/DL (ref 0.57–1)
DEPRECATED RDW RBC AUTO: 42.5 FL (ref 37–54)
DEPRECATED RDW RBC AUTO: 44.8 FL (ref 37–54)
EOSINOPHIL # BLD AUTO: 0.15 10*3/MM3 (ref 0–0.4)
EOSINOPHIL # BLD AUTO: 0.16 10*3/MM3 (ref 0–0.4)
EOSINOPHIL NFR BLD AUTO: 1.7 % (ref 0.3–6.2)
EOSINOPHIL NFR BLD AUTO: 1.8 % (ref 0.3–6.2)
ERYTHROCYTE [DISTWIDTH] IN BLOOD BY AUTOMATED COUNT: 13.2 % (ref 12.3–15.4)
ERYTHROCYTE [DISTWIDTH] IN BLOOD BY AUTOMATED COUNT: 13.2 % (ref 12.3–15.4)
GFR SERPL CREATININE-BSD FRML MDRD: 100 ML/MIN/1.73
GLOBULIN UR ELPH-MCNC: 2.7 GM/DL
GLUCOSE BLDC GLUCOMTR-MCNC: 171 MG/DL (ref 70–130)
GLUCOSE BLDC GLUCOMTR-MCNC: 190 MG/DL (ref 70–130)
GLUCOSE BLDC GLUCOMTR-MCNC: 238 MG/DL (ref 70–130)
GLUCOSE BLDC GLUCOMTR-MCNC: 297 MG/DL (ref 70–130)
GLUCOSE SERPL-MCNC: 292 MG/DL (ref 65–99)
GLUCOSE UR STRIP-MCNC: ABNORMAL MG/DL
HBA1C MFR BLD: 8.7 % (ref 4.8–5.6)
HCO3 BLDA-SCNC: 28.9 MMOL/L (ref 22–26)
HCT VFR BLD AUTO: 51.3 % (ref 34–46.6)
HCT VFR BLD AUTO: 51.6 % (ref 34–46.6)
HCT VFR BLDA CALC: 49 % (ref 38–51)
HGB BLD-MCNC: 16.2 G/DL (ref 12–15.9)
HGB BLD-MCNC: 17.2 G/DL (ref 12–15.9)
HGB BLDA-MCNC: 16.7 G/DL (ref 12–17)
HGB UR QL STRIP.AUTO: NEGATIVE
HOLD SPECIMEN: NORMAL
HOLD SPECIMEN: NORMAL
HYALINE CASTS UR QL AUTO: NORMAL /LPF
IMM GRANULOCYTES # BLD AUTO: 0.05 10*3/MM3 (ref 0–0.05)
IMM GRANULOCYTES # BLD AUTO: 0.05 10*3/MM3 (ref 0–0.05)
IMM GRANULOCYTES NFR BLD AUTO: 0.5 % (ref 0–0.5)
IMM GRANULOCYTES NFR BLD AUTO: 0.6 % (ref 0–0.5)
KETONES UR QL STRIP: NEGATIVE
LEUKOCYTE ESTERASE UR QL STRIP.AUTO: NEGATIVE
LYMPHOCYTES # BLD AUTO: 1.26 10*3/MM3 (ref 0.7–3.1)
LYMPHOCYTES # BLD AUTO: 1.73 10*3/MM3 (ref 0.7–3.1)
LYMPHOCYTES NFR BLD AUTO: 13.8 % (ref 19.6–45.3)
LYMPHOCYTES NFR BLD AUTO: 19.4 % (ref 19.6–45.3)
MCH RBC QN AUTO: 29 PG (ref 26.6–33)
MCH RBC QN AUTO: 29.5 PG (ref 26.6–33)
MCHC RBC AUTO-ENTMCNC: 31.6 G/DL (ref 31.5–35.7)
MCHC RBC AUTO-ENTMCNC: 33.3 G/DL (ref 31.5–35.7)
MCV RBC AUTO: 88.4 FL (ref 79–97)
MCV RBC AUTO: 91.9 FL (ref 79–97)
MONOCYTES # BLD AUTO: 0.55 10*3/MM3 (ref 0.1–0.9)
MONOCYTES # BLD AUTO: 0.59 10*3/MM3 (ref 0.1–0.9)
MONOCYTES NFR BLD AUTO: 6 % (ref 5–12)
MONOCYTES NFR BLD AUTO: 6.6 % (ref 5–12)
NEUTROPHILS NFR BLD AUTO: 6.35 10*3/MM3 (ref 1.7–7)
NEUTROPHILS NFR BLD AUTO: 7.01 10*3/MM3 (ref 1.7–7)
NEUTROPHILS NFR BLD AUTO: 71 % (ref 42.7–76)
NEUTROPHILS NFR BLD AUTO: 77.1 % (ref 42.7–76)
NITRITE UR QL STRIP: NEGATIVE
NRBC BLD AUTO-RTO: 0 /100 WBC (ref 0–0.2)
NRBC BLD AUTO-RTO: 0 /100 WBC (ref 0–0.2)
PCO2 BLDA: 50.7 MM HG (ref 35–45)
PH BLDA: 7.36 PH UNITS (ref 7.35–7.6)
PH UR STRIP.AUTO: 6 [PH] (ref 5–8)
PLATELET # BLD AUTO: 216 10*3/MM3 (ref 140–450)
PLATELET # BLD AUTO: 219 10*3/MM3 (ref 140–450)
PMV BLD AUTO: 10.4 FL (ref 6–12)
PMV BLD AUTO: 10.5 FL (ref 6–12)
PO2 BLDA: 22 MMHG (ref 80–105)
POTASSIUM BLDA-SCNC: 4.6 MMOL/L (ref 3.5–4.9)
POTASSIUM SERPL-SCNC: 4.3 MMOL/L (ref 3.5–5.2)
PROT SERPL-MCNC: 7 G/DL (ref 6–8.5)
PROT UR QL STRIP: ABNORMAL
RBC # BLD AUTO: 5.58 10*6/MM3 (ref 3.77–5.28)
RBC # BLD AUTO: 5.84 10*6/MM3 (ref 3.77–5.28)
RBC # UR: NORMAL /HPF
REF LAB TEST METHOD: NORMAL
SAO2 % BLDA: 34 % (ref 95–98)
SARS-COV-2 RDRP RESP QL NAA+PROBE: NOT DETECTED
SODIUM BLD-SCNC: 137 MMOL/L (ref 138–146)
SODIUM SERPL-SCNC: 138 MMOL/L (ref 136–145)
SP GR UR STRIP: 1.03 (ref 1–1.03)
SQUAMOUS #/AREA URNS HPF: NORMAL /HPF
T4 FREE SERPL-MCNC: 1.22 NG/DL (ref 0.93–1.7)
TROPONIN T SERPL-MCNC: <0.01 NG/ML (ref 0–0.03)
TSH SERPL DL<=0.05 MIU/L-ACNC: 0.56 UIU/ML (ref 0.27–4.2)
UFH PPP CHRO-ACNC: 0.1 IU/ML (ref 0.3–0.7)
UFH PPP CHRO-ACNC: 0.1 IU/ML (ref 0.3–0.7)
UROBILINOGEN UR QL STRIP: ABNORMAL
WBC # BLD AUTO: 8.93 10*3/MM3 (ref 3.4–10.8)
WBC # BLD AUTO: 9.1 10*3/MM3 (ref 3.4–10.8)
WBC UR QL AUTO: NORMAL /HPF
WHOLE BLOOD HOLD SPECIMEN: NORMAL
WHOLE BLOOD HOLD SPECIMEN: NORMAL

## 2020-11-23 PROCEDURE — 84443 ASSAY THYROID STIM HORMONE: CPT | Performed by: NURSE PRACTITIONER

## 2020-11-23 PROCEDURE — 83036 HEMOGLOBIN GLYCOSYLATED A1C: CPT | Performed by: NURSE PRACTITIONER

## 2020-11-23 PROCEDURE — 85014 HEMATOCRIT: CPT

## 2020-11-23 PROCEDURE — 99223 1ST HOSP IP/OBS HIGH 75: CPT | Performed by: INTERNAL MEDICINE

## 2020-11-23 PROCEDURE — 84295 ASSAY OF SERUM SODIUM: CPT

## 2020-11-23 PROCEDURE — 70496 CT ANGIOGRAPHY HEAD: CPT

## 2020-11-23 PROCEDURE — 81001 URINALYSIS AUTO W/SCOPE: CPT | Performed by: EMERGENCY MEDICINE

## 2020-11-23 PROCEDURE — 70450 CT HEAD/BRAIN W/O DYE: CPT

## 2020-11-23 PROCEDURE — 70498 CT ANGIOGRAPHY NECK: CPT

## 2020-11-23 PROCEDURE — 71045 X-RAY EXAM CHEST 1 VIEW: CPT

## 2020-11-23 PROCEDURE — 82803 BLOOD GASES ANY COMBINATION: CPT

## 2020-11-23 PROCEDURE — 63710000001 INSULIN LISPRO (HUMAN) PER 5 UNITS: Performed by: NURSE PRACTITIONER

## 2020-11-23 PROCEDURE — 70551 MRI BRAIN STEM W/O DYE: CPT

## 2020-11-23 PROCEDURE — 85520 HEPARIN ASSAY: CPT | Performed by: EMERGENCY MEDICINE

## 2020-11-23 PROCEDURE — 84484 ASSAY OF TROPONIN QUANT: CPT | Performed by: EMERGENCY MEDICINE

## 2020-11-23 PROCEDURE — 84450 TRANSFERASE (AST) (SGOT): CPT | Performed by: EMERGENCY MEDICINE

## 2020-11-23 PROCEDURE — 99285 EMERGENCY DEPT VISIT HI MDM: CPT

## 2020-11-23 PROCEDURE — 93306 TTE W/DOPPLER COMPLETE: CPT

## 2020-11-23 PROCEDURE — 84132 ASSAY OF SERUM POTASSIUM: CPT

## 2020-11-23 PROCEDURE — 80053 COMPREHEN METABOLIC PANEL: CPT | Performed by: NURSE PRACTITIONER

## 2020-11-23 PROCEDURE — 82565 ASSAY OF CREATININE: CPT

## 2020-11-23 PROCEDURE — 0042T HC CT CEREBRAL PERFUSION W/WO CONTRAST: CPT

## 2020-11-23 PROCEDURE — 82330 ASSAY OF CALCIUM: CPT

## 2020-11-23 PROCEDURE — 84439 ASSAY OF FREE THYROXINE: CPT | Performed by: NURSE PRACTITIONER

## 2020-11-23 PROCEDURE — 99223 1ST HOSP IP/OBS HIGH 75: CPT | Performed by: STUDENT IN AN ORGANIZED HEALTH CARE EDUCATION/TRAINING PROGRAM

## 2020-11-23 PROCEDURE — 84460 ALANINE AMINO (ALT) (SGPT): CPT | Performed by: EMERGENCY MEDICINE

## 2020-11-23 PROCEDURE — 82947 ASSAY GLUCOSE BLOOD QUANT: CPT

## 2020-11-23 PROCEDURE — 87635 SARS-COV-2 COVID-19 AMP PRB: CPT | Performed by: EMERGENCY MEDICINE

## 2020-11-23 PROCEDURE — 25010000002 HEPARIN (PORCINE) 25000-0.45 UT/250ML-% SOLUTION: Performed by: EMERGENCY MEDICINE

## 2020-11-23 PROCEDURE — 93306 TTE W/DOPPLER COMPLETE: CPT | Performed by: INTERNAL MEDICINE

## 2020-11-23 PROCEDURE — 0 IOPAMIDOL PER 1 ML: Performed by: EMERGENCY MEDICINE

## 2020-11-23 PROCEDURE — 85730 THROMBOPLASTIN TIME PARTIAL: CPT | Performed by: EMERGENCY MEDICINE

## 2020-11-23 PROCEDURE — 93005 ELECTROCARDIOGRAM TRACING: CPT | Performed by: EMERGENCY MEDICINE

## 2020-11-23 PROCEDURE — 85025 COMPLETE CBC W/AUTO DIFF WBC: CPT | Performed by: EMERGENCY MEDICINE

## 2020-11-23 RX ORDER — CLOPIDOGREL BISULFATE 75 MG/1
600 TABLET ORAL ONCE
Status: COMPLETED | OUTPATIENT
Start: 2020-11-23 | End: 2020-11-23

## 2020-11-23 RX ORDER — NICOTINE 21 MG/24HR
1 PATCH, TRANSDERMAL 24 HOURS TRANSDERMAL EVERY 24 HOURS
Status: DISCONTINUED | OUTPATIENT
Start: 2020-11-23 | End: 2020-11-26 | Stop reason: HOSPADM

## 2020-11-23 RX ORDER — BACLOFEN 10 MG/1
10 TABLET ORAL 2 TIMES DAILY PRN
Status: DISCONTINUED | OUTPATIENT
Start: 2020-11-23 | End: 2020-11-26 | Stop reason: HOSPADM

## 2020-11-23 RX ORDER — FAMOTIDINE 10 MG/ML
20 INJECTION, SOLUTION INTRAVENOUS EVERY 12 HOURS SCHEDULED
Status: DISCONTINUED | OUTPATIENT
Start: 2020-11-23 | End: 2020-11-23

## 2020-11-23 RX ORDER — SODIUM CHLORIDE 0.9 % (FLUSH) 0.9 %
10 SYRINGE (ML) INJECTION AS NEEDED
Status: DISCONTINUED | OUTPATIENT
Start: 2020-11-23 | End: 2020-11-24

## 2020-11-23 RX ORDER — HYDROCODONE BITARTRATE AND ACETAMINOPHEN 10; 325 MG/1; MG/1
1 TABLET ORAL 4 TIMES DAILY PRN
Status: DISCONTINUED | OUTPATIENT
Start: 2020-11-23 | End: 2020-11-26 | Stop reason: HOSPADM

## 2020-11-23 RX ORDER — GABAPENTIN 400 MG/1
400 CAPSULE ORAL 3 TIMES DAILY
Status: DISCONTINUED | OUTPATIENT
Start: 2020-11-23 | End: 2020-11-26 | Stop reason: HOSPADM

## 2020-11-23 RX ORDER — HEPARIN SODIUM 1000 [USP'U]/ML
6000 INJECTION, SOLUTION INTRAVENOUS; SUBCUTANEOUS ONCE
Status: DISCONTINUED | OUTPATIENT
Start: 2020-11-23 | End: 2020-11-23

## 2020-11-23 RX ORDER — NICOTINE POLACRILEX 4 MG
15 LOZENGE BUCCAL
Status: DISCONTINUED | OUTPATIENT
Start: 2020-11-23 | End: 2020-11-26 | Stop reason: HOSPADM

## 2020-11-23 RX ORDER — HEPARIN SODIUM 10000 [USP'U]/100ML
16 INJECTION, SOLUTION INTRAVENOUS
Status: DISCONTINUED | OUTPATIENT
Start: 2020-11-23 | End: 2020-11-24

## 2020-11-23 RX ORDER — PANTOPRAZOLE SODIUM 40 MG/1
40 TABLET, DELAYED RELEASE ORAL EVERY MORNING
Status: DISCONTINUED | OUTPATIENT
Start: 2020-11-24 | End: 2020-11-26 | Stop reason: HOSPADM

## 2020-11-23 RX ORDER — SODIUM CHLORIDE 0.9 % (FLUSH) 0.9 %
10 SYRINGE (ML) INJECTION EVERY 12 HOURS SCHEDULED
Status: DISCONTINUED | OUTPATIENT
Start: 2020-11-23 | End: 2020-11-26 | Stop reason: HOSPADM

## 2020-11-23 RX ORDER — ACETAMINOPHEN 325 MG/1
650 TABLET ORAL EVERY 6 HOURS PRN
Status: DISCONTINUED | OUTPATIENT
Start: 2020-11-23 | End: 2020-11-26 | Stop reason: HOSPADM

## 2020-11-23 RX ORDER — ATORVASTATIN CALCIUM 40 MG/1
80 TABLET, FILM COATED ORAL NIGHTLY
Status: DISCONTINUED | OUTPATIENT
Start: 2020-11-23 | End: 2020-11-26 | Stop reason: HOSPADM

## 2020-11-23 RX ORDER — DIAZEPAM 5 MG/1
10 TABLET ORAL EVERY 8 HOURS PRN
Status: DISCONTINUED | OUTPATIENT
Start: 2020-11-23 | End: 2020-11-26 | Stop reason: HOSPADM

## 2020-11-23 RX ORDER — SODIUM CHLORIDE 0.9 % (FLUSH) 0.9 %
10 SYRINGE (ML) INJECTION AS NEEDED
Status: DISCONTINUED | OUTPATIENT
Start: 2020-11-23 | End: 2020-11-26 | Stop reason: HOSPADM

## 2020-11-23 RX ORDER — DEXTROSE MONOHYDRATE 25 G/50ML
25 INJECTION, SOLUTION INTRAVENOUS
Status: DISCONTINUED | OUTPATIENT
Start: 2020-11-23 | End: 2020-11-26 | Stop reason: HOSPADM

## 2020-11-23 RX ORDER — HEPARIN SODIUM 1000 [USP'U]/ML
80 INJECTION, SOLUTION INTRAVENOUS; SUBCUTANEOUS AS NEEDED
Status: DISCONTINUED | OUTPATIENT
Start: 2020-11-23 | End: 2020-11-23

## 2020-11-23 RX ORDER — HEPARIN SODIUM 1000 [USP'U]/ML
40 INJECTION, SOLUTION INTRAVENOUS; SUBCUTANEOUS AS NEEDED
Status: DISCONTINUED | OUTPATIENT
Start: 2020-11-23 | End: 2020-11-23

## 2020-11-23 RX ADMIN — HYDROCODONE BITARTRATE AND ACETAMINOPHEN 1 TABLET: 10; 325 TABLET ORAL at 16:18

## 2020-11-23 RX ADMIN — HEPARIN SODIUM 12 UNITS/KG/HR: 10000 INJECTION, SOLUTION INTRAVENOUS at 09:36

## 2020-11-23 RX ADMIN — IOPAMIDOL 100 ML: 755 INJECTION, SOLUTION INTRAVENOUS at 08:38

## 2020-11-23 RX ADMIN — Medication 1 PATCH: at 11:55

## 2020-11-23 RX ADMIN — ACETAMINOPHEN 650 MG: 325 TABLET, FILM COATED ORAL at 12:30

## 2020-11-23 RX ADMIN — CLOPIDOGREL BISULFATE 600 MG: 75 TABLET ORAL at 09:06

## 2020-11-23 RX ADMIN — INSULIN LISPRO 2 UNITS: 100 INJECTION, SOLUTION INTRAVENOUS; SUBCUTANEOUS at 17:33

## 2020-11-23 RX ADMIN — INSULIN LISPRO 2 UNITS: 100 INJECTION, SOLUTION INTRAVENOUS; SUBCUTANEOUS at 20:57

## 2020-11-23 RX ADMIN — SODIUM CHLORIDE, PRESERVATIVE FREE 10 ML: 5 INJECTION INTRAVENOUS at 20:54

## 2020-11-23 RX ADMIN — GABAPENTIN 400 MG: 400 CAPSULE ORAL at 16:18

## 2020-11-23 RX ADMIN — GABAPENTIN 400 MG: 400 CAPSULE ORAL at 20:54

## 2020-11-23 RX ADMIN — ATORVASTATIN CALCIUM 80 MG: 40 TABLET, FILM COATED ORAL at 20:54

## 2020-11-24 ENCOUNTER — APPOINTMENT (OUTPATIENT)
Dept: GENERAL RADIOLOGY | Facility: HOSPITAL | Age: 55
End: 2020-11-24

## 2020-11-24 LAB
ALBUMIN SERPL-MCNC: 3.8 G/DL (ref 3.5–5.2)
ALBUMIN/GLOB SERPL: 1.5 G/DL
ALP SERPL-CCNC: 113 U/L (ref 39–117)
ALT SERPL W P-5'-P-CCNC: 12 U/L (ref 1–33)
ANION GAP SERPL CALCULATED.3IONS-SCNC: 12 MMOL/L (ref 5–15)
AST SERPL-CCNC: 13 U/L (ref 1–32)
BASOPHILS # BLD AUTO: 0.06 10*3/MM3 (ref 0–0.2)
BASOPHILS NFR BLD AUTO: 0.7 % (ref 0–1.5)
BH CV ECHO MEAS - AO MAX PG (FULL): 6.4 MMHG
BH CV ECHO MEAS - AO MAX PG: 8.8 MMHG
BH CV ECHO MEAS - AO MEAN PG (FULL): 3.3 MMHG
BH CV ECHO MEAS - AO MEAN PG: 4.4 MMHG
BH CV ECHO MEAS - AO ROOT AREA (BSA CORRECTED): 1.7
BH CV ECHO MEAS - AO ROOT AREA: 7 CM^2
BH CV ECHO MEAS - AO ROOT DIAM: 3 CM
BH CV ECHO MEAS - AO V2 MAX: 148 CM/SEC
BH CV ECHO MEAS - AO V2 MEAN: 96.9 CM/SEC
BH CV ECHO MEAS - AO V2 VTI: 26.4 CM
BH CV ECHO MEAS - AVA(I,A): 1.3 CM^2
BH CV ECHO MEAS - AVA(I,D): 1.3 CM^2
BH CV ECHO MEAS - AVA(V,A): 1.2 CM^2
BH CV ECHO MEAS - AVA(V,D): 1.2 CM^2
BH CV ECHO MEAS - BSA(HAYCOCK): 1.8 M^2
BH CV ECHO MEAS - BSA: 1.8 M^2
BH CV ECHO MEAS - BZI_BMI: 30.5 KILOGRAMS/M^2
BH CV ECHO MEAS - BZI_METRIC_HEIGHT: 157.5 CM
BH CV ECHO MEAS - BZI_METRIC_WEIGHT: 75.8 KG
BH CV ECHO MEAS - EDV(CUBED): 77.8 ML
BH CV ECHO MEAS - EDV(MOD-SP2): 41 ML
BH CV ECHO MEAS - EDV(MOD-SP4): 38 ML
BH CV ECHO MEAS - EDV(TEICH): 81.7 ML
BH CV ECHO MEAS - EF(CUBED): 70.9 %
BH CV ECHO MEAS - EF(MOD-BP): 59 %
BH CV ECHO MEAS - EF(MOD-SP2): 48.8 %
BH CV ECHO MEAS - EF(MOD-SP4): 63.2 %
BH CV ECHO MEAS - EF(TEICH): 62.9 %
BH CV ECHO MEAS - ESV(CUBED): 22.6 ML
BH CV ECHO MEAS - ESV(MOD-SP2): 21 ML
BH CV ECHO MEAS - ESV(MOD-SP4): 14 ML
BH CV ECHO MEAS - ESV(TEICH): 30.3 ML
BH CV ECHO MEAS - FS: 33.8 %
BH CV ECHO MEAS - IVS/LVPW: 1
BH CV ECHO MEAS - IVSD: 0.84 CM
BH CV ECHO MEAS - LA DIMENSION: 1.8 CM
BH CV ECHO MEAS - LA/AO: 0.59
BH CV ECHO MEAS - LAD MAJOR: 3.4 CM
BH CV ECHO MEAS - LAT PEAK E' VEL: 4.8 CM/SEC
BH CV ECHO MEAS - LATERAL E/E' RATIO: 13.5
BH CV ECHO MEAS - LV DIASTOLIC VOL/BSA (35-75): 21.5 ML/M^2
BH CV ECHO MEAS - LV MASS(C)D: 110.6 GRAMS
BH CV ECHO MEAS - LV MASS(C)DI: 62.4 GRAMS/M^2
BH CV ECHO MEAS - LV MAX PG: 2.4 MMHG
BH CV ECHO MEAS - LV MEAN PG: 1.1 MMHG
BH CV ECHO MEAS - LV SYSTOLIC VOL/BSA (12-30): 7.9 ML/M^2
BH CV ECHO MEAS - LV V1 MAX: 76.7 CM/SEC
BH CV ECHO MEAS - LV V1 MEAN: 48.5 CM/SEC
BH CV ECHO MEAS - LV V1 VTI: 14.8 CM
BH CV ECHO MEAS - LVIDD: 4.3 CM
BH CV ECHO MEAS - LVIDS: 2.8 CM
BH CV ECHO MEAS - LVLD AP2: 6.2 CM
BH CV ECHO MEAS - LVLD AP4: 6.6 CM
BH CV ECHO MEAS - LVLS AP2: 5.1 CM
BH CV ECHO MEAS - LVLS AP4: 5 CM
BH CV ECHO MEAS - LVOT AREA (M): 2.5 CM^2
BH CV ECHO MEAS - LVOT AREA: 2.4 CM^2
BH CV ECHO MEAS - LVOT DIAM: 1.7 CM
BH CV ECHO MEAS - LVPWD: 0.83 CM
BH CV ECHO MEAS - MED PEAK E' VEL: 5.7 CM/SEC
BH CV ECHO MEAS - MEDIAL E/E' RATIO: 11.7
BH CV ECHO MEAS - MV A MAX VEL: 78.8 CM/SEC
BH CV ECHO MEAS - MV DEC TIME: 0.29 SEC
BH CV ECHO MEAS - MV E MAX VEL: 68.4 CM/SEC
BH CV ECHO MEAS - MV E/A: 0.87
BH CV ECHO MEAS - PA ACC SLOPE: 991 CM/SEC^2
BH CV ECHO MEAS - PA ACC TIME: 0.1 SEC
BH CV ECHO MEAS - PA PR(ACCEL): 36.2 MMHG
BH CV ECHO MEAS - PULM DIAS VEL: 37 CM/SEC
BH CV ECHO MEAS - PULM S/D: 1.6
BH CV ECHO MEAS - PULM SYS VEL: 60.2 CM/SEC
BH CV ECHO MEAS - SI(AO): 104.1 ML/M^2
BH CV ECHO MEAS - SI(CUBED): 31.2 ML/M^2
BH CV ECHO MEAS - SI(LVOT): 19.9 ML/M^2
BH CV ECHO MEAS - SI(MOD-SP2): 11.3 ML/M^2
BH CV ECHO MEAS - SI(MOD-SP4): 13.6 ML/M^2
BH CV ECHO MEAS - SI(TEICH): 29 ML/M^2
BH CV ECHO MEAS - SV(AO): 184.3 ML
BH CV ECHO MEAS - SV(CUBED): 55.2 ML
BH CV ECHO MEAS - SV(LVOT): 35.3 ML
BH CV ECHO MEAS - SV(MOD-SP2): 20 ML
BH CV ECHO MEAS - SV(MOD-SP4): 24 ML
BH CV ECHO MEAS - SV(TEICH): 51.4 ML
BH CV ECHO MEAS - TAPSE (>1.6): 2 CM
BH CV ECHO MEASUREMENTS AVERAGE E/E' RATIO: 13.03
BH CV VAS BP LEFT ARM: NORMAL MMHG
BH CV XLRA - RV BASE: 3.2 CM
BH CV XLRA - RV LENGTH: 6.4 CM
BH CV XLRA - RV MID: 2.7 CM
BH CV XLRA - TDI S': 10.3 CM/SEC
BILIRUB SERPL-MCNC: 0.6 MG/DL (ref 0–1.2)
BUN SERPL-MCNC: 19 MG/DL (ref 6–20)
BUN/CREAT SERPL: 32.8 (ref 7–25)
CALCIUM SPEC-SCNC: 9.3 MG/DL (ref 8.6–10.5)
CHLORIDE SERPL-SCNC: 103 MMOL/L (ref 98–107)
CHOLEST SERPL-MCNC: 209 MG/DL (ref 0–200)
CO2 SERPL-SCNC: 24 MMOL/L (ref 22–29)
CREAT SERPL-MCNC: 0.58 MG/DL (ref 0.57–1)
DEPRECATED RDW RBC AUTO: 42.9 FL (ref 37–54)
EOSINOPHIL # BLD AUTO: 0.2 10*3/MM3 (ref 0–0.4)
EOSINOPHIL NFR BLD AUTO: 2.5 % (ref 0.3–6.2)
ERYTHROCYTE [DISTWIDTH] IN BLOOD BY AUTOMATED COUNT: 13.3 % (ref 12.3–15.4)
GFR SERPL CREATININE-BSD FRML MDRD: 108 ML/MIN/1.73
GLOBULIN UR ELPH-MCNC: 2.6 GM/DL
GLUCOSE BLDC GLUCOMTR-MCNC: 198 MG/DL (ref 70–130)
GLUCOSE BLDC GLUCOMTR-MCNC: 220 MG/DL (ref 70–130)
GLUCOSE BLDC GLUCOMTR-MCNC: 239 MG/DL (ref 70–130)
GLUCOSE BLDC GLUCOMTR-MCNC: 251 MG/DL (ref 70–130)
GLUCOSE SERPL-MCNC: 177 MG/DL (ref 65–99)
HCT VFR BLD AUTO: 48.5 % (ref 34–46.6)
HDLC SERPL-MCNC: 36 MG/DL (ref 40–60)
HGB BLD-MCNC: 15.7 G/DL (ref 12–15.9)
IMM GRANULOCYTES # BLD AUTO: 0.05 10*3/MM3 (ref 0–0.05)
IMM GRANULOCYTES NFR BLD AUTO: 0.6 % (ref 0–0.5)
LDLC SERPL CALC-MCNC: 113 MG/DL (ref 0–100)
LDLC/HDLC SERPL: 2.9 {RATIO}
LYMPHOCYTES # BLD AUTO: 1.87 10*3/MM3 (ref 0.7–3.1)
LYMPHOCYTES NFR BLD AUTO: 23 % (ref 19.6–45.3)
MAGNESIUM SERPL-MCNC: 1.8 MG/DL (ref 1.6–2.6)
MCH RBC QN AUTO: 28.4 PG (ref 26.6–33)
MCHC RBC AUTO-ENTMCNC: 32.4 G/DL (ref 31.5–35.7)
MCV RBC AUTO: 87.7 FL (ref 79–97)
MONOCYTES # BLD AUTO: 0.54 10*3/MM3 (ref 0.1–0.9)
MONOCYTES NFR BLD AUTO: 6.6 % (ref 5–12)
NEUTROPHILS NFR BLD AUTO: 5.42 10*3/MM3 (ref 1.7–7)
NEUTROPHILS NFR BLD AUTO: 66.6 % (ref 42.7–76)
NRBC BLD AUTO-RTO: 0 /100 WBC (ref 0–0.2)
PHOSPHATE SERPL-MCNC: 3.9 MG/DL (ref 2.5–4.5)
PLATELET # BLD AUTO: 203 10*3/MM3 (ref 140–450)
PMV BLD AUTO: 10.4 FL (ref 6–12)
POTASSIUM SERPL-SCNC: 3.9 MMOL/L (ref 3.5–5.2)
PROT SERPL-MCNC: 6.4 G/DL (ref 6–8.5)
RBC # BLD AUTO: 5.53 10*6/MM3 (ref 3.77–5.28)
SODIUM SERPL-SCNC: 139 MMOL/L (ref 136–145)
TRIGL SERPL-MCNC: 343 MG/DL (ref 0–150)
UFH PPP CHRO-ACNC: 0.42 IU/ML (ref 0.3–0.7)
UFH PPP CHRO-ACNC: 0.44 IU/ML (ref 0.3–0.7)
VLDLC SERPL-MCNC: 60 MG/DL (ref 5–40)
WBC # BLD AUTO: 8.14 10*3/MM3 (ref 3.4–10.8)

## 2020-11-24 PROCEDURE — 99233 SBSQ HOSP IP/OBS HIGH 50: CPT | Performed by: NURSE PRACTITIONER

## 2020-11-24 PROCEDURE — 99253 IP/OBS CNSLTJ NEW/EST LOW 45: CPT | Performed by: NEUROLOGICAL SURGERY

## 2020-11-24 PROCEDURE — 85520 HEPARIN ASSAY: CPT | Performed by: EMERGENCY MEDICINE

## 2020-11-24 PROCEDURE — 82962 GLUCOSE BLOOD TEST: CPT

## 2020-11-24 PROCEDURE — 80053 COMPREHEN METABOLIC PANEL: CPT | Performed by: INTERNAL MEDICINE

## 2020-11-24 PROCEDURE — 80061 LIPID PANEL: CPT | Performed by: NURSE PRACTITIONER

## 2020-11-24 PROCEDURE — 97162 PT EVAL MOD COMPLEX 30 MIN: CPT

## 2020-11-24 PROCEDURE — 83735 ASSAY OF MAGNESIUM: CPT | Performed by: INTERNAL MEDICINE

## 2020-11-24 PROCEDURE — 85520 HEPARIN ASSAY: CPT

## 2020-11-24 PROCEDURE — 92523 SPEECH SOUND LANG COMPREHEN: CPT

## 2020-11-24 PROCEDURE — 99232 SBSQ HOSP IP/OBS MODERATE 35: CPT | Performed by: INTERNAL MEDICINE

## 2020-11-24 PROCEDURE — 94640 AIRWAY INHALATION TREATMENT: CPT

## 2020-11-24 PROCEDURE — 97165 OT EVAL LOW COMPLEX 30 MIN: CPT

## 2020-11-24 PROCEDURE — 63710000001 INSULIN LISPRO (HUMAN) PER 5 UNITS: Performed by: NURSE PRACTITIONER

## 2020-11-24 PROCEDURE — 71045 X-RAY EXAM CHEST 1 VIEW: CPT

## 2020-11-24 PROCEDURE — 25010000002 HEPARIN (PORCINE) 25000-0.45 UT/250ML-% SOLUTION: Performed by: EMERGENCY MEDICINE

## 2020-11-24 PROCEDURE — 85025 COMPLETE CBC W/AUTO DIFF WBC: CPT | Performed by: INTERNAL MEDICINE

## 2020-11-24 PROCEDURE — 84100 ASSAY OF PHOSPHORUS: CPT | Performed by: INTERNAL MEDICINE

## 2020-11-24 RX ORDER — IPRATROPIUM BROMIDE AND ALBUTEROL SULFATE 2.5; .5 MG/3ML; MG/3ML
3 SOLUTION RESPIRATORY (INHALATION) EVERY 4 HOURS PRN
Status: DISCONTINUED | OUTPATIENT
Start: 2020-11-24 | End: 2020-11-26 | Stop reason: HOSPADM

## 2020-11-24 RX ORDER — HEPARIN SODIUM 10000 [USP'U]/100ML
16 INJECTION, SOLUTION INTRAVENOUS
Status: DISCONTINUED | OUTPATIENT
Start: 2020-11-24 | End: 2020-11-25

## 2020-11-24 RX ORDER — CLOPIDOGREL BISULFATE 75 MG/1
75 TABLET ORAL DAILY
Status: DISCONTINUED | OUTPATIENT
Start: 2020-11-24 | End: 2020-11-26 | Stop reason: HOSPADM

## 2020-11-24 RX ORDER — SODIUM CHLORIDE 9 MG/ML
75 INJECTION, SOLUTION INTRAVENOUS CONTINUOUS
Status: DISCONTINUED | OUTPATIENT
Start: 2020-11-24 | End: 2020-11-25

## 2020-11-24 RX ADMIN — GABAPENTIN 400 MG: 400 CAPSULE ORAL at 16:16

## 2020-11-24 RX ADMIN — INSULIN LISPRO 3 UNITS: 100 INJECTION, SOLUTION INTRAVENOUS; SUBCUTANEOUS at 12:46

## 2020-11-24 RX ADMIN — ATORVASTATIN CALCIUM 80 MG: 40 TABLET, FILM COATED ORAL at 20:48

## 2020-11-24 RX ADMIN — SODIUM CHLORIDE, PRESERVATIVE FREE 10 ML: 5 INJECTION INTRAVENOUS at 20:49

## 2020-11-24 RX ADMIN — HYDROCODONE BITARTRATE AND ACETAMINOPHEN 1 TABLET: 10; 325 TABLET ORAL at 10:54

## 2020-11-24 RX ADMIN — PANTOPRAZOLE SODIUM 40 MG: 40 TABLET, DELAYED RELEASE ORAL at 06:33

## 2020-11-24 RX ADMIN — HEPARIN SODIUM 16 UNITS/KG/HR: 10000 INJECTION, SOLUTION INTRAVENOUS at 06:33

## 2020-11-24 RX ADMIN — Medication 1 PATCH: at 10:55

## 2020-11-24 RX ADMIN — INSULIN LISPRO 3 UNITS: 100 INJECTION, SOLUTION INTRAVENOUS; SUBCUTANEOUS at 21:50

## 2020-11-24 RX ADMIN — INSULIN LISPRO 4 UNITS: 100 INJECTION, SOLUTION INTRAVENOUS; SUBCUTANEOUS at 17:09

## 2020-11-24 RX ADMIN — GABAPENTIN 400 MG: 400 CAPSULE ORAL at 20:49

## 2020-11-24 RX ADMIN — GABAPENTIN 400 MG: 400 CAPSULE ORAL at 08:22

## 2020-11-24 RX ADMIN — SODIUM CHLORIDE 75 ML/HR: 9 INJECTION, SOLUTION INTRAVENOUS at 12:56

## 2020-11-24 RX ADMIN — IPRATROPIUM BROMIDE AND ALBUTEROL SULFATE 3 ML: 2.5; .5 SOLUTION RESPIRATORY (INHALATION) at 04:42

## 2020-11-24 RX ADMIN — CLOPIDOGREL BISULFATE 75 MG: 75 TABLET ORAL at 12:57

## 2020-11-24 RX ADMIN — INSULIN LISPRO 2 UNITS: 100 INJECTION, SOLUTION INTRAVENOUS; SUBCUTANEOUS at 08:22

## 2020-11-25 LAB
ACT BLD: 285 SECONDS (ref 82–152)
ALBUMIN SERPL-MCNC: 3.4 G/DL (ref 3.5–5.2)
ALBUMIN/GLOB SERPL: 1.5 G/DL
ALP SERPL-CCNC: 103 U/L (ref 39–117)
ALT SERPL W P-5'-P-CCNC: 10 U/L (ref 1–33)
ANION GAP SERPL CALCULATED.3IONS-SCNC: 12 MMOL/L (ref 5–15)
AST SERPL-CCNC: 9 U/L (ref 1–32)
BASOPHILS # BLD AUTO: 0.05 10*3/MM3 (ref 0–0.2)
BASOPHILS NFR BLD AUTO: 0.7 % (ref 0–1.5)
BILIRUB SERPL-MCNC: 0.3 MG/DL (ref 0–1.2)
BUN SERPL-MCNC: 18 MG/DL (ref 6–20)
BUN/CREAT SERPL: 25 (ref 7–25)
CALCIUM SPEC-SCNC: 8.6 MG/DL (ref 8.6–10.5)
CHLORIDE SERPL-SCNC: 107 MMOL/L (ref 98–107)
CO2 SERPL-SCNC: 23 MMOL/L (ref 22–29)
CREAT SERPL-MCNC: 0.72 MG/DL (ref 0.57–1)
DEPRECATED RDW RBC AUTO: 43 FL (ref 37–54)
EOSINOPHIL # BLD AUTO: 0.11 10*3/MM3 (ref 0–0.4)
EOSINOPHIL NFR BLD AUTO: 1.6 % (ref 0.3–6.2)
ERYTHROCYTE [DISTWIDTH] IN BLOOD BY AUTOMATED COUNT: 13.3 % (ref 12.3–15.4)
GFR SERPL CREATININE-BSD FRML MDRD: 84 ML/MIN/1.73
GLOBULIN UR ELPH-MCNC: 2.3 GM/DL
GLUCOSE BLDC GLUCOMTR-MCNC: 170 MG/DL (ref 70–130)
GLUCOSE BLDC GLUCOMTR-MCNC: 198 MG/DL (ref 70–130)
GLUCOSE BLDC GLUCOMTR-MCNC: 221 MG/DL (ref 70–130)
GLUCOSE BLDC GLUCOMTR-MCNC: 237 MG/DL (ref 70–130)
GLUCOSE SERPL-MCNC: 214 MG/DL (ref 65–99)
HCT VFR BLD AUTO: 44.1 % (ref 34–46.6)
HGB BLD-MCNC: 14.3 G/DL (ref 12–15.9)
IMM GRANULOCYTES # BLD AUTO: 0.02 10*3/MM3 (ref 0–0.05)
IMM GRANULOCYTES NFR BLD AUTO: 0.3 % (ref 0–0.5)
LYMPHOCYTES # BLD AUTO: 1.99 10*3/MM3 (ref 0.7–3.1)
LYMPHOCYTES NFR BLD AUTO: 28.3 % (ref 19.6–45.3)
MAGNESIUM SERPL-MCNC: 1.8 MG/DL (ref 1.6–2.6)
MCH RBC QN AUTO: 28.5 PG (ref 26.6–33)
MCHC RBC AUTO-ENTMCNC: 32.4 G/DL (ref 31.5–35.7)
MCV RBC AUTO: 88 FL (ref 79–97)
MONOCYTES # BLD AUTO: 0.51 10*3/MM3 (ref 0.1–0.9)
MONOCYTES NFR BLD AUTO: 7.3 % (ref 5–12)
NEUTROPHILS NFR BLD AUTO: 4.35 10*3/MM3 (ref 1.7–7)
NEUTROPHILS NFR BLD AUTO: 61.8 % (ref 42.7–76)
NRBC BLD AUTO-RTO: 0 /100 WBC (ref 0–0.2)
PHOSPHATE SERPL-MCNC: 2.7 MG/DL (ref 2.5–4.5)
PLATELET # BLD AUTO: 219 10*3/MM3 (ref 140–450)
PMV BLD AUTO: 10.9 FL (ref 6–12)
POTASSIUM SERPL-SCNC: 3.7 MMOL/L (ref 3.5–5.2)
PROT SERPL-MCNC: 5.7 G/DL (ref 6–8.5)
RBC # BLD AUTO: 5.01 10*6/MM3 (ref 3.77–5.28)
SODIUM SERPL-SCNC: 142 MMOL/L (ref 136–145)
UFH PPP CHRO-ACNC: 0.11 IU/ML (ref 0.3–0.7)
WBC # BLD AUTO: 7.03 10*3/MM3 (ref 3.4–10.8)

## 2020-11-25 PROCEDURE — 83735 ASSAY OF MAGNESIUM: CPT | Performed by: INTERNAL MEDICINE

## 2020-11-25 PROCEDURE — C1894 INTRO/SHEATH, NON-LASER: HCPCS | Performed by: NEUROLOGICAL SURGERY

## 2020-11-25 PROCEDURE — 84100 ASSAY OF PHOSPHORUS: CPT | Performed by: INTERNAL MEDICINE

## 2020-11-25 PROCEDURE — 36223 PLACE CATH CAROTID/INOM ART: CPT | Performed by: NEUROLOGICAL SURGERY

## 2020-11-25 PROCEDURE — 82962 GLUCOSE BLOOD TEST: CPT

## 2020-11-25 PROCEDURE — 99231 SBSQ HOSP IP/OBS SF/LOW 25: CPT | Performed by: NURSE PRACTITIONER

## 2020-11-25 PROCEDURE — 63710000001 INSULIN LISPRO (HUMAN) PER 5 UNITS: Performed by: NURSE PRACTITIONER

## 2020-11-25 PROCEDURE — 99232 SBSQ HOSP IP/OBS MODERATE 35: CPT | Performed by: INTERNAL MEDICINE

## 2020-11-25 PROCEDURE — C1760 CLOSURE DEV, VASC: HCPCS | Performed by: NEUROLOGICAL SURGERY

## 2020-11-25 PROCEDURE — 85025 COMPLETE CBC W/AUTO DIFF WBC: CPT | Performed by: INTERNAL MEDICINE

## 2020-11-25 PROCEDURE — B3151ZZ FLUOROSCOPY OF BILATERAL COMMON CAROTID ARTERIES USING LOW OSMOLAR CONTRAST: ICD-10-PCS | Performed by: NEUROLOGICAL SURGERY

## 2020-11-25 PROCEDURE — C1769 GUIDE WIRE: HCPCS | Performed by: NEUROLOGICAL SURGERY

## 2020-11-25 PROCEDURE — 80053 COMPREHEN METABOLIC PANEL: CPT | Performed by: INTERNAL MEDICINE

## 2020-11-25 PROCEDURE — 25010000002 HEPARIN (PORCINE) PER 1000 UNITS: Performed by: NEUROLOGICAL SURGERY

## 2020-11-25 PROCEDURE — 37215 TRANSCATH STENT CCA W/EPS: CPT | Performed by: NEUROLOGICAL SURGERY

## 2020-11-25 PROCEDURE — C1876 STENT, NON-COA/NON-COV W/DEL: HCPCS | Performed by: NEUROLOGICAL SURGERY

## 2020-11-25 PROCEDURE — 85520 HEPARIN ASSAY: CPT

## 2020-11-25 PROCEDURE — 85347 COAGULATION TIME ACTIVATED: CPT

## 2020-11-25 PROCEDURE — 037J3DZ DILATION OF LEFT COMMON CAROTID ARTERY WITH INTRALUMINAL DEVICE, PERCUTANEOUS APPROACH: ICD-10-PCS | Performed by: NEUROLOGICAL SURGERY

## 2020-11-25 PROCEDURE — 0 IODIXANOL PER 1 ML: Performed by: NEUROLOGICAL SURGERY

## 2020-11-25 PROCEDURE — 99024 POSTOP FOLLOW-UP VISIT: CPT | Performed by: NEUROLOGICAL SURGERY

## 2020-11-25 PROCEDURE — C1884 EMBOLIZATION PROTECT SYST: HCPCS | Performed by: NEUROLOGICAL SURGERY

## 2020-11-25 PROCEDURE — 75710 ARTERY X-RAYS ARM/LEG: CPT | Performed by: NEUROLOGICAL SURGERY

## 2020-11-25 DEVICE — CLOSED CELL SELF-EXPANDING STENT
Type: IMPLANTABLE DEVICE | Status: FUNCTIONAL
Brand: CAROTID WALLSTENT®

## 2020-11-25 RX ORDER — LIDOCAINE HYDROCHLORIDE 10 MG/ML
INJECTION, SOLUTION EPIDURAL; INFILTRATION; INTRACAUDAL; PERINEURAL AS NEEDED
Status: DISCONTINUED | OUTPATIENT
Start: 2020-11-25 | End: 2020-11-25 | Stop reason: HOSPADM

## 2020-11-25 RX ORDER — ASPIRIN 81 MG/1
81 TABLET ORAL DAILY
Status: DISCONTINUED | OUTPATIENT
Start: 2020-11-25 | End: 2020-11-26 | Stop reason: HOSPADM

## 2020-11-25 RX ORDER — IODIXANOL 320 MG/ML
INJECTION, SOLUTION INTRAVASCULAR AS NEEDED
Status: DISCONTINUED | OUTPATIENT
Start: 2020-11-25 | End: 2020-11-25 | Stop reason: HOSPADM

## 2020-11-25 RX ORDER — HEPARIN SODIUM 1000 [USP'U]/ML
INJECTION, SOLUTION INTRAVENOUS; SUBCUTANEOUS CONTINUOUS PRN
Status: COMPLETED | OUTPATIENT
Start: 2020-11-25 | End: 2020-11-25

## 2020-11-25 RX ORDER — SODIUM CHLORIDE 9 MG/ML
INJECTION, SOLUTION INTRAVENOUS CONTINUOUS PRN
Status: COMPLETED | OUTPATIENT
Start: 2020-11-25 | End: 2020-11-25

## 2020-11-25 RX ADMIN — GABAPENTIN 400 MG: 400 CAPSULE ORAL at 08:22

## 2020-11-25 RX ADMIN — GABAPENTIN 400 MG: 400 CAPSULE ORAL at 16:20

## 2020-11-25 RX ADMIN — SODIUM CHLORIDE 75 ML/HR: 9 INJECTION, SOLUTION INTRAVENOUS at 04:56

## 2020-11-25 RX ADMIN — Medication 1 PATCH: at 13:21

## 2020-11-25 RX ADMIN — GABAPENTIN 400 MG: 400 CAPSULE ORAL at 20:18

## 2020-11-25 RX ADMIN — SODIUM CHLORIDE, PRESERVATIVE FREE 10 ML: 5 INJECTION INTRAVENOUS at 08:23

## 2020-11-25 RX ADMIN — PANTOPRAZOLE SODIUM 40 MG: 40 TABLET, DELAYED RELEASE ORAL at 08:22

## 2020-11-25 RX ADMIN — ATORVASTATIN CALCIUM 80 MG: 40 TABLET, FILM COATED ORAL at 20:18

## 2020-11-25 RX ADMIN — CLOPIDOGREL BISULFATE 75 MG: 75 TABLET ORAL at 08:22

## 2020-11-25 RX ADMIN — INSULIN LISPRO 3 UNITS: 100 INJECTION, SOLUTION INTRAVENOUS; SUBCUTANEOUS at 08:22

## 2020-11-25 RX ADMIN — INSULIN LISPRO 2 UNITS: 100 INJECTION, SOLUTION INTRAVENOUS; SUBCUTANEOUS at 20:17

## 2020-11-25 RX ADMIN — SODIUM CHLORIDE, PRESERVATIVE FREE 10 ML: 5 INJECTION INTRAVENOUS at 20:17

## 2020-11-25 RX ADMIN — HYDROCODONE BITARTRATE AND ACETAMINOPHEN 1 TABLET: 10; 325 TABLET ORAL at 13:21

## 2020-11-25 RX ADMIN — HYDROCODONE BITARTRATE AND ACETAMINOPHEN 1 TABLET: 10; 325 TABLET ORAL at 21:09

## 2020-11-25 RX ADMIN — INSULIN LISPRO 3 UNITS: 100 INJECTION, SOLUTION INTRAVENOUS; SUBCUTANEOUS at 16:36

## 2020-11-25 RX ADMIN — ASPIRIN 81 MG: 81 TABLET, FILM COATED ORAL at 08:22

## 2020-11-26 ENCOUNTER — READMISSION MANAGEMENT (OUTPATIENT)
Dept: CALL CENTER | Facility: HOSPITAL | Age: 55
End: 2020-11-26

## 2020-11-26 VITALS
RESPIRATION RATE: 16 BRPM | BODY MASS INDEX: 34.89 KG/M2 | TEMPERATURE: 98.5 F | HEIGHT: 62 IN | SYSTOLIC BLOOD PRESSURE: 119 MMHG | DIASTOLIC BLOOD PRESSURE: 62 MMHG | OXYGEN SATURATION: 96 % | WEIGHT: 189.6 LBS | HEART RATE: 89 BPM

## 2020-11-26 LAB
ALBUMIN SERPL-MCNC: 3.4 G/DL (ref 3.5–5.2)
ALBUMIN/GLOB SERPL: 1.4 G/DL
ALP SERPL-CCNC: 94 U/L (ref 39–117)
ALT SERPL W P-5'-P-CCNC: 10 U/L (ref 1–33)
ANION GAP SERPL CALCULATED.3IONS-SCNC: 9 MMOL/L (ref 5–15)
AST SERPL-CCNC: 15 U/L (ref 1–32)
BASOPHILS # BLD AUTO: 0.03 10*3/MM3 (ref 0–0.2)
BASOPHILS NFR BLD AUTO: 0.4 % (ref 0–1.5)
BILIRUB SERPL-MCNC: 0.5 MG/DL (ref 0–1.2)
BUN SERPL-MCNC: 15 MG/DL (ref 6–20)
BUN/CREAT SERPL: 25.4 (ref 7–25)
CALCIUM SPEC-SCNC: 8.9 MG/DL (ref 8.6–10.5)
CHLORIDE SERPL-SCNC: 107 MMOL/L (ref 98–107)
CO2 SERPL-SCNC: 24 MMOL/L (ref 22–29)
CREAT SERPL-MCNC: 0.59 MG/DL (ref 0.57–1)
DEPRECATED RDW RBC AUTO: 43.6 FL (ref 37–54)
EOSINOPHIL # BLD AUTO: 0.12 10*3/MM3 (ref 0–0.4)
EOSINOPHIL NFR BLD AUTO: 1.6 % (ref 0.3–6.2)
ERYTHROCYTE [DISTWIDTH] IN BLOOD BY AUTOMATED COUNT: 13.2 % (ref 12.3–15.4)
GFR SERPL CREATININE-BSD FRML MDRD: 105 ML/MIN/1.73
GLOBULIN UR ELPH-MCNC: 2.4 GM/DL
GLUCOSE BLDC GLUCOMTR-MCNC: 196 MG/DL (ref 70–130)
GLUCOSE SERPL-MCNC: 179 MG/DL (ref 65–99)
HCT VFR BLD AUTO: 41.8 % (ref 34–46.6)
HGB BLD-MCNC: 13.4 G/DL (ref 12–15.9)
IMM GRANULOCYTES # BLD AUTO: 0.02 10*3/MM3 (ref 0–0.05)
IMM GRANULOCYTES NFR BLD AUTO: 0.3 % (ref 0–0.5)
LYMPHOCYTES # BLD AUTO: 1.42 10*3/MM3 (ref 0.7–3.1)
LYMPHOCYTES NFR BLD AUTO: 18.6 % (ref 19.6–45.3)
MAGNESIUM SERPL-MCNC: 1.7 MG/DL (ref 1.6–2.6)
MCH RBC QN AUTO: 29 PG (ref 26.6–33)
MCHC RBC AUTO-ENTMCNC: 32.1 G/DL (ref 31.5–35.7)
MCV RBC AUTO: 90.5 FL (ref 79–97)
MONOCYTES # BLD AUTO: 0.66 10*3/MM3 (ref 0.1–0.9)
MONOCYTES NFR BLD AUTO: 8.7 % (ref 5–12)
NEUTROPHILS NFR BLD AUTO: 5.38 10*3/MM3 (ref 1.7–7)
NEUTROPHILS NFR BLD AUTO: 70.4 % (ref 42.7–76)
NRBC BLD AUTO-RTO: 0 /100 WBC (ref 0–0.2)
PHOSPHATE SERPL-MCNC: 3.3 MG/DL (ref 2.5–4.5)
PLATELET # BLD AUTO: 176 10*3/MM3 (ref 140–450)
PMV BLD AUTO: 10.8 FL (ref 6–12)
POTASSIUM SERPL-SCNC: 3.8 MMOL/L (ref 3.5–5.2)
PROT SERPL-MCNC: 5.8 G/DL (ref 6–8.5)
RBC # BLD AUTO: 4.62 10*6/MM3 (ref 3.77–5.28)
SODIUM SERPL-SCNC: 140 MMOL/L (ref 136–145)
WBC # BLD AUTO: 7.63 10*3/MM3 (ref 3.4–10.8)

## 2020-11-26 PROCEDURE — 83735 ASSAY OF MAGNESIUM: CPT | Performed by: INTERNAL MEDICINE

## 2020-11-26 PROCEDURE — 97164 PT RE-EVAL EST PLAN CARE: CPT

## 2020-11-26 PROCEDURE — 97168 OT RE-EVAL EST PLAN CARE: CPT

## 2020-11-26 PROCEDURE — 97110 THERAPEUTIC EXERCISES: CPT

## 2020-11-26 PROCEDURE — 97530 THERAPEUTIC ACTIVITIES: CPT

## 2020-11-26 PROCEDURE — 99239 HOSP IP/OBS DSCHRG MGMT >30: CPT | Performed by: INTERNAL MEDICINE

## 2020-11-26 PROCEDURE — 85025 COMPLETE CBC W/AUTO DIFF WBC: CPT | Performed by: INTERNAL MEDICINE

## 2020-11-26 PROCEDURE — 84100 ASSAY OF PHOSPHORUS: CPT | Performed by: INTERNAL MEDICINE

## 2020-11-26 PROCEDURE — 80053 COMPREHEN METABOLIC PANEL: CPT | Performed by: INTERNAL MEDICINE

## 2020-11-26 PROCEDURE — 82962 GLUCOSE BLOOD TEST: CPT

## 2020-11-26 PROCEDURE — 63710000001 INSULIN LISPRO (HUMAN) PER 5 UNITS: Performed by: NURSE PRACTITIONER

## 2020-11-26 RX ORDER — GABAPENTIN 800 MG/1
400 TABLET ORAL 3 TIMES DAILY
Refills: 0 | Status: ON HOLD
Start: 2020-11-26 | End: 2022-01-01 | Stop reason: SDUPTHER

## 2020-11-26 RX ORDER — ATORVASTATIN CALCIUM 80 MG/1
80 TABLET, FILM COATED ORAL NIGHTLY
Qty: 30 TABLET | Refills: 1 | Status: SHIPPED | OUTPATIENT
Start: 2020-11-26 | End: 2021-01-25 | Stop reason: SDUPTHER

## 2020-11-26 RX ORDER — CLOPIDOGREL BISULFATE 75 MG/1
75 TABLET ORAL DAILY
Qty: 60 TABLET | Refills: 0 | Status: SHIPPED | OUTPATIENT
Start: 2020-11-27 | End: 2021-01-21 | Stop reason: SDUPTHER

## 2020-11-26 RX ADMIN — CLOPIDOGREL BISULFATE 75 MG: 75 TABLET ORAL at 08:26

## 2020-11-26 RX ADMIN — GABAPENTIN 400 MG: 400 CAPSULE ORAL at 08:26

## 2020-11-26 RX ADMIN — INSULIN LISPRO 2 UNITS: 100 INJECTION, SOLUTION INTRAVENOUS; SUBCUTANEOUS at 08:26

## 2020-11-26 RX ADMIN — PANTOPRAZOLE SODIUM 40 MG: 40 TABLET, DELAYED RELEASE ORAL at 06:12

## 2020-11-26 RX ADMIN — ASPIRIN 81 MG: 81 TABLET, FILM COATED ORAL at 08:26

## 2020-11-26 RX ADMIN — Medication 1 PATCH: at 10:49

## 2020-11-26 RX ADMIN — SODIUM CHLORIDE, PRESERVATIVE FREE 10 ML: 5 INJECTION INTRAVENOUS at 08:26

## 2020-11-26 NOTE — OUTREACH NOTE
Prep Survey      Responses   [facility] patient discharged from?  [place]   Is LACE score < 7 ?  No   Eligibility  [facility]   Date of Admission  11/23/20   Date of Discharge  11/26/20   Discharge Disposition  Home or Self Care   Discharge diagnosis  Suspected cerebrovascular accident    Does the patient have one of the following disease processes/diagnoses(primary or secondary)?  Stroke (TIA)   Does the patient have Home health ordered?  No   Is there a DME ordered?  No   Prep survey completed?  Yes          Lupe Velazquez RN

## 2020-11-30 ENCOUNTER — TELEPHONE (OUTPATIENT)
Dept: NEUROSURGERY | Facility: CLINIC | Age: 55
End: 2020-11-30

## 2020-11-30 ENCOUNTER — TELEPHONE (OUTPATIENT)
Dept: FAMILY MEDICINE CLINIC | Facility: CLINIC | Age: 55
End: 2020-11-30

## 2020-11-30 ENCOUNTER — TRANSITIONAL CARE MANAGEMENT TELEPHONE ENCOUNTER (OUTPATIENT)
Dept: CALL CENTER | Facility: HOSPITAL | Age: 55
End: 2020-11-30

## 2020-11-30 NOTE — OUTREACH NOTE
Call Center TCM Note      Responses   [facility] patient discharged from?  [place]   Does the patient have one of the following disease processes/diagnoses(primary or secondary)?  Stroke (TIA)   TCM attempt successful?  Yes   Discharge diagnosis  Suspected cerebrovascular accident    Meds reviewed with patient/caregiver?  Yes   Is the patient having any side effects they believe may be caused by any medication additions or changes?  No   Does the patient have all medications ordered at discharge?  Yes   Is the patient taking all medications as directed (includes completed medication regime)?  Yes   Does the patient have a primary care provider?   Yes   Does the patient have an appointment with their PCP within 7 days of discharge?  Greater than 7 days   Comments regarding PCP  Dr Peters   What is preventing the patient from scheduling follow up appointments within 7 days of discharge?  Earlier appointment not available   Has the patient kept scheduled appointments due by today?  Yes   What is the Home health agency?   [facility]    Home health comments  Pt has not heard from [facility] and I spoke with Kathy in Intake and she has no order for pt. It is clearly ordered in hospital notes and d/c orders. Apparently order rejected as pt is out of service area for [facility]. Kathy is sending req to PCP for referral to [facility].    DME comments  Pt has rented a wheelchair and is requesting an order from PCP so ins will cover.    Psychosocial issues?  No   Does the patient require any assistance with activities of daily living such as eating, bathing, dressing, walking, etc.?  Yes   Does the patient have any residual symptoms from stroke/TIA?  Yes   Residual symptoms comments  Rt side upper and lower extremity weakness, difficulty with ambulation (pt is using walker at all times and wheelchair PRN)   Does the patient understand the diet ordered at discharge?  Yes   Did the patient receive a copy of their discharge  instructions?  Yes   What is the patient's perception of their health status since discharge?  Improving   Is the patient able to teach back FAST for Stroke?  Yes   Is the patient/caregiver able to teach back the risk factors for a stroke?  High blood pressure-goal below 120/80, Physical inactivity and obesity, HIgh red blood cell count, Excessive alcohol intake, Carotid or other artery disease, Smoking, Diabetes, History of TIAs, Illegal drug use, Sleep apnea, History of Afib, High Cholesterol   Is the patient/caregiver able to teach back signs and symptoms related to disease process for when to call PCP?  Yes   Is the patient/caregiver able to teach back signs and symptoms related to disease process for when to call 911?  Yes   If the patient is a current smoker, are they able to teach back resources for cessation?  -- [Per pt she is aware she needs to quit thank you]   Is the patient/caregiver able to teach back the hierarchy of who to call/visit for symptoms/problems? PCP, Specialist, Home health nurse, Urgent Care, ED, 911  Yes   TCM call completed?  Yes   Wrap up additional comments  Pt feels weakness on right side is improving slightly. She can walk 4-5 steps on her own. Pt is using walker in home. Please see TCM call  notes re: HH, and order for wheelchair. Pt is now sched for TCM FWP with PCP on 12/07/2020. I supplied pt the Neurosurgeon info and she will call for fwp. It is in hosp notes she needs fwp from carotid prodedure & to sched next one, but no info given to pt on d/c orders.           Lupe Bourgeois MA    11/30/2020, 13:00 EST

## 2020-11-30 NOTE — TELEPHONE ENCOUNTER
PATIENT WAS REFERRED TO [facility]; HOWEVER, THIS PATIENT IS OUT OF THEIR SERVICE AREA.

## 2020-12-01 ENCOUNTER — TELEPHONE (OUTPATIENT)
Dept: ORTHOPEDICS | Facility: OTHER | Age: 55
End: 2020-12-01

## 2020-12-05 LAB
QT INTERVAL: 396 MS
QTC INTERVAL: 448 MS

## 2020-12-07 ENCOUNTER — OFFICE VISIT (OUTPATIENT)
Dept: FAMILY MEDICINE CLINIC | Facility: CLINIC | Age: 55
End: 2020-12-07

## 2020-12-07 ENCOUNTER — READMISSION MANAGEMENT (OUTPATIENT)
Dept: CALL CENTER | Facility: HOSPITAL | Age: 55
End: 2020-12-07

## 2020-12-07 VITALS
HEART RATE: 88 BPM | HEIGHT: 62 IN | BODY MASS INDEX: 34.67 KG/M2 | TEMPERATURE: 97.9 F | OXYGEN SATURATION: 98 % | SYSTOLIC BLOOD PRESSURE: 128 MMHG | DIASTOLIC BLOOD PRESSURE: 70 MMHG

## 2020-12-07 DIAGNOSIS — G81.91 ACUTE RIGHT HEMIPARESIS (HCC): ICD-10-CM

## 2020-12-07 DIAGNOSIS — E11.65 UNCONTROLLED TYPE 2 DIABETES MELLITUS WITH HYPERGLYCEMIA (HCC): ICD-10-CM

## 2020-12-07 DIAGNOSIS — I63.9 CEREBROVASCULAR ACCIDENT (CVA), UNSPECIFIED MECHANISM (HCC): Primary | ICD-10-CM

## 2020-12-07 DIAGNOSIS — E78.5 HYPERLIPIDEMIA, UNSPECIFIED HYPERLIPIDEMIA TYPE: ICD-10-CM

## 2020-12-07 DIAGNOSIS — I10 BENIGN ESSENTIAL HYPERTENSION: ICD-10-CM

## 2020-12-07 PROCEDURE — 99214 OFFICE O/P EST MOD 30 MIN: CPT | Performed by: FAMILY MEDICINE

## 2020-12-07 NOTE — OUTREACH NOTE
Stroke Week 2 Survey      Responses   [facility] patient discharged from?  [place]   Does the patient have one of the following disease processes/diagnoses(primary or secondary)?  Stroke (TIA)   Week 2 attempt successful?  No   Unsuccessful attempts  Attempt 1          Kings Stanton RN

## 2020-12-08 ENCOUNTER — TREATMENT (OUTPATIENT)
Dept: PHYSICAL THERAPY | Facility: CLINIC | Age: 55
End: 2020-12-08

## 2020-12-08 DIAGNOSIS — Z78.9 IMPAIRED MOBILITY AND ADLS: ICD-10-CM

## 2020-12-08 DIAGNOSIS — R09.89 SUSPECTED CEREBROVASCULAR ACCIDENT (CVA): Primary | ICD-10-CM

## 2020-12-08 DIAGNOSIS — Z74.09 IMPAIRED FUNCTIONAL MOBILITY, BALANCE, GAIT, AND ENDURANCE: ICD-10-CM

## 2020-12-08 DIAGNOSIS — Z74.09 IMPAIRED MOBILITY AND ADLS: ICD-10-CM

## 2020-12-08 PROCEDURE — 97163 PT EVAL HIGH COMPLEX 45 MIN: CPT | Performed by: PHYSICAL THERAPIST

## 2020-12-08 NOTE — PROGRESS NOTES
Physical Therapy Initial Evaluation and Plan of Care      Patient: Raquel Espitia   : 1965  Diagnosis/ICD-10 Code:  Suspected cerebrovascular accident (CVA) [R09.89]  Referring practitioner: Cecil Gallagher MD  Date of Initial Visit: 2020  Today's Date: 2020  Patient seen for 1 sessions      Subjective:     Subjective Questionnaire: NATH   TUG 25.87 sec w/ rollator   10 Meter 22.03 sec w/rollator      Subjective Evaluation    History of Present Illness  Date of onset: 2020  Mechanism of injury: Pt states that in the middle of the night she got up to go to the restroom and she got sick and fell. She finally got up and was able to get to her bed. When she got up she fell. Her son got her to the ER where she was diagnosed with a stroke. She had blockages in both carotid arteries. Her entire R side was unable to move but has since come back slightly. She is using a rollator for all ambulation. She has stairs and it is challenging to get in and out of her home. She lives in a trailer and has 9 steps. Her home is flat except she has major issues getting in and out of the tub. She does see pain management and is on pain meds for low back pain. She was scheduled for surgery on her lumbar spine, however this has since been postponed. Her next office visit is in February. Lives with son and . Cannot currently do household chores. OT will be necessary for her R arm.       Precautions and Work Restrictions: draws SSI Quality of life: fair    Pain  Current pain ratin  At best pain rating: 3  At worst pain rating: 10  Location: lower back     Social Support  Lives in: trailer    Hand dominance: right             Objective          Strength/Myotome Testing     Left Hip   Planes of Motion   Flexion: 4+  Extension: 4+  Abduction: 4+    Right Hip   Planes of Motion   Flexion: 3-  Extension: 2  Abduction: 2    Left Knee   Flexion: 4+  Extension: 4+    Right Knee   Flexion: 3  Extension: 3    Left  Ankle/Foot   Dorsiflexion: 5  Inversion: 5  Eversion: 5    Right Ankle/Foot   Dorsiflexion: 2  Inversion: 3  Eversion: 1        PT Neuro         Assessment & Plan     Assessment  Impairments: abnormal coordination, abnormal gait, activity intolerance, impaired balance, impaired physical strength and safety issue  Assessment details: Patient is a 55 YOF with recent complications related to CVA. She demonstrates R side hemiplegia with resulting balance, strength and gait deficits. Pt is ambulating with the use of rollator, however a quad cane will be necessary in order for her to manage the stairs in her home. Patient will also require an occupational therapy order to address the lack of movement in her hand and arm. Patient will be seen for skilled PT intervention to address deficits in order to improve mobility and function.    Prognosis: fair  Functional Limitations: carrying objects, walking, standing and stooping  Goals  Plan Goals: STG (6 visits)  1. Patient will report compliance with initial HEP.   2. Patient to improve NATH balance score to >/= 15 /56 to decrease client's risk of falls.  3. Patient to perform TUG within 22 sec without LOB for improved functional mobility.  4. Patient to ambulate 10 meters without AD within 20 sec without LOB for improved       gait eleazar and functional mobility.    LTG (12 visits)  1. Patient will be I with final HEP.   2. Patient to improve NATH balance score to >/= 20 /56 to decrease client's risk of falls.  3. Patient to perform TUG within < 20 sec without LOB for improved functional mobility.  4. Patient to ambulate 10 meters without AD within < 18 sec without LOB for improved gait eleazar and functional mobility.          Plan  Therapy options: will be seen for skilled physical therapy services  Planned modality interventions: TENS  Planned therapy interventions: ADL retraining, balance/weight-bearing training, flexibility, gait training, manual therapy, neuromuscular  re-education, motor coordination training, postural training, strengthening, stretching, therapeutic activities, transfer training and home exercise program  Frequency: 1x week  Duration in visits: 12  Treatment plan discussed with: patient  Plan details: Patient will be seen 1x/wk x 8wks with treatment to include strengthening, stretching, manual therapy, neuromuscular re-education, balance, gait and endurance training.           Timed:  Manual Therapy:    0     mins  61153;  Therapeutic Exercise:    0     mins  48428;     Neuromuscular Danish:    0    mins  57230;    Therapeutic Activity:     0     mins  73594;     Gait Trainin     mins  12689;     Electrical Stimulation:    0     mins  92467 ( );    Untimed:  Canalith Repositioning    0     mins 66214    Timed Treatment:   0   mins   Total Treatment:     45   mins    PT SIGNATURE: Lida Meyers PT, DPT, MSCS, CDP  [place] License #[id number]  DATE TREATMENT INITIATED: 2020    Initial Certification Period: 3/8/2021  I certify that the therapy services are furnished while this patient is under my care.  The services outlined above are required by this patient, and will be reviewed every 90 days.     PHYSICIAN: Cecil Gallagher MD      DATE:     Please sign and return via fax to [phone].   Thank you,   [facility].

## 2020-12-09 ENCOUNTER — READMISSION MANAGEMENT (OUTPATIENT)
Dept: CALL CENTER | Facility: HOSPITAL | Age: 55
End: 2020-12-09

## 2020-12-09 NOTE — OUTREACH NOTE
Stroke Week 2 Survey      Responses   [facility] patient discharged from?  [place]   Does the patient have one of the following disease processes/diagnoses(primary or secondary)?  Stroke (TIA)   Week 2 attempt successful?  No   Unsuccessful attempts  Attempt 2          Maya Angeles RN

## 2020-12-10 ENCOUNTER — HOSPITAL ENCOUNTER (OUTPATIENT)
Dept: DIABETES SERVICES | Facility: HOSPITAL | Age: 55
Setting detail: RECURRING SERIES
Discharge: HOME | End: 2020-12-10

## 2020-12-10 NOTE — PROGRESS NOTES
Subjective   Raquel Espitia is a 55 y.o. female    Chief Complaint    Stroke  Right hemiparesis  Diabetes mellitus  Hypertension  Hyperlipidemia    History of Present Illness  Patient presents today for recheck related to recent stroke resulting in right hemiparesis.  Multiple risk factors including diabetes mellitus, hypertension, hyperlipidemia, tobacco abuse and strong family history of stroke.  Recent episode where she awoke during the night with some right-sided weakness but went back to sleep only to awaken with persisting symptoms.  She presented to the emergency room and was found to have a right sided hemiparesis with left hemispheric stroke.  Due to the timeframe she was unable to have any aborting type measures instituted.  Appropriate measures and treatment were instituted.  Is here today for follow-up.    The following portions of the patient's history were reviewed and updated as appropriate: allergies, current medications, past social history and problem list    Review of Systems   Constitutional: Negative for appetite change, chills, diaphoresis, fatigue, fever and unexpected weight change.   Eyes: Negative for visual disturbance.   Respiratory: Negative for cough, chest tightness, shortness of breath and wheezing.    Cardiovascular: Negative for chest pain, palpitations and leg swelling.   Gastrointestinal: Negative for abdominal pain, diarrhea, nausea and vomiting.   Endocrine: Negative for polydipsia, polyphagia and polyuria.   Genitourinary: Negative for dysuria, frequency and urgency.   Musculoskeletal: Negative for arthralgias, back pain and myalgias.   Skin: Negative for color change and rash.   Neurological: Positive for speech difficulty, weakness and numbness. Negative for dizziness, syncope, light-headedness and headaches.   Hematological: Negative for adenopathy. Does not bruise/bleed easily.       Objective     Vitals:    12/07/20 0916   BP: 128/70   Pulse: 88   Temp: 97.9 °F (36.6 °C)    SpO2: 98%       Physical Exam  Vitals signs and nursing note reviewed.   Constitutional:       Appearance: She is well-developed. She is not diaphoretic.   HENT:      Head: Normocephalic.   Eyes:      Conjunctiva/sclera: Conjunctivae normal.      Pupils: Pupils are equal, round, and reactive to light.   Neck:      Musculoskeletal: Neck supple.      Thyroid: No thyromegaly.      Vascular: No JVD.   Cardiovascular:      Rate and Rhythm: Normal rate and regular rhythm.      Pulses: Normal pulses.      Heart sounds: Normal heart sounds. No murmur.   Pulmonary:      Effort: Pulmonary effort is normal. No respiratory distress.      Breath sounds: Normal breath sounds.   Abdominal:      General: Bowel sounds are normal.      Palpations: Abdomen is soft.      Tenderness: There is no abdominal tenderness.   Lymphadenopathy:      Cervical: No cervical adenopathy.   Skin:     General: Skin is warm and dry.      Findings: No rash.   Neurological:      Mental Status: She is alert and oriented to person, place, and time.      Sensory: No sensory deficit.      Motor: Weakness present.   Psychiatric:         Mood and Affect: Mood normal.         Behavior: Behavior normal.         Assessment/Plan   Problems Addressed this Visit        Cardiovascular and Mediastinum    HTN (Chronic)       Endocrine    Uncontrolled T2DM      Other Visit Diagnoses     Cerebrovascular accident (CVA), unspecified mechanism (CMS/HCC)    -  Primary    Acute right hemiparesis (CMS/HCC)        Hyperlipidemia, unspecified hyperlipidemia type          Diagnoses       Codes Comments    Cerebrovascular accident (CVA), unspecified mechanism (CMS/HCC)    -  Primary ICD-10-CM: I63.9  ICD-9-CM: 434.91     Acute right hemiparesis (CMS/HCC)     ICD-10-CM: G81.91  ICD-9-CM: 342.90     Benign essential hypertension     ICD-10-CM: I10  ICD-9-CM: 401.1     Hyperlipidemia, unspecified hyperlipidemia type     ICD-10-CM: E78.5  ICD-9-CM: 272.4     Uncontrolled type 2  diabetes mellitus with hyperglycemia (CMS/[facility])     ICD-10-CM: E11.65  ICD-9-CM: 250.02         Continue physical therapy.  Written order for quad cane, tub bench and transport chair per PT request

## 2020-12-10 NOTE — CONSULTS
"Ms Espitia attended follow up outpatient diabetes management education class via phone-30 minute class. Educated on basic diabetes pathophysiology and how related to increased risk for complications, specifically stroke. Discussed s/sx of hyperglycemia and hypoglycemia and self management of both. Education provided on TLC diet, the plate method, as well as exercise recommendations. Stressed importance of ongoing, lifelong follow up with PCP for diabetes and other chronic health condition management. Stressed importance of taking all medications as prescribed. Patient was given opportunity to ask questions, and was also encouraged to pursue further diabetes education class with both RN and RD. Class educational materials were mailed to patient's home: [facility]'s \"Diabetes Basics\" booklet, as well as our contact information for any further questions or needs. Thank you for this referral.  "

## 2020-12-14 ENCOUNTER — TELEPHONE (OUTPATIENT)
Dept: FAMILY MEDICINE CLINIC | Facility: CLINIC | Age: 55
End: 2020-12-14

## 2020-12-14 NOTE — TELEPHONE ENCOUNTER
PATIENT CALLED TO SEE IF PHYSICAL THERAPY ([facility] ON [address]) HAD SENT OVER A REQUEST FOR A WHEEL CHAIR AND BATH SEAT FOR HER.    PATIENT WILL BE AT THIS NEW PHONE NUMBER FOR THE NEXT TWO DAYS. AFTER THAT TIME SHE WILL BE ABLE TO BE REACHED ON HER CELL PHONE [phone].      Raquel Espitia    [phone]

## 2020-12-15 ENCOUNTER — TELEPHONE (OUTPATIENT)
Dept: FAMILY MEDICINE CLINIC | Facility: CLINIC | Age: 55
End: 2020-12-15

## 2020-12-15 ENCOUNTER — TREATMENT (OUTPATIENT)
Dept: PHYSICAL THERAPY | Facility: CLINIC | Age: 55
End: 2020-12-15

## 2020-12-15 DIAGNOSIS — R27.8 DECREASED COORDINATION: ICD-10-CM

## 2020-12-15 DIAGNOSIS — Z78.9 IMPAIRED MOBILITY AND ADLS: Primary | ICD-10-CM

## 2020-12-15 DIAGNOSIS — R29.898 DECREASED GRIP STRENGTH OF RIGHT HAND: ICD-10-CM

## 2020-12-15 DIAGNOSIS — Z74.09 IMPAIRED MOBILITY AND ADLS: Primary | ICD-10-CM

## 2020-12-15 LAB — CREAT BLDA-MCNC: 0.6 MG/DL (ref 0.6–1.3)

## 2020-12-15 PROCEDURE — 97530 THERAPEUTIC ACTIVITIES: CPT | Performed by: OCCUPATIONAL THERAPIST

## 2020-12-15 PROCEDURE — 97166 OT EVAL MOD COMPLEX 45 MIN: CPT | Performed by: OCCUPATIONAL THERAPIST

## 2020-12-15 NOTE — TELEPHONE ENCOUNTER
Tiff from  Rehab called to check on status of an order request that was sent last Tuesday for a cane,  a tub bench, and transport chair.   Call back number is [phone] option 7

## 2020-12-15 NOTE — PROGRESS NOTES
Occupational Therapy Initial Evaluation and Plan of Care      Patient: Raquel Espitia   : 1965  Diagnosis/ICD-10 Code:  Impaired mobility and ADLs [Z74.09, Z78.9]  Referring practitioner: Vikram Marie*  Date of Initial Visit: Type: THERAPY  Noted: 12/15/2020  Today's Date: 12/15/2020  Patient seen for 1 sessions      Subjective:     Subjective Questionnaire: 9HPT R: 67.8 L: 28.78  Significantly decreased FMC including translation and in-hand manipulation requiring use of GM grasp.       Subjective Evaluation    History of Present Illness  Date of onset: 2020  Mechanism of injury: Pt states that in the middle of the night she got up to go to the restroom and she got sick and fell. She finally got up and was able to get to her bed. When she got up she fell. Her son got her to the ER where she was diagnosed with a stroke. Pt found to have blockages in both carotid arteries. Her entire R side was unable to move but has since had some improvements. She is using a rollator for all ambulation. She has 9 stairs to enter home which requires 3 people assist. Her home is one story with tub shower combo.Pt lives with son and .   She experiences dropping items such as utensils and cups, now required to eat L handed. Not able to perform HW. Pt able to pull up sweat pants/similar materials but not able to perform jeans on her own with buttons/zippers. Can currently tie shoe laces but has very difficult time, requiring significantly more effort to perform. Pt also w/difficulty pulling hair back in pony-tail and manipulating any small items/high level dexterity activities.   Pt also states having severe fatigue since CVA. Has N/T from CVA R side, elbow to fingertips and thigh to toes of LE.   She sees pain management and is on pain meds for low back pain. She has previously undergone spinal sx a few yrs ago and was scheduled for fusion extension on her lumbar spine, however will need to be postponed  since CVA. This causes spasms and significant pain and affects pt's ability to stand for any length of time along with bending. D/T back issues, pt has pain that goes down into LLE and causes N/T. Pt also experiences buckling of LLE when standing/walking.       Precautions and Work Restrictions: draws SSI Quality of life: fair    Pain  Current pain ratin  At best pain rating: 3  At worst pain rating: 10  Location: lower back     Social Support  Lives in: trailer    Hand dominance: right    Patient Goals  Patient goals for therapy: increased strength, independence with ADLs/IADLs, increased motion, improved balance and decreased pain  Patient goal: FMC/hand strength          Objective          Active Range of Motion   Left Shoulder   Flexion: WFL  Abduction: WFL    Right Shoulder   Flexion: 120 degrees   Abduction: 115 degrees     Right Elbow   Flexion: 100 degrees   Extension: 20 degrees     Right Wrist   Wrist flexion: 20 degrees   Wrist extension: 35 degrees     Additional Active Range of Motion Details  Able to oppose all digits, however difficult to reach V; able to flex and extend all PIP and DIP joints but requiring additional effort    Strength/Myotome Testing     Right Shoulder     Planes of Motion   Flexion: 4-     Right Elbow   Flexion: 3+  Extension: 3+    Left Wrist/Hand      (2nd hand position)    Trial 1: 30 lbs    Trial 2: 35 lbs    Trial 3: 35 lbs    Average: 33.33 lbs    Thumb Strength  Key/Lateral Pinch     Trial 1: 14 lbs    Trial 2: 14 lbs    Trial 3: 12 lbs    Average: 13.33 lbs    Right Wrist/Hand      (2nd hand position)     Trial 1: 20 lbs    Trial 2: 22 lbs    Trial 3: 22 lbs    Average: 21.33 lbs    Thumb Strength   Key/Lateral Pinch     Trial 1: 7 lbs    Trial 2: 8 lbs    Trial 3: 8 lbs    Average: 7.67 lbs    Ambulation     Comments   Using rollator for all mobility currently; reports mild foot drop RLE        OT Neuro         OT Exercises     Row Name 12/15/20 0898              Precautions    Existing Precautions/Restrictions  fall  -ST         Exercise 1    Exercise Name 1  OT IE completed per consult   -ST         Exercise 2    Exercise Name 2  education on sensory re-ed program  -ST         Exercise 3    Exercise Name 3  issued medium-soft putty with pincer and lateral grasp focus  -ST         Exercise 4    Exercise Name 4  opposition HEP to increase speed/accuracy of digit movement   -ST        User Key  (r) = Recorded By, (t) = Taken By, (c) = Cosigned By    Initials Name Provider Type    Etelvina Hernandez OTR Occupational Therapist           Assessment & Plan     Assessment  Impairments: abnormal coordination, abnormal gait, abnormal muscle firing, abnormal muscle tone, abnormal or restricted ROM, activity intolerance, impaired balance, impaired physical strength, lacks appropriate home exercise program, pain with function and safety issue  Assessment details: Pt presents with deficits related to acute CVA including decreased balance, impaired coordination and sensation and strength. Pt is R hand dominant and is affect on R side impacting significantly gross and FM grasp with current inability to self-feed and hand write. Pt also experiences significant difficulty with holding and dropping items and standing for any length of time d/t long standing LBP. Recommend skilled OT services to address above deficits and promote increased independence, strength, & participation in daily tasks.   Prognosis: fair  Functional Limitations: carrying objects, lifting, sleeping, walking, pulling, pushing, uncomfortable because of pain, moving in bed, sitting, standing, stooping, reaching behind back, reaching overhead and unable to perform repetitive tasks  Goals  Plan Goals:   Pt will score 55 seconds or less on the 9HPT to demonstrate improved accuracy and speed with fine motor coordination by 8 wks.      Pt will increase R  strength to 30 # by 8 wks to demonstrate improved strength and  function for daily tasks.     Pt will be independent with hand strengthening HEP to increase independence with ADL/IADL performance tasks by 4 wks.    Pt will be independent with hand FMC HEP to increase independence with ADL/IADL performance tasks by 4 wks.     Pt will be independent with R UE strengthening HEP to increase performance in ADL/IADL tasks by 4 wks.         Plan  Planned therapy interventions: ADL retraining, balance/weight-bearing training, fine motor coordination training, flexibility, functional ROM exercises, home exercise program, motor coordination training, neuromuscular re-education, postural training, strengthening, stretching, therapeutic activities, transfer training and IADL retraining  Frequency: 1x week  Duration in visits: 10  Treatment plan discussed with: patient  Plan details: Est. OT POC and goals to reflect above deficits and promote increased independence, safety, engagement and participation in daily tasks.         Timed:  Manual Therapy:    0     mins  44225;  Therapeutic Exercise:    0     mins  10083;     Neuromuscular Danish:    0    mins  61572;    Therapeutic Activity:     12     mins  05158;     Self-Care/ADL     0     mins  83313;   Sensory Int. Tech      0     mins 67604;  Ultrasound:     0     mins  34922;    Electrical Stimulation:    0     mins  15056 ( );    Untimed:  Electrical Stimulation:    0     mins  19228 ( );    Timed Treatment:   12   mins   Total Treatment:     45   mins    OT Signature: Etelvina Hennessy MS, OTR/L, CDP  [place] License #: [id number]  DATE TREATMENT INITIATED: 12/15/2020    Initial Certification Certification Period: 3/15/2021  I certify that the therapy services are furnished while this patient is under my care. The services outlined above are required by this patient and will be reviewed every 90 days.     Physician Signature: __________________________________  Vikram Stewart MD    Please sign and return via fax to  [phone]  Thank you,   [facility] Occupational Therapy

## 2020-12-16 NOTE — TELEPHONE ENCOUNTER
PT IS CHECKING ON THE STATUS OF MEDICAL SUPPLIES CANE, TUB , AND TRANSPORT CHAIR.    PT STATES THAT IF SHE NEEDS TO  PRESCRIPTION SHE CAN DO THAT TOMORROW.      PLEASE ADVISE  [phone]

## 2020-12-17 ENCOUNTER — PREP FOR SURGERY (OUTPATIENT)
Dept: OTHER | Facility: HOSPITAL | Age: 55
End: 2020-12-17

## 2020-12-17 ENCOUNTER — READMISSION MANAGEMENT (OUTPATIENT)
Dept: CALL CENTER | Facility: HOSPITAL | Age: 55
End: 2020-12-17

## 2020-12-17 ENCOUNTER — OFFICE VISIT (OUTPATIENT)
Dept: NEUROSURGERY | Facility: CLINIC | Age: 55
End: 2020-12-17

## 2020-12-17 ENCOUNTER — TELEPHONE (OUTPATIENT)
Dept: FAMILY MEDICINE CLINIC | Facility: CLINIC | Age: 55
End: 2020-12-17

## 2020-12-17 VITALS
DIASTOLIC BLOOD PRESSURE: 82 MMHG | WEIGHT: 190 LBS | HEIGHT: 62 IN | BODY MASS INDEX: 34.96 KG/M2 | SYSTOLIC BLOOD PRESSURE: 130 MMHG | TEMPERATURE: 96.8 F

## 2020-12-17 DIAGNOSIS — R53.1 ACUTE RIGHT-SIDED WEAKNESS: ICD-10-CM

## 2020-12-17 DIAGNOSIS — R09.89 SUSPECTED CEREBROVASCULAR ACCIDENT (CVA): Primary | ICD-10-CM

## 2020-12-17 DIAGNOSIS — I65.22 SYMPTOMATIC CAROTID ARTERY STENOSIS, LEFT: ICD-10-CM

## 2020-12-17 DIAGNOSIS — I65.21 ASYMPTOMATIC CAROTID ARTERY STENOSIS WITHOUT INFARCTION, RIGHT: ICD-10-CM

## 2020-12-17 DIAGNOSIS — I65.21 CAROTID STENOSIS, ASYMPTOMATIC, RIGHT: Primary | ICD-10-CM

## 2020-12-17 DIAGNOSIS — Z71.6 TOBACCO ABUSE COUNSELING: ICD-10-CM

## 2020-12-17 PROCEDURE — 99024 POSTOP FOLLOW-UP VISIT: CPT | Performed by: PHYSICIAN ASSISTANT

## 2020-12-17 RX ORDER — FAMOTIDINE 20 MG/1
20 TABLET, FILM COATED ORAL
Status: CANCELLED | OUTPATIENT
Start: 2020-12-17

## 2020-12-17 RX ORDER — CEFAZOLIN SODIUM 2 G/100ML
2 INJECTION, SOLUTION INTRAVENOUS ONCE
Status: CANCELLED | OUTPATIENT
Start: 2020-12-17 | End: 2020-12-17

## 2020-12-17 RX ORDER — SODIUM CHLORIDE 9 MG/ML
75 INJECTION, SOLUTION INTRAVENOUS CONTINUOUS
Status: CANCELLED | OUTPATIENT
Start: 2020-12-17

## 2020-12-17 NOTE — OUTREACH NOTE
Stroke Week 3 Survey      Responses   [facility] patient discharged from?  [place]   Does the patient have one of the following disease processes/diagnoses(primary or secondary)?  Stroke (TIA)   Week 3 attempt successful?  Yes   Call start time  1435   Call end time  1441   Discharge diagnosis  Suspected cerebrovascular accident    Person spoke with today (if not patient) and relationship  spouse-Faheem Isaac reviewed with patient/caregiver?  Yes   Is the patient taking all medications as directed (includes completed medication regime)?  Yes   Comments regarding appointments  Patient saw Dr. Patrick and going to schedule surgery right after Christmas for Carotid stenosis.   Will call patient tomorrw for complete details   Does the patient have a primary care provider?   Yes   Comments regarding PCP  Dr Peters-patient went to office today   Has the patient kept scheduled appointments due by today?  Yes   Has home health visited the patient within 72 hours of discharge?  No   Psychosocial issues?  No   Does the patient require any assistance with activities of daily living such as eating, bathing, dressing, walking, etc.?  Yes   Does the patient have any residual symptoms from stroke/TIA?  Yes   Does the patient understand the diet ordered at discharge?  Yes   Comments  Patient has been doing OT and PT outptatient.    Did the patient receive a copy of their discharge instructions?  Yes   What is the patient's perception of their health status since discharge?  Improving   Is the patient able to teach back FAST for Stroke?  Yes   Is the patient/caregiver able to teach back the hierarchy of who to call/visit for symptoms/problems? PCP, Specialist, Home health nurse, Urgent Care, ED, 911  Yes   Week 3 call completed?  Yes   Wrap up additional comments  Patient is doing well at this time.  She will be having surgery(Endarterectomy) after Christmas.  She will be recieveing details tomorrow.   She denies needs at  this time.           Abida Tapia RN

## 2020-12-17 NOTE — PROGRESS NOTES
Patient: Raquel Espitia  : 1965    Primary Care Provider: Vikram Stewart MD      Chief Complaint: Follow-up left hemispheric stroke and left carotid stenting    History of Present Illness:       Patient is a very fortunate 55-year-old female who has a history of an asymptomatic high-grade right carotid artery stenosis that had a left-sided stroke with right-sided hemiplegia and underwent urgent angiography and received a left internal carotid artery stent by Dr. Gregg Patrick procedure was performed on 2020.  Patient is back today and showing great signs of improvement still very weak in her right leg but is able to move her right arm and speak appropriately.  Patient states that she is much better.    Patient is back today for a post stent check as well as to discuss her right CEA.    Dr. Patrick accompanied me with the visit and spent about 10 or 15 minutes discussing her ongoing care.  He told her that the next step would be to get a carotid endarterectomy done hopefully by the end of the year to open up her very high-grade right internal carotid artery stenosis.    He went through risk benefits and expected outcomes of the surgery and we will be doing this with monitoring due to her high risk of stroke due to her ongoing smoking.    I spent about 10 minutes discussing her smoking and how this will limit her care not only with her cerebrovascular disease as well as her lumbar and cervical spine.    Review of Systems   Constitutional: Negative for activity change, appetite change, chills, diaphoresis, fatigue, fever and unexpected weight change.   HENT: Negative for congestion, dental problem, drooling, ear discharge, ear pain, facial swelling, hearing loss, mouth sores, nosebleeds, postnasal drip, rhinorrhea, sinus pressure, sinus pain, sneezing, sore throat, tinnitus, trouble swallowing and voice change.    Eyes: Negative for photophobia, pain, discharge, redness, itching and visual  disturbance.   Respiratory: Negative for apnea, cough, choking, chest tightness, shortness of breath, wheezing and stridor.    Cardiovascular: Negative for chest pain, palpitations and leg swelling.   Gastrointestinal: Negative for abdominal distention, abdominal pain, anal bleeding, blood in stool, constipation, diarrhea, nausea, rectal pain and vomiting.   Endocrine: Negative for cold intolerance, heat intolerance, polydipsia, polyphagia and polyuria.   Genitourinary: Negative for decreased urine volume, difficulty urinating, dyspareunia, dysuria, enuresis, flank pain, frequency, genital sores, hematuria, menstrual problem, pelvic pain, urgency, vaginal bleeding, vaginal discharge and vaginal pain.   Musculoskeletal: Negative for arthralgias, back pain, gait problem, joint swelling, myalgias, neck pain and neck stiffness.   Skin: Negative for color change, pallor, rash and wound.   Allergic/Immunologic: Negative for environmental allergies, food allergies and immunocompromised state.   Neurological: Positive for numbness. Negative for dizziness, tremors, seizures, syncope, facial asymmetry, speech difficulty, weakness, light-headedness and headaches.   Hematological: Negative for adenopathy. Does not bruise/bleed easily.   Psychiatric/Behavioral: Negative for agitation, behavioral problems, confusion, decreased concentration, dysphoric mood, hallucinations, self-injury, sleep disturbance and suicidal ideas. The patient is not nervous/anxious and is not hyperactive.        Past Medical History:     Past Medical History:   Diagnosis Date   • Acute bronchitis    • Asthma    • Diabetes mellitus (CMS/HCC)    • Edema    • GERD (gastroesophageal reflux disease)    • Hypertension    • Impaired fasting glucose    • Low back pain    • Pain of left calf        Family History:     Family History   Problem Relation Age of Onset   • Hypertension Mother    • Stroke Mother    • Cancer Father    • Stroke Father        Social  "History:    reports that she has been smoking cigarettes. She has a 60.00 pack-year smoking history. She has never used smokeless tobacco. She reports that she does not drink alcohol or use drugs.   SMOKING STATUS: I spent greater than 10 minutes educating the patient on smoking cessation, and discussed how smoking pertains to the particular disease process at hand.  The patient acknowledges understanding.      Surgical History:     Past Surgical History:   Procedure Laterality Date   • BACK SURGERY     •  SECTION     • CHOLECYSTECTOMY     • HAND SURGERY     • INTERVENTIONAL RADIOLOGY PROCEDURE Bilateral 2020    Procedure: CAROTID CEREBRAL ANGIOGRAM BILATERAL;  Surgeon: Gregg Patrick MD;  Location: [facility] INVASIVE LOCATION;  Service: Interventional Radiology;  Laterality: Bilateral;   • NECK SURGERY         Allergies:   Patient has no known allergies.    Physical Exam:    Vital Signs:/82   Temp 96.8 °F (36 °C)   Ht 157.5 cm (62\")   Wt 86.2 kg (190 lb)   BMI 34.75 kg/m²    BMI: Body mass index is 34.75 kg/m².     GENERAL:           The patient is in no acute distress, and is able to answer all questions appropriately.    Neck:          Supple without lymphadenopathy    Cardiovascular:       Peripheral pulses 2+ at dorsalis pedis and anterior tibialis    Lungs:         Breathing unlabored    Musculoskeletal:            strength is 5 out of 5 bilaterally.        Shoulder abduction is 5 out of 5.         Dorsiflexion is 5/5 Bilaterally       Plantarflexion is 5/5 bilaterally       Hip Flexion 5/5 bilaterally.         The patient´s gait is normal without antalgia.    Neurologic:          The patient is alert and oriented by 3.          Pupils are equal and reactive to light.         Visual fields are full.         Extraocular movements are intact without nystagmus.         Mild motor drift on right no facial droop difficulty with rapid alternating movements on the right with a " little bit of clumsiness but able to perform tasks but much slower than left side       sensation is equal bilaterally with no deficit.           Reflexes:  2+ through out    CRANIAL NERVES:         Cranial nerve II: Visual fields are full to confrontation.       Cranial nerves III, IV and VI: PERRLA DC.  Extraocular movements are intact.  Nystagmus is not present.       Cranial nerve V: Facial sensation is intact to light touch.       Cranial nerve VII: Muscles of facial expression revealed no asymmetry.       Cranial nerve VIII: Hearing is intact to finger rub bilaterally.       Cranial nerve IX and X: Palate elevates symmetrically.        Cranial nerve XI: Shoulder shrug is intact.       Cranial nerve XII: Tongue is midline without evidence of Atrophy or fasciculation.        Medical Decision Making    Data Review:   CTA CTP CT as well as MRI and angiogram reviewed with Dr. Patrick.    Diagnosis:   Symptomatic left carotid artery stenosis   Status post stent  Left hemisphere infarcts with right-sided deficit  Asymptomatic high-grade right carotid artery stenosis   Currently untreated   Will have a CEA soon    Treatment Options:   We are going to perform a right-sided CEA on her.  Patient does have a history of a cervical fusion that was done in the 1990s.  The incision is subtle but there.  Patient understands that there are obviously mortal risks to the surgery and wishes to proceed with this.  We will set her up within the next couple of weeks with a right CEA.    Patient's Body mass index is 34.75 kg/m². BMI is above normal parameters. Recommendations include: educational material.    No diagnosis found.

## 2020-12-17 NOTE — TELEPHONE ENCOUNTER
Patient stopped by today to check on the status of her medical supplies, can she get a call back?  Thank you.

## 2020-12-18 PROBLEM — I65.21 CAROTID STENOSIS, ASYMPTOMATIC, RIGHT: Status: ACTIVE | Noted: 2020-12-17

## 2020-12-22 ENCOUNTER — TREATMENT (OUTPATIENT)
Dept: PHYSICAL THERAPY | Facility: CLINIC | Age: 55
End: 2020-12-22

## 2020-12-22 ENCOUNTER — TELEPHONE (OUTPATIENT)
Dept: FAMILY MEDICINE CLINIC | Facility: CLINIC | Age: 55
End: 2020-12-22

## 2020-12-22 DIAGNOSIS — Z78.9 IMPAIRED MOBILITY AND ADLS: Primary | ICD-10-CM

## 2020-12-22 DIAGNOSIS — Z74.09 IMPAIRED MOBILITY AND ADLS: Primary | ICD-10-CM

## 2020-12-22 DIAGNOSIS — R29.898 DECREASED GRIP STRENGTH OF RIGHT HAND: ICD-10-CM

## 2020-12-22 DIAGNOSIS — R27.8 DECREASED COORDINATION: ICD-10-CM

## 2020-12-22 PROCEDURE — 97530 THERAPEUTIC ACTIVITIES: CPT | Performed by: OCCUPATIONAL THERAPIST

## 2020-12-22 PROCEDURE — 97110 THERAPEUTIC EXERCISES: CPT | Performed by: OCCUPATIONAL THERAPIST

## 2020-12-22 PROCEDURE — 97112 NEUROMUSCULAR REEDUCATION: CPT | Performed by: OCCUPATIONAL THERAPIST

## 2020-12-22 NOTE — PROGRESS NOTES
"Occupational Therapy Daily Progress Note  Visit: 2  Date of Initial Visit: Type: THERAPY  Noted: 12/15/2020      Patient: Raquel Espitia   : 1965  Diagnosis/ICD-10 Code:  Impaired mobility and ADLs [Z74.09, Z78.9]  Referring practitioner: Vikram Marie*  Date of Initial Visit: Type: THERAPY  Noted: 12/15/2020  Today's Date: 2020  Patient seen for 2 sessions      Subjective:   Patient reports: \"I've been working really hard on all the exercises you've given me. I'm really disappointed that I still haven't heard anything from my  Office about my DME equipment from 2 wks ago\"  Pain: 0/10  Subjective Questionnaire: n/a  Clinical Progress: improved  Home Program Compliance: Yes  Treatment has included: therapeutic exercise, neuromuscular re-education and therapeutic activity    Subjective   Objective     OT Neuro         OT Exercises     Row Name 20 0825             Precautions    Existing Precautions/Restrictions  fall  -ST         Exercise 1    Exercise Name 1  warm up w/neural priming using NuStep L6  -ST      Time (Minutes) 1  8  -ST         Exercise 2    Exercise Name 2  seated on dynamic air disc performing pronation/supination, wrist f/e; isolated assist with full end ROM for supination w/hold of 5 seconds to increase stretch  -ST      Equipment 2  [place]  -ST      Weights/Plates 2  1  -ST      Sets 2  2  -ST      Reps 2  10  -ST         Exercise 3    Exercise Name 3  shoulder pulley  -ST      Sets 3  2  -ST      Time (Minutes) 3  2  -ST         Exercise 4    Exercise Name 4  seated on dynamic air disc IR, ER, cuing for scap retraction and appropriate elbow position  -ST      Equipment 4  [place]  -ST      Weights/Plates 4  1  -ST      Sets 4  2  -ST      Reps 4  10  -ST         Exercise 5    Exercise Name 5  shoulder abd/adduction, cuing for correct RUE position; rest break b/t sets   -ST      Equipment 5  [place]  -ST      Weights/Plates 5  1  -ST      Sets 5  2  -ST      Reps 5  " 10  -ST         Exercise 6    Exercise Name 6  FMC and strengthening R hand in-hand manipulation and translation using nut bolt to place/remove  -ST         Exercise 7    Exercise Name 7  FMC prehension focus using tweezers to  varying sized/shaped objects  -ST         Exercise 8    Exercise Name 8  FMC prehension with proprioception and strengtheing using olive grabber to retrieve varying shaped/sized using olive grabber   -ST        User Key  (r) = Recorded By, (t) = Taken By, (c) = Cosigned By    Initials Name Provider Type    Etelvina Hernandez OTR Occupational Therapist           Assessment & Plan     Assessment  Assessment details: Pt continues to have numbness in digits IV and V, V being worse but is aware of decreased proprioception and overall  strength and accomodates well. Pt demonstrates significant tightness in R shoulder and requires cuing for proper position by bringing elbow in slightly. Pt requires increased time and effort for high level dexterity tasks that require translation and in-hand manipulation. Discussed and educated on appropriate tub bench options for home.    Plan  Plan details: Continue w/OT POC and goals as est.         Visit Diagnoses:    ICD-10-CM ICD-9-CM   1. Impaired mobility and ADLs  Z74.09 V49.89    Z78.9    2. Decreased coordination  R27.8 781.3   3. Decreased  strength of right hand  R29.898 729.89             Timed:  Manual Therapy:    0     mins  60783;  Therapeutic Exercise:    30     mins  98241;     Neuromuscular Danish:    10    mins  39087;    Therapeutic Activity:     30     mins  15544;     Self-Care/ADL     0     mins  77183;   Sensory Int. Tech      0     mins 14543;  Ultrasound:     0     mins  40591;    Electrical Stimulation:    0     mins  56824 ( );    Untimed:  Electrical Stimulation:    0     mins  05745 ( );    Timed Treatment:   70   mins   Total Treatment:     70   mins    OT Signature: Etelvina Hennessy MS, OTR/L, CDP  [place]  License #: [id number]

## 2020-12-22 NOTE — TELEPHONE ENCOUNTER
[facility] IS REQUESTING OFFICE NOTES FAXED FOR PT'S MEDICAL SUPPLIES          Fax  [phone]    PLEASE ADVISE  [phone]

## 2020-12-23 ENCOUNTER — TELEMEDICINE (OUTPATIENT)
Dept: FAMILY MEDICINE CLINIC | Facility: CLINIC | Age: 55
End: 2020-12-23

## 2020-12-23 DIAGNOSIS — J01.90 ACUTE SINUSITIS, RECURRENCE NOT SPECIFIED, UNSPECIFIED LOCATION: Primary | ICD-10-CM

## 2020-12-23 DIAGNOSIS — J02.9 PHARYNGITIS, UNSPECIFIED ETIOLOGY: ICD-10-CM

## 2020-12-23 PROCEDURE — 99213 OFFICE O/P EST LOW 20 MIN: CPT | Performed by: FAMILY MEDICINE

## 2020-12-23 RX ORDER — CETIRIZINE HYDROCHLORIDE, PSEUDOEPHEDRINE HYDROCHLORIDE 5; 120 MG/1; MG/1
1 TABLET, FILM COATED, EXTENDED RELEASE ORAL 2 TIMES DAILY
Qty: 14 TABLET | Refills: 0 | Status: SHIPPED | OUTPATIENT
Start: 2020-12-23 | End: 2021-01-01

## 2020-12-23 RX ORDER — AZITHROMYCIN 250 MG/1
TABLET, FILM COATED ORAL
Qty: 6 TABLET | Refills: 0 | Status: SHIPPED | OUTPATIENT
Start: 2020-12-23 | End: 2020-12-29

## 2020-12-24 ENCOUNTER — READMISSION MANAGEMENT (OUTPATIENT)
Dept: CALL CENTER | Facility: HOSPITAL | Age: 55
End: 2020-12-24

## 2020-12-24 NOTE — OUTREACH NOTE
Stroke Week 4 Survey      Responses   [facility] patient discharged from?  [place]   Does the patient have one of the following disease processes/diagnoses(primary or secondary)?  Stroke (TIA)   Week 4 attempt successful?  Yes   Call start time  0819   Call end time  0822   Discharge diagnosis  Suspected cerebrovascular accident    Meds reviewed with patient/caregiver?  Yes   Is the patient taking all medications as directed (includes completed medication regime)?  Yes   Has the patient kept scheduled appointments due by today?  Yes   Is the patient still receiving Home Health Services?  No   Does the patient require any assistance with activities of daily living such as eating, bathing, dressing, walking, etc.?  No   Does the patient have any residual symptoms from stroke/TIA?  Yes   Residual symptoms comments  Little weakness right side   Does the patient understand the diet ordered at discharge?  Yes   What is the patient's perception of their health status since discharge?  Improving   Is the patient able to teach back FAST for Stroke?  Yes   Is the patient/caregiver able to teach back the risk factors for a stroke?  High blood pressure-goal below 120/80, Diabetes, Smoking, High Cholesterol, Physical inactivity and obesity   Week 4 Call Completed?  Yes   Would the patient like one additional call?  No   Graduated  Yes   Is the patient interested in additional calls from an ambulatory ?  NOTE:  applies to high risk patients requiring additional follow-up.  No   Did the patient feel the follow up calls were helpful during their recovery period?  Yes          Maya Angeles RN

## 2020-12-29 ENCOUNTER — APPOINTMENT (OUTPATIENT)
Dept: PREADMISSION TESTING | Facility: HOSPITAL | Age: 55
End: 2020-12-29

## 2020-12-29 VITALS — BODY MASS INDEX: 31.12 KG/M2 | HEIGHT: 62 IN | WEIGHT: 169.09 LBS

## 2020-12-29 DIAGNOSIS — I65.21 CAROTID STENOSIS, ASYMPTOMATIC, RIGHT: ICD-10-CM

## 2020-12-29 LAB
ALBUMIN SERPL-MCNC: 4 G/DL (ref 3.5–5.2)
ALBUMIN/GLOB SERPL: 1.5 G/DL
ALP SERPL-CCNC: 129 U/L (ref 39–117)
ALT SERPL W P-5'-P-CCNC: 15 U/L (ref 1–33)
ANION GAP SERPL CALCULATED.3IONS-SCNC: 11 MMOL/L (ref 5–15)
AST SERPL-CCNC: 11 U/L (ref 1–32)
BILIRUB SERPL-MCNC: 0.4 MG/DL (ref 0–1.2)
BUN SERPL-MCNC: 19 MG/DL (ref 6–20)
BUN/CREAT SERPL: 27.5 (ref 7–25)
CALCIUM SPEC-SCNC: 9.2 MG/DL (ref 8.6–10.5)
CHLORIDE SERPL-SCNC: 103 MMOL/L (ref 98–107)
CO2 SERPL-SCNC: 27 MMOL/L (ref 22–29)
CREAT SERPL-MCNC: 0.69 MG/DL (ref 0.57–1)
DEPRECATED RDW RBC AUTO: 42.7 FL (ref 37–54)
ERYTHROCYTE [DISTWIDTH] IN BLOOD BY AUTOMATED COUNT: 13.4 % (ref 12.3–15.4)
GFR SERPL CREATININE-BSD FRML MDRD: 88 ML/MIN/1.73
GLOBULIN UR ELPH-MCNC: 2.7 GM/DL
GLUCOSE SERPL-MCNC: 236 MG/DL (ref 65–99)
HBA1C MFR BLD: 8.7 % (ref 4.8–5.6)
HCT VFR BLD AUTO: 48 % (ref 34–46.6)
HGB BLD-MCNC: 15.4 G/DL (ref 12–15.9)
MCH RBC QN AUTO: 28.2 PG (ref 26.6–33)
MCHC RBC AUTO-ENTMCNC: 32.1 G/DL (ref 31.5–35.7)
MCV RBC AUTO: 87.8 FL (ref 79–97)
PLATELET # BLD AUTO: 220 10*3/MM3 (ref 140–450)
PMV BLD AUTO: 10.6 FL (ref 6–12)
POTASSIUM SERPL-SCNC: 4 MMOL/L (ref 3.5–5.2)
PROT SERPL-MCNC: 6.7 G/DL (ref 6–8.5)
QT INTERVAL: 368 MS
QTC INTERVAL: 455 MS
RBC # BLD AUTO: 5.47 10*6/MM3 (ref 3.77–5.28)
SARS-COV-2 RNA RESP QL NAA+PROBE: NOT DETECTED
SODIUM SERPL-SCNC: 141 MMOL/L (ref 136–145)
WBC # BLD AUTO: 8.56 10*3/MM3 (ref 3.4–10.8)

## 2020-12-29 PROCEDURE — 36415 COLL VENOUS BLD VENIPUNCTURE: CPT

## 2020-12-29 PROCEDURE — U0004 COV-19 TEST NON-CDC HGH THRU: HCPCS

## 2020-12-29 PROCEDURE — 93005 ELECTROCARDIOGRAM TRACING: CPT

## 2020-12-29 PROCEDURE — 93010 ELECTROCARDIOGRAM REPORT: CPT | Performed by: INTERNAL MEDICINE

## 2020-12-29 PROCEDURE — 80053 COMPREHEN METABOLIC PANEL: CPT

## 2020-12-29 PROCEDURE — 83036 HEMOGLOBIN GLYCOSYLATED A1C: CPT

## 2020-12-29 PROCEDURE — 99254 IP/OBS CNSLTJ NEW/EST MOD 60: CPT | Performed by: INTERNAL MEDICINE

## 2020-12-29 PROCEDURE — C9803 HOPD COVID-19 SPEC COLLECT: HCPCS

## 2020-12-29 PROCEDURE — 85027 COMPLETE CBC AUTOMATED: CPT

## 2020-12-29 NOTE — PROGRESS NOTES
Subjective   Raquel Espitia is a 55 y.o. female   Patient requests and consents to telemedicine visit using audiovisual aid    Chief Complaint    Sinus congestion  Sore throat    History of Present Illness  Patient presents via telemedicine visit today complaining of sinus pain and pressure, postnasal drainage, nasal drainage, headache and sore throat.  She denies any fever or chills.  No cough, chest pain or shortness of breath.  No Covid exposure that she is aware of.  No change in sense of taste or smell.    The following portions of the patient's history were reviewed and updated as appropriate: allergies, current medications, past social history and problem list    Review of Systems   Constitutional: Negative for chills, fatigue and fever.   HENT: Positive for congestion, ear pain, postnasal drip, rhinorrhea, sinus pressure and sore throat. Negative for trouble swallowing.    Eyes: Positive for pain.   Respiratory: Positive for cough. Negative for shortness of breath.    Neurological: Positive for headaches. Negative for dizziness.   Hematological: Negative for adenopathy.       Objective     There were no vitals filed for this visit.    Physical Exam  Constitutional:       Appearance: Normal appearance.   HENT:      Head: Normocephalic and atraumatic.   Neurological:      Mental Status: She is alert and oriented to person, place, and time.   Psychiatric:         Mood and Affect: Mood normal.         Behavior: Behavior normal.         Assessment/Plan   Problems Addressed this Visit     None      Visit Diagnoses     Acute sinusitis, recurrence not specified, unspecified location    -  Primary    Relevant Medications    azithromycin (Zithromax Z-Jin) 250 MG tablet    cetirizine-pseudoephedrine (ZyrTEC-D) 5-120 MG per 12 hr tablet    Pharyngitis, unspecified etiology        Relevant Medications    azithromycin (Zithromax Z-Jin) 250 MG tablet      Diagnoses       Codes Comments    Acute sinusitis, recurrence not  specified, unspecified location    -  Primary ICD-10-CM: J01.90  ICD-9-CM: 461.9     Pharyngitis, unspecified etiology     ICD-10-CM: J02.9  ICD-9-CM: 462         Telemedicine visit 18 minutes

## 2020-12-30 ENCOUNTER — HOSPITAL ENCOUNTER (INPATIENT)
Facility: HOSPITAL | Age: 55
LOS: 1 days | Discharge: HOME | End: 2020-12-31
Attending: NEUROLOGICAL SURGERY | Admitting: NEUROLOGICAL SURGERY

## 2020-12-30 ENCOUNTER — APPOINTMENT (OUTPATIENT)
Dept: NEUROLOGY | Facility: HOSPITAL | Age: 55
End: 2020-12-30

## 2020-12-30 ENCOUNTER — ANESTHESIA (OUTPATIENT)
Dept: PERIOP | Facility: HOSPITAL | Age: 55
End: 2020-12-30

## 2020-12-30 ENCOUNTER — ANESTHESIA EVENT (OUTPATIENT)
Dept: PERIOP | Facility: HOSPITAL | Age: 55
End: 2020-12-30

## 2020-12-30 DIAGNOSIS — I65.21 CAROTID STENOSIS, ASYMPTOMATIC, RIGHT: ICD-10-CM

## 2020-12-30 PROBLEM — I63.9 CEREBROVASCULAR ACCIDENT (CVA) (HCC): Status: ACTIVE | Noted: 2020-11-23

## 2020-12-30 PROBLEM — I77.9 CAROTID ARTERY DISEASE: Status: ACTIVE | Noted: 2020-12-30

## 2020-12-30 LAB
GLUCOSE BLDC GLUCOMTR-MCNC: 207 MG/DL (ref 70–130)
GLUCOSE BLDC GLUCOMTR-MCNC: 269 MG/DL (ref 70–130)
GLUCOSE BLDC GLUCOMTR-MCNC: 320 MG/DL (ref 70–130)
GLUCOSE BLDC GLUCOMTR-MCNC: 398 MG/DL (ref 70–130)

## 2020-12-30 PROCEDURE — 94799 UNLISTED PULMONARY SVC/PX: CPT

## 2020-12-30 PROCEDURE — 35301 RECHANNELING OF ARTERY: CPT | Performed by: PHYSICIAN ASSISTANT

## 2020-12-30 PROCEDURE — 88304 TISSUE EXAM BY PATHOLOGIST: CPT | Performed by: NEUROLOGICAL SURGERY

## 2020-12-30 PROCEDURE — 25010000002 CEFAZOLIN PER 500 MG: Performed by: NEUROLOGICAL SURGERY

## 2020-12-30 PROCEDURE — 25010000002 PHENYLEPHRINE PER 1 ML: Performed by: NURSE ANESTHETIST, CERTIFIED REGISTERED

## 2020-12-30 PROCEDURE — 94640 AIRWAY INHALATION TREATMENT: CPT

## 2020-12-30 PROCEDURE — 25010000002 HEPARIN (PORCINE) PER 1000 UNITS: Performed by: NEUROLOGICAL SURGERY

## 2020-12-30 PROCEDURE — 25010000002 ONDANSETRON PER 1 MG: Performed by: NURSE ANESTHETIST, CERTIFIED REGISTERED

## 2020-12-30 PROCEDURE — 88311 DECALCIFY TISSUE: CPT | Performed by: NEUROLOGICAL SURGERY

## 2020-12-30 PROCEDURE — 63710000001 INSULIN LISPRO (HUMAN) PER 5 UNITS: Performed by: INTERNAL MEDICINE

## 2020-12-30 PROCEDURE — 82962 GLUCOSE BLOOD TEST: CPT

## 2020-12-30 PROCEDURE — 25010000002 MORPHINE PER 10 MG: Performed by: NURSE ANESTHETIST, CERTIFIED REGISTERED

## 2020-12-30 PROCEDURE — 35301 RECHANNELING OF ARTERY: CPT | Performed by: NEUROLOGICAL SURGERY

## 2020-12-30 PROCEDURE — 03UK0KZ SUPPLEMENT RIGHT INTERNAL CAROTID ARTERY WITH NONAUTOLOGOUS TISSUE SUBSTITUTE, OPEN APPROACH: ICD-10-PCS | Performed by: NEUROLOGICAL SURGERY

## 2020-12-30 PROCEDURE — 99222 1ST HOSP IP/OBS MODERATE 55: CPT | Performed by: INTERNAL MEDICINE

## 2020-12-30 PROCEDURE — C1768 GRAFT, VASCULAR: HCPCS | Performed by: NEUROLOGICAL SURGERY

## 2020-12-30 PROCEDURE — 25010000002 DEXAMETHASONE: Performed by: PHYSICIAN ASSISTANT

## 2020-12-30 PROCEDURE — 25010000002 NEOSTIGMINE 10 MG/10ML SOLUTION: Performed by: NURSE ANESTHETIST, CERTIFIED REGISTERED

## 2020-12-30 PROCEDURE — 25010000002 PROPOFOL 10 MG/ML EMULSION: Performed by: NURSE ANESTHETIST, CERTIFIED REGISTERED

## 2020-12-30 PROCEDURE — 25010000002 HEPARIN (PORCINE) PER 1000 UNITS: Performed by: NURSE ANESTHETIST, CERTIFIED REGISTERED

## 2020-12-30 PROCEDURE — 25010000003 CEFAZOLIN IN DEXTROSE 2-4 GM/100ML-% SOLUTION: Performed by: PHYSICIAN ASSISTANT

## 2020-12-30 PROCEDURE — 03CK0ZZ EXTIRPATION OF MATTER FROM RIGHT INTERNAL CAROTID ARTERY, OPEN APPROACH: ICD-10-PCS | Performed by: NEUROLOGICAL SURGERY

## 2020-12-30 PROCEDURE — 25010000002 HALOPERIDOL LACTATE PER 5 MG: Performed by: NURSE ANESTHETIST, CERTIFIED REGISTERED

## 2020-12-30 PROCEDURE — 63710000001 INSULIN LISPRO (HUMAN) PER 5 UNITS: Performed by: ANESTHESIOLOGY

## 2020-12-30 PROCEDURE — 95816 EEG AWAKE AND DROWSY: CPT

## 2020-12-30 PROCEDURE — 95955 EEG DURING SURGERY: CPT

## 2020-12-30 PROCEDURE — 63710000001 INSULIN LISPRO (HUMAN) PER 5 UNITS: Performed by: NURSE PRACTITIONER

## 2020-12-30 DEVICE — VASCU-GUARD PERIPHERAL VASCULAR PATCH IS PREPARED FROM BOVINE PERICARDIUM WHICH IS CROSS-LINKED WITH GLUTARALDEHYDE. THE PERICARDIUM IS PROCURED FROM CATTLE ORIGINATING IN THE UNITED STATES. VASCU-GUARD PERIPHERAL VASCULAR PATCH IS CHEMICALLY STERILIZED USING ETHANOL AND PROPYLENE OXIDE. VASCU-GUARD PERIPHERAL VASCULAR PATCH HAS BEEN TREATED WITH 1 MOLAR SODIUM HYDROXIDE FOR A MINIMUM OF 60 MINUTES AT 20 - 25 C.  VASCU-GUARD PERIPHERAL VASCULAR PATCH IS PACKAGED IN A CONTAINER FILLED WITH STERILE, NON-PYROGENIC WATER CONTAINING PROPYLENE OXIDE. THE CONTENTS OF THE UNOPENED, UNDAMAGED CONTAINER ARE STERILE.
Type: IMPLANTABLE DEVICE | Site: CAROTID | Status: FUNCTIONAL
Brand: VASCU-GUARD PERIPHERAL VASCULAR PATCH

## 2020-12-30 DEVICE — HEMOST ABS SURGIFOAM SZ100 8X12 10MM: Type: IMPLANTABLE DEVICE | Site: CAROTID | Status: FUNCTIONAL

## 2020-12-30 DEVICE — CLIP LIGAT VASC HORIZON TI MD BLU 6CT: Type: IMPLANTABLE DEVICE | Site: CAROTID | Status: FUNCTIONAL

## 2020-12-30 DEVICE — CLIP LIGAT VASC HORIZON TI SM YEL 6CT: Type: IMPLANTABLE DEVICE | Site: CAROTID | Status: FUNCTIONAL

## 2020-12-30 DEVICE — FLOSEAL HEMOSTATIC MATRIX, 10ML
Type: IMPLANTABLE DEVICE | Site: CAROTID | Status: FUNCTIONAL
Brand: FLOSEAL HEMOSTATIC MATRIX

## 2020-12-30 DEVICE — ABSORBABLE HEMOSTAT (OXIDIZED REGENERATED CELLULOSE, U.S.P.)
Type: IMPLANTABLE DEVICE | Site: CAROTID | Status: FUNCTIONAL
Brand: SURGICEL

## 2020-12-30 RX ORDER — LIDOCAINE HYDROCHLORIDE AND EPINEPHRINE 5; 5 MG/ML; UG/ML
INJECTION, SOLUTION INFILTRATION; PERINEURAL AS NEEDED
Status: DISCONTINUED | OUTPATIENT
Start: 2020-12-30 | End: 2020-12-30 | Stop reason: HOSPADM

## 2020-12-30 RX ORDER — ASPIRIN 81 MG/1
81 TABLET ORAL DAILY
Status: DISCONTINUED | OUTPATIENT
Start: 2020-12-30 | End: 2020-12-31 | Stop reason: HOSPADM

## 2020-12-30 RX ORDER — ASPIRIN 81 MG/1
81 TABLET, CHEWABLE ORAL DAILY
Status: DISCONTINUED | OUTPATIENT
Start: 2020-12-30 | End: 2020-12-30 | Stop reason: SDUPTHER

## 2020-12-30 RX ORDER — PROPOFOL 10 MG/ML
VIAL (ML) INTRAVENOUS AS NEEDED
Status: DISCONTINUED | OUTPATIENT
Start: 2020-12-30 | End: 2020-12-30 | Stop reason: SURG

## 2020-12-30 RX ORDER — PROMETHAZINE HYDROCHLORIDE 25 MG/1
25 SUPPOSITORY RECTAL ONCE AS NEEDED
Status: DISCONTINUED | OUTPATIENT
Start: 2020-12-30 | End: 2020-12-30 | Stop reason: HOSPADM

## 2020-12-30 RX ORDER — ACETAMINOPHEN 325 MG/1
650 TABLET ORAL EVERY 4 HOURS PRN
Status: DISCONTINUED | OUTPATIENT
Start: 2020-12-30 | End: 2020-12-31 | Stop reason: HOSPADM

## 2020-12-30 RX ORDER — ONDANSETRON 2 MG/ML
INJECTION INTRAMUSCULAR; INTRAVENOUS AS NEEDED
Status: DISCONTINUED | OUTPATIENT
Start: 2020-12-30 | End: 2020-12-30 | Stop reason: SURG

## 2020-12-30 RX ORDER — SODIUM CHLORIDE 9 MG/ML
75 INJECTION, SOLUTION INTRAVENOUS CONTINUOUS
Status: DISCONTINUED | OUTPATIENT
Start: 2020-12-30 | End: 2020-12-31 | Stop reason: HOSPADM

## 2020-12-30 RX ORDER — CLOPIDOGREL BISULFATE 75 MG/1
75 TABLET ORAL DAILY
Status: DISCONTINUED | OUTPATIENT
Start: 2020-12-30 | End: 2020-12-30 | Stop reason: SDUPTHER

## 2020-12-30 RX ORDER — VARENICLINE TARTRATE 0.5 MG/1
1 TABLET, FILM COATED ORAL 2 TIMES DAILY
Status: DISCONTINUED | OUTPATIENT
Start: 2020-12-30 | End: 2020-12-31 | Stop reason: HOSPADM

## 2020-12-30 RX ORDER — BACLOFEN 10 MG/1
10 TABLET ORAL 2 TIMES DAILY PRN
Status: DISCONTINUED | OUTPATIENT
Start: 2020-12-30 | End: 2020-12-31 | Stop reason: HOSPADM

## 2020-12-30 RX ORDER — ALBUTEROL SULFATE 90 UG/1
2 AEROSOL, METERED RESPIRATORY (INHALATION) EVERY 4 HOURS PRN
Status: DISCONTINUED | OUTPATIENT
Start: 2020-12-30 | End: 2020-12-31 | Stop reason: HOSPADM

## 2020-12-30 RX ORDER — SCOLOPAMINE TRANSDERMAL SYSTEM 1 MG/1
1 PATCH, EXTENDED RELEASE TRANSDERMAL
Status: DISCONTINUED | OUTPATIENT
Start: 2020-12-30 | End: 2020-12-30 | Stop reason: HOSPADM

## 2020-12-30 RX ORDER — LIDOCAINE HYDROCHLORIDE 10 MG/ML
0.5 INJECTION, SOLUTION EPIDURAL; INFILTRATION; INTRACAUDAL; PERINEURAL ONCE AS NEEDED
Status: COMPLETED | OUTPATIENT
Start: 2020-12-30 | End: 2020-12-30

## 2020-12-30 RX ORDER — SODIUM CHLORIDE 0.9 % (FLUSH) 0.9 %
3-10 SYRINGE (ML) INJECTION AS NEEDED
Status: DISCONTINUED | OUTPATIENT
Start: 2020-12-30 | End: 2020-12-30 | Stop reason: HOSPADM

## 2020-12-30 RX ORDER — SODIUM CHLORIDE 0.9 % (FLUSH) 0.9 %
10 SYRINGE (ML) INJECTION EVERY 12 HOURS SCHEDULED
Status: DISCONTINUED | OUTPATIENT
Start: 2020-12-30 | End: 2020-12-30 | Stop reason: HOSPADM

## 2020-12-30 RX ORDER — CLOPIDOGREL BISULFATE 75 MG/1
75 TABLET ORAL DAILY
Status: DISCONTINUED | OUTPATIENT
Start: 2020-12-31 | End: 2020-12-31 | Stop reason: HOSPADM

## 2020-12-30 RX ORDER — ONDANSETRON 2 MG/ML
4 INJECTION INTRAMUSCULAR; INTRAVENOUS ONCE AS NEEDED
Status: COMPLETED | OUTPATIENT
Start: 2020-12-30 | End: 2020-12-30

## 2020-12-30 RX ORDER — GABAPENTIN 300 MG/1
300 CAPSULE ORAL EVERY 8 HOURS SCHEDULED
Status: DISCONTINUED | OUTPATIENT
Start: 2020-12-30 | End: 2020-12-31 | Stop reason: HOSPADM

## 2020-12-30 RX ORDER — FENTANYL CITRATE 50 UG/ML
50 INJECTION, SOLUTION INTRAMUSCULAR; INTRAVENOUS
Status: DISCONTINUED | OUTPATIENT
Start: 2020-12-30 | End: 2020-12-30 | Stop reason: HOSPADM

## 2020-12-30 RX ORDER — HYDROMORPHONE HYDROCHLORIDE 1 MG/ML
0.25 INJECTION, SOLUTION INTRAMUSCULAR; INTRAVENOUS; SUBCUTANEOUS
Status: DISCONTINUED | OUTPATIENT
Start: 2020-12-30 | End: 2020-12-30 | Stop reason: HOSPADM

## 2020-12-30 RX ORDER — SODIUM CHLORIDE, SODIUM LACTATE, POTASSIUM CHLORIDE, CALCIUM CHLORIDE 600; 310; 30; 20 MG/100ML; MG/100ML; MG/100ML; MG/100ML
9 INJECTION, SOLUTION INTRAVENOUS CONTINUOUS
Status: DISCONTINUED | OUTPATIENT
Start: 2020-12-30 | End: 2020-12-31 | Stop reason: HOSPADM

## 2020-12-30 RX ORDER — DEXTROSE MONOHYDRATE 25 G/50ML
25 INJECTION, SOLUTION INTRAVENOUS
Status: DISCONTINUED | OUTPATIENT
Start: 2020-12-30 | End: 2020-12-31 | Stop reason: HOSPADM

## 2020-12-30 RX ORDER — SODIUM CHLORIDE 0.9 % (FLUSH) 0.9 %
10 SYRINGE (ML) INJECTION AS NEEDED
Status: DISCONTINUED | OUTPATIENT
Start: 2020-12-30 | End: 2020-12-31 | Stop reason: HOSPADM

## 2020-12-30 RX ORDER — ATORVASTATIN CALCIUM 40 MG/1
80 TABLET, FILM COATED ORAL NIGHTLY
Status: DISCONTINUED | OUTPATIENT
Start: 2020-12-30 | End: 2020-12-31 | Stop reason: HOSPADM

## 2020-12-30 RX ORDER — IPRATROPIUM BROMIDE AND ALBUTEROL SULFATE 2.5; .5 MG/3ML; MG/3ML
3 SOLUTION RESPIRATORY (INHALATION) ONCE AS NEEDED
Status: COMPLETED | OUTPATIENT
Start: 2020-12-30 | End: 2020-12-30

## 2020-12-30 RX ORDER — SODIUM CHLORIDE 0.9 % (FLUSH) 0.9 %
10 SYRINGE (ML) INJECTION AS NEEDED
Status: DISCONTINUED | OUTPATIENT
Start: 2020-12-30 | End: 2020-12-30 | Stop reason: HOSPADM

## 2020-12-30 RX ORDER — LIDOCAINE HYDROCHLORIDE 10 MG/ML
INJECTION, SOLUTION EPIDURAL; INFILTRATION; INTRACAUDAL; PERINEURAL AS NEEDED
Status: DISCONTINUED | OUTPATIENT
Start: 2020-12-30 | End: 2020-12-30 | Stop reason: SURG

## 2020-12-30 RX ORDER — CEFAZOLIN SODIUM 2 G/100ML
2 INJECTION, SOLUTION INTRAVENOUS EVERY 8 HOURS
Status: COMPLETED | OUTPATIENT
Start: 2020-12-30 | End: 2020-12-30

## 2020-12-30 RX ORDER — FAMOTIDINE 20 MG/1
20 TABLET, FILM COATED ORAL
Status: COMPLETED | OUTPATIENT
Start: 2020-12-30 | End: 2020-12-30

## 2020-12-30 RX ORDER — PANTOPRAZOLE SODIUM 40 MG/1
40 TABLET, DELAYED RELEASE ORAL
Status: DISCONTINUED | OUTPATIENT
Start: 2020-12-31 | End: 2020-12-31 | Stop reason: HOSPADM

## 2020-12-30 RX ORDER — PROMETHAZINE HYDROCHLORIDE 25 MG/1
25 TABLET ORAL ONCE AS NEEDED
Status: DISCONTINUED | OUTPATIENT
Start: 2020-12-30 | End: 2020-12-30 | Stop reason: HOSPADM

## 2020-12-30 RX ORDER — SODIUM CHLORIDE 0.9 % (FLUSH) 0.9 %
3 SYRINGE (ML) INJECTION EVERY 12 HOURS SCHEDULED
Status: DISCONTINUED | OUTPATIENT
Start: 2020-12-30 | End: 2020-12-30 | Stop reason: HOSPADM

## 2020-12-30 RX ORDER — ATRACURIUM BESYLATE 10 MG/ML
INJECTION, SOLUTION INTRAVENOUS AS NEEDED
Status: DISCONTINUED | OUTPATIENT
Start: 2020-12-30 | End: 2020-12-30 | Stop reason: SURG

## 2020-12-30 RX ORDER — NEOSTIGMINE METHYLSULFATE 1 MG/ML
INJECTION, SOLUTION INTRAVENOUS AS NEEDED
Status: DISCONTINUED | OUTPATIENT
Start: 2020-12-30 | End: 2020-12-30 | Stop reason: SURG

## 2020-12-30 RX ORDER — FAMOTIDINE 20 MG/1
20 TABLET, FILM COATED ORAL ONCE
Status: DISCONTINUED | OUTPATIENT
Start: 2020-12-30 | End: 2020-12-30 | Stop reason: SDUPTHER

## 2020-12-30 RX ORDER — NALOXONE HCL 0.4 MG/ML
0.4 VIAL (ML) INJECTION AS NEEDED
Status: DISCONTINUED | OUTPATIENT
Start: 2020-12-30 | End: 2020-12-30 | Stop reason: HOSPADM

## 2020-12-30 RX ORDER — HYDROCODONE BITARTRATE AND ACETAMINOPHEN 5; 325 MG/1; MG/1
1 TABLET ORAL EVERY 8 HOURS PRN
Status: DISCONTINUED | OUTPATIENT
Start: 2020-12-30 | End: 2020-12-31 | Stop reason: HOSPADM

## 2020-12-30 RX ORDER — IPRATROPIUM BROMIDE AND ALBUTEROL SULFATE 2.5; .5 MG/3ML; MG/3ML
3 SOLUTION RESPIRATORY (INHALATION) EVERY 4 HOURS PRN
Status: DISCONTINUED | OUTPATIENT
Start: 2020-12-30 | End: 2020-12-31 | Stop reason: HOSPADM

## 2020-12-30 RX ORDER — FAMOTIDINE 10 MG/ML
20 INJECTION, SOLUTION INTRAVENOUS ONCE
Status: DISCONTINUED | OUTPATIENT
Start: 2020-12-30 | End: 2020-12-30 | Stop reason: SDUPTHER

## 2020-12-30 RX ORDER — GLIPIZIDE 10 MG/1
10 TABLET ORAL
Status: DISCONTINUED | OUTPATIENT
Start: 2020-12-31 | End: 2020-12-31 | Stop reason: HOSPADM

## 2020-12-30 RX ORDER — MAGNESIUM HYDROXIDE 1200 MG/15ML
LIQUID ORAL AS NEEDED
Status: DISCONTINUED | OUTPATIENT
Start: 2020-12-30 | End: 2020-12-30 | Stop reason: HOSPADM

## 2020-12-30 RX ORDER — NORTRIPTYLINE HYDROCHLORIDE 25 MG/1
25 CAPSULE ORAL NIGHTLY
Status: DISCONTINUED | OUTPATIENT
Start: 2020-12-30 | End: 2020-12-31 | Stop reason: HOSPADM

## 2020-12-30 RX ORDER — HYDROCODONE BITARTRATE AND ACETAMINOPHEN 5; 325 MG/1; MG/1
1 TABLET ORAL ONCE AS NEEDED
Status: DISCONTINUED | OUTPATIENT
Start: 2020-12-30 | End: 2020-12-30 | Stop reason: HOSPADM

## 2020-12-30 RX ORDER — DROPERIDOL 2.5 MG/ML
0.62 INJECTION, SOLUTION INTRAMUSCULAR; INTRAVENOUS ONCE AS NEEDED
Status: DISCONTINUED | OUTPATIENT
Start: 2020-12-30 | End: 2020-12-30 | Stop reason: HOSPADM

## 2020-12-30 RX ORDER — NICOTINE 21 MG/24HR
1 PATCH, TRANSDERMAL 24 HOURS TRANSDERMAL
Status: DISCONTINUED | OUTPATIENT
Start: 2020-12-30 | End: 2020-12-31 | Stop reason: HOSPADM

## 2020-12-30 RX ORDER — GLYCOPYRROLATE 0.2 MG/ML
INJECTION INTRAMUSCULAR; INTRAVENOUS AS NEEDED
Status: DISCONTINUED | OUTPATIENT
Start: 2020-12-30 | End: 2020-12-30 | Stop reason: SURG

## 2020-12-30 RX ORDER — CEFAZOLIN SODIUM 2 G/100ML
2 INJECTION, SOLUTION INTRAVENOUS ONCE
Status: COMPLETED | OUTPATIENT
Start: 2020-12-30 | End: 2020-12-30

## 2020-12-30 RX ORDER — HEPARIN SODIUM 1000 [USP'U]/ML
INJECTION, SOLUTION INTRAVENOUS; SUBCUTANEOUS AS NEEDED
Status: DISCONTINUED | OUTPATIENT
Start: 2020-12-30 | End: 2020-12-30 | Stop reason: SURG

## 2020-12-30 RX ORDER — DIAZEPAM 5 MG/1
5 TABLET ORAL EVERY 6 HOURS PRN
Status: DISCONTINUED | OUTPATIENT
Start: 2020-12-30 | End: 2020-12-31 | Stop reason: HOSPADM

## 2020-12-30 RX ORDER — NICOTINE POLACRILEX 4 MG
15 LOZENGE BUCCAL
Status: DISCONTINUED | OUTPATIENT
Start: 2020-12-30 | End: 2020-12-31 | Stop reason: HOSPADM

## 2020-12-30 RX ORDER — HALOPERIDOL 5 MG/ML
1 INJECTION INTRAMUSCULAR ONCE AS NEEDED
Status: COMPLETED | OUTPATIENT
Start: 2020-12-30 | End: 2020-12-30

## 2020-12-30 RX ORDER — MORPHINE SULFATE 4 MG/ML
INJECTION, SOLUTION INTRAMUSCULAR; INTRAVENOUS AS NEEDED
Status: DISCONTINUED | OUTPATIENT
Start: 2020-12-30 | End: 2020-12-30 | Stop reason: SURG

## 2020-12-30 RX ORDER — DROPERIDOL 2.5 MG/ML
0.62 INJECTION, SOLUTION INTRAMUSCULAR; INTRAVENOUS AS NEEDED
Status: DISCONTINUED | OUTPATIENT
Start: 2020-12-30 | End: 2020-12-30 | Stop reason: HOSPADM

## 2020-12-30 RX ORDER — LIDOCAINE HYDROCHLORIDE 10 MG/ML
INJECTION, SOLUTION EPIDURAL; INFILTRATION; INTRACAUDAL; PERINEURAL AS NEEDED
Status: DISCONTINUED | OUTPATIENT
Start: 2020-12-30 | End: 2020-12-30 | Stop reason: HOSPADM

## 2020-12-30 RX ORDER — SODIUM CHLORIDE 0.9 % (FLUSH) 0.9 %
3 SYRINGE (ML) INJECTION EVERY 12 HOURS SCHEDULED
Status: DISCONTINUED | OUTPATIENT
Start: 2020-12-30 | End: 2020-12-31 | Stop reason: HOSPADM

## 2020-12-30 RX ORDER — SODIUM CHLORIDE 9 MG/ML
INJECTION, SOLUTION INTRAVENOUS AS NEEDED
Status: DISCONTINUED | OUTPATIENT
Start: 2020-12-30 | End: 2020-12-30 | Stop reason: HOSPADM

## 2020-12-30 RX ADMIN — VARENICLINE TARTRATE 1 MG: 0.5 TABLET, FILM COATED ORAL at 14:02

## 2020-12-30 RX ADMIN — GABAPENTIN 300 MG: 300 CAPSULE ORAL at 14:01

## 2020-12-30 RX ADMIN — MORPHINE SULFATE 1 MG: 4 INJECTION INTRAVENOUS at 07:51

## 2020-12-30 RX ADMIN — NEOSTIGMINE 3 MG: 1 INJECTION INTRAVENOUS at 09:43

## 2020-12-30 RX ADMIN — PROPOFOL 125 MG: 10 INJECTION, EMULSION INTRAVENOUS at 07:32

## 2020-12-30 RX ADMIN — CEFAZOLIN SODIUM 2 G: 10 INJECTION, POWDER, FOR SOLUTION INTRAVENOUS at 23:11

## 2020-12-30 RX ADMIN — METOPROLOL TARTRATE 12.5 MG: 25 TABLET, FILM COATED ORAL at 14:01

## 2020-12-30 RX ADMIN — INSULIN LISPRO 4 UNITS: 100 INJECTION, SOLUTION INTRAVENOUS; SUBCUTANEOUS at 10:32

## 2020-12-30 RX ADMIN — MORPHINE SULFATE 1 MG: 4 INJECTION INTRAVENOUS at 10:01

## 2020-12-30 RX ADMIN — CEFAZOLIN SODIUM 2 G: 10 INJECTION, POWDER, FOR SOLUTION INTRAVENOUS at 15:00

## 2020-12-30 RX ADMIN — PHENYLEPHRINE HYDROCHLORIDE 0.25 MCG/KG/MIN: 10 INJECTION INTRAVENOUS at 08:25

## 2020-12-30 RX ADMIN — PROPOFOL 75 MG: 10 INJECTION, EMULSION INTRAVENOUS at 07:36

## 2020-12-30 RX ADMIN — ASPIRIN 81 MG: 81 TABLET, FILM COATED ORAL at 14:01

## 2020-12-30 RX ADMIN — ATORVASTATIN CALCIUM 80 MG: 40 TABLET, FILM COATED ORAL at 20:29

## 2020-12-30 RX ADMIN — NICARDIPINE HYDROCHLORIDE 10 MG/HR: 0.1 INJECTION, SOLUTION INTRAVENOUS at 07:36

## 2020-12-30 RX ADMIN — SODIUM CHLORIDE, POTASSIUM CHLORIDE, SODIUM LACTATE AND CALCIUM CHLORIDE 9 ML/HR: 600; 310; 30; 20 INJECTION, SOLUTION INTRAVENOUS at 06:47

## 2020-12-30 RX ADMIN — IPRATROPIUM BROMIDE AND ALBUTEROL SULFATE 3 ML: 2.5; .5 SOLUTION RESPIRATORY (INHALATION) at 11:52

## 2020-12-30 RX ADMIN — NORTRIPTYLINE HYDROCHLORIDE 25 MG: 25 CAPSULE ORAL at 21:45

## 2020-12-30 RX ADMIN — LIDOCAINE HYDROCHLORIDE 50 MG: 10 INJECTION, SOLUTION EPIDURAL; INFILTRATION; INTRACAUDAL; PERINEURAL at 07:32

## 2020-12-30 RX ADMIN — CEFAZOLIN SODIUM 2 G: 2 INJECTION, SOLUTION INTRAVENOUS at 07:26

## 2020-12-30 RX ADMIN — ONDANSETRON 4 MG: 2 INJECTION INTRAMUSCULAR; INTRAVENOUS at 10:57

## 2020-12-30 RX ADMIN — HALOPERIDOL LACTATE 1 MG: 5 INJECTION INTRAMUSCULAR at 11:43

## 2020-12-30 RX ADMIN — SODIUM CHLORIDE, PRESERVATIVE FREE 3 ML: 5 INJECTION INTRAVENOUS at 14:03

## 2020-12-30 RX ADMIN — MORPHINE SULFATE 1 MG: 4 INJECTION INTRAVENOUS at 08:39

## 2020-12-30 RX ADMIN — LIDOCAINE HYDROCHLORIDE 0.5 ML: 10 INJECTION, SOLUTION EPIDURAL; INFILTRATION; INTRACAUDAL; PERINEURAL at 06:47

## 2020-12-30 RX ADMIN — SODIUM CHLORIDE 75 ML/HR: 9 INJECTION, SOLUTION INTRAVENOUS at 13:07

## 2020-12-30 RX ADMIN — METOPROLOL TARTRATE 25 MG: 25 TABLET, FILM COATED ORAL at 06:47

## 2020-12-30 RX ADMIN — ATRACURIUM BESYLATE 10 MG: 10 INJECTION, SOLUTION INTRAVENOUS at 08:26

## 2020-12-30 RX ADMIN — SODIUM CHLORIDE: 9 INJECTION, SOLUTION INTRAVENOUS at 07:26

## 2020-12-30 RX ADMIN — FAMOTIDINE 20 MG: 20 TABLET ORAL at 06:47

## 2020-12-30 RX ADMIN — ONDANSETRON 4 MG: 2 INJECTION INTRAMUSCULAR; INTRAVENOUS at 09:43

## 2020-12-30 RX ADMIN — ATRACURIUM BESYLATE 50 MG: 10 INJECTION, SOLUTION INTRAVENOUS at 07:32

## 2020-12-30 RX ADMIN — HYDROCODONE BITARTRATE AND ACETAMINOPHEN 1 TABLET: 5; 325 TABLET ORAL at 15:57

## 2020-12-30 RX ADMIN — VARENICLINE TARTRATE 1 MG: 0.5 TABLET, FILM COATED ORAL at 20:29

## 2020-12-30 RX ADMIN — HEPARIN SODIUM 3500 UNITS: 1000 INJECTION, SOLUTION INTRAVENOUS; SUBCUTANEOUS at 08:15

## 2020-12-30 RX ADMIN — GLYCOPYRROLATE 0.4 MG: 0.2 INJECTION INTRAMUSCULAR; INTRAVENOUS at 09:43

## 2020-12-30 RX ADMIN — METFORMIN HYDROCHLORIDE 1000 MG: 1000 TABLET ORAL at 17:34

## 2020-12-30 RX ADMIN — SODIUM CHLORIDE, PRESERVATIVE FREE 3 ML: 5 INJECTION INTRAVENOUS at 20:31

## 2020-12-30 RX ADMIN — Medication 1 PATCH: at 11:55

## 2020-12-30 RX ADMIN — GABAPENTIN 300 MG: 300 CAPSULE ORAL at 21:45

## 2020-12-30 RX ADMIN — INSULIN LISPRO 6 UNITS: 100 INJECTION, SOLUTION INTRAVENOUS; SUBCUTANEOUS at 17:34

## 2020-12-30 RX ADMIN — DEXAMETHASONE SODIUM PHOSPHATE 10 MG: 10 INJECTION INTRAMUSCULAR; INTRAVENOUS at 07:40

## 2020-12-30 RX ADMIN — MORPHINE SULFATE 1 MG: 4 INJECTION INTRAVENOUS at 07:29

## 2020-12-30 RX ADMIN — SCOPALAMINE 1 PATCH: 1 PATCH, EXTENDED RELEASE TRANSDERMAL at 11:20

## 2020-12-30 RX ADMIN — INSULIN LISPRO 7 UNITS: 100 INJECTION, SOLUTION INTRAVENOUS; SUBCUTANEOUS at 23:11

## 2020-12-31 ENCOUNTER — READMISSION MANAGEMENT (OUTPATIENT)
Dept: CALL CENTER | Facility: HOSPITAL | Age: 55
End: 2020-12-31

## 2020-12-31 VITALS
DIASTOLIC BLOOD PRESSURE: 43 MMHG | SYSTOLIC BLOOD PRESSURE: 88 MMHG | TEMPERATURE: 98.3 F | BODY MASS INDEX: 30.43 KG/M2 | WEIGHT: 165.34 LBS | HEART RATE: 82 BPM | OXYGEN SATURATION: 90 % | RESPIRATION RATE: 16 BRPM | HEIGHT: 62 IN

## 2020-12-31 LAB
ANION GAP SERPL CALCULATED.3IONS-SCNC: 10 MMOL/L (ref 5–15)
BASOPHILS # BLD AUTO: 0.04 10*3/MM3 (ref 0–0.2)
BASOPHILS NFR BLD AUTO: 0.3 % (ref 0–1.5)
BUN SERPL-MCNC: 11 MG/DL (ref 6–20)
BUN/CREAT SERPL: 22 (ref 7–25)
CALCIUM SPEC-SCNC: 7.9 MG/DL (ref 8.6–10.5)
CHLORIDE SERPL-SCNC: 109 MMOL/L (ref 98–107)
CO2 SERPL-SCNC: 23 MMOL/L (ref 22–29)
CREAT SERPL-MCNC: 0.5 MG/DL (ref 0.57–1)
CYTO UR: NORMAL
DEPRECATED RDW RBC AUTO: 42.8 FL (ref 37–54)
EOSINOPHIL # BLD AUTO: 0.1 10*3/MM3 (ref 0–0.4)
EOSINOPHIL NFR BLD AUTO: 0.9 % (ref 0.3–6.2)
ERYTHROCYTE [DISTWIDTH] IN BLOOD BY AUTOMATED COUNT: 13.2 % (ref 12.3–15.4)
GFR SERPL CREATININE-BSD FRML MDRD: 128 ML/MIN/1.73
GLUCOSE BLDC GLUCOMTR-MCNC: 195 MG/DL (ref 70–130)
GLUCOSE BLDC GLUCOMTR-MCNC: 222 MG/DL (ref 70–130)
GLUCOSE SERPL-MCNC: 172 MG/DL (ref 65–99)
HCT VFR BLD AUTO: 37.4 % (ref 34–46.6)
HGB BLD-MCNC: 12.2 G/DL (ref 12–15.9)
IMM GRANULOCYTES # BLD AUTO: 0.18 10*3/MM3 (ref 0–0.05)
IMM GRANULOCYTES NFR BLD AUTO: 1.5 % (ref 0–0.5)
LAB AP CASE REPORT: NORMAL
LAB AP CLINICAL INFORMATION: NORMAL
LYMPHOCYTES # BLD AUTO: 1.19 10*3/MM3 (ref 0.7–3.1)
LYMPHOCYTES NFR BLD AUTO: 10.1 % (ref 19.6–45.3)
MCH RBC QN AUTO: 28.9 PG (ref 26.6–33)
MCHC RBC AUTO-ENTMCNC: 32.6 G/DL (ref 31.5–35.7)
MCV RBC AUTO: 88.6 FL (ref 79–97)
MONOCYTES # BLD AUTO: 0.86 10*3/MM3 (ref 0.1–0.9)
MONOCYTES NFR BLD AUTO: 7.3 % (ref 5–12)
NEUTROPHILS NFR BLD AUTO: 79.9 % (ref 42.7–76)
NEUTROPHILS NFR BLD AUTO: 9.36 10*3/MM3 (ref 1.7–7)
NRBC BLD AUTO-RTO: 0 /100 WBC (ref 0–0.2)
PATH REPORT.FINAL DX SPEC: NORMAL
PATH REPORT.GROSS SPEC: NORMAL
PLATELET # BLD AUTO: 170 10*3/MM3 (ref 140–450)
PMV BLD AUTO: 10.6 FL (ref 6–12)
POTASSIUM SERPL-SCNC: 3.2 MMOL/L (ref 3.5–5.2)
RBC # BLD AUTO: 4.22 10*6/MM3 (ref 3.77–5.28)
SODIUM SERPL-SCNC: 142 MMOL/L (ref 136–145)
WBC # BLD AUTO: 11.73 10*3/MM3 (ref 3.4–10.8)

## 2020-12-31 PROCEDURE — 85025 COMPLETE CBC W/AUTO DIFF WBC: CPT | Performed by: NEUROLOGICAL SURGERY

## 2020-12-31 PROCEDURE — 99232 SBSQ HOSP IP/OBS MODERATE 35: CPT | Performed by: INTERNAL MEDICINE

## 2020-12-31 PROCEDURE — 63710000001 INSULIN LISPRO (HUMAN) PER 5 UNITS: Performed by: NURSE PRACTITIONER

## 2020-12-31 PROCEDURE — 80048 BASIC METABOLIC PNL TOTAL CA: CPT | Performed by: NEUROLOGICAL SURGERY

## 2020-12-31 PROCEDURE — 82962 GLUCOSE BLOOD TEST: CPT

## 2020-12-31 PROCEDURE — 97162 PT EVAL MOD COMPLEX 30 MIN: CPT

## 2020-12-31 PROCEDURE — 99024 POSTOP FOLLOW-UP VISIT: CPT | Performed by: PHYSICIAN ASSISTANT

## 2020-12-31 PROCEDURE — 94799 UNLISTED PULMONARY SVC/PX: CPT

## 2020-12-31 RX ORDER — POTASSIUM CHLORIDE 750 MG/1
40 CAPSULE, EXTENDED RELEASE ORAL AS NEEDED
Status: DISCONTINUED | OUTPATIENT
Start: 2020-12-31 | End: 2020-12-31 | Stop reason: HOSPADM

## 2020-12-31 RX ADMIN — POTASSIUM CHLORIDE 40 MEQ: 10 CAPSULE, COATED, EXTENDED RELEASE ORAL at 09:50

## 2020-12-31 RX ADMIN — SODIUM CHLORIDE 75 ML/HR: 9 INJECTION, SOLUTION INTRAVENOUS at 04:46

## 2020-12-31 RX ADMIN — INSULIN LISPRO 4 UNITS: 100 INJECTION, SOLUTION INTRAVENOUS; SUBCUTANEOUS at 11:32

## 2020-12-31 RX ADMIN — GABAPENTIN 300 MG: 300 CAPSULE ORAL at 06:16

## 2020-12-31 RX ADMIN — ASPIRIN 81 MG: 81 TABLET, FILM COATED ORAL at 08:32

## 2020-12-31 RX ADMIN — METFORMIN HYDROCHLORIDE 1000 MG: 1000 TABLET ORAL at 08:33

## 2020-12-31 RX ADMIN — INSULIN LISPRO 2 UNITS: 100 INJECTION, SOLUTION INTRAVENOUS; SUBCUTANEOUS at 08:32

## 2020-12-31 RX ADMIN — METOPROLOL TARTRATE 12.5 MG: 25 TABLET, FILM COATED ORAL at 08:32

## 2020-12-31 RX ADMIN — Medication 1 PATCH: at 08:35

## 2020-12-31 RX ADMIN — ACETAMINOPHEN 650 MG: 325 TABLET ORAL at 08:32

## 2020-12-31 RX ADMIN — HYDROCODONE BITARTRATE AND ACETAMINOPHEN 1 TABLET: 5; 325 TABLET ORAL at 02:50

## 2020-12-31 RX ADMIN — SODIUM CHLORIDE, PRESERVATIVE FREE 3 ML: 5 INJECTION INTRAVENOUS at 08:40

## 2020-12-31 RX ADMIN — VARENICLINE TARTRATE 1 MG: 0.5 TABLET, FILM COATED ORAL at 08:33

## 2020-12-31 RX ADMIN — PANTOPRAZOLE SODIUM 40 MG: 40 TABLET, DELAYED RELEASE ORAL at 06:16

## 2020-12-31 RX ADMIN — GLIPIZIDE 10 MG: 10 TABLET ORAL at 08:33

## 2020-12-31 RX ADMIN — CLOPIDOGREL BISULFATE 75 MG: 75 TABLET ORAL at 08:32

## 2021-01-01 ENCOUNTER — TELEPHONE (OUTPATIENT)
Dept: NEUROSURGERY | Facility: CLINIC | Age: 56
End: 2021-01-01

## 2021-01-01 ENCOUNTER — TREATMENT (OUTPATIENT)
Dept: PHYSICAL THERAPY | Facility: CLINIC | Age: 56
End: 2021-01-01

## 2021-01-01 ENCOUNTER — TELEPHONE (OUTPATIENT)
Dept: FAMILY MEDICINE CLINIC | Facility: CLINIC | Age: 56
End: 2021-01-01

## 2021-01-01 ENCOUNTER — OFFICE VISIT (OUTPATIENT)
Dept: FAMILY MEDICINE CLINIC | Facility: CLINIC | Age: 56
End: 2021-01-01

## 2021-01-01 ENCOUNTER — APPOINTMENT (OUTPATIENT)
Dept: GENERAL RADIOLOGY | Facility: HOSPITAL | Age: 56
End: 2021-01-01

## 2021-01-01 ENCOUNTER — HOSPITAL ENCOUNTER (OUTPATIENT)
Dept: GENERAL RADIOLOGY | Facility: HOSPITAL | Age: 56
Discharge: HOME | End: 2021-04-22

## 2021-01-01 ENCOUNTER — HOSPITAL ENCOUNTER (OUTPATIENT)
Dept: CARDIOLOGY | Facility: HOSPITAL | Age: 56
Discharge: HOME | End: 2021-12-08
Admitting: NURSE PRACTITIONER

## 2021-01-01 ENCOUNTER — OFFICE VISIT (OUTPATIENT)
Dept: CARDIOLOGY | Facility: HOSPITAL | Age: 56
End: 2021-01-01

## 2021-01-01 ENCOUNTER — HOSPITAL ENCOUNTER (OUTPATIENT)
Dept: CARDIOLOGY | Facility: HOSPITAL | Age: 56
Discharge: HOME | End: 2021-11-23

## 2021-01-01 ENCOUNTER — HOSPITAL ENCOUNTER (OUTPATIENT)
Dept: CARDIOLOGY | Facility: HOSPITAL | Age: 56
Discharge: HOME | End: 2021-04-22

## 2021-01-01 ENCOUNTER — HOSPITAL ENCOUNTER (OUTPATIENT)
Dept: MRI IMAGING | Facility: HOSPITAL | Age: 56
Discharge: HOME | End: 2021-04-22

## 2021-01-01 ENCOUNTER — OFFICE VISIT (OUTPATIENT)
Dept: NEUROSURGERY | Facility: CLINIC | Age: 56
End: 2021-01-01

## 2021-01-01 ENCOUNTER — HOSPITAL ENCOUNTER (EMERGENCY)
Facility: HOSPITAL | Age: 56
Discharge: HOME | End: 2021-11-19
Attending: EMERGENCY MEDICINE | Admitting: EMERGENCY MEDICINE

## 2021-01-01 ENCOUNTER — DOCUMENTATION (OUTPATIENT)
Dept: PHYSICAL THERAPY | Facility: CLINIC | Age: 56
End: 2021-01-01

## 2021-01-01 VITALS
HEIGHT: 62 IN | BODY MASS INDEX: 30.33 KG/M2 | TEMPERATURE: 97.1 F | DIASTOLIC BLOOD PRESSURE: 70 MMHG | WEIGHT: 164.8 LBS | SYSTOLIC BLOOD PRESSURE: 110 MMHG

## 2021-01-01 VITALS
RESPIRATION RATE: 15 BRPM | WEIGHT: 165.6 LBS | DIASTOLIC BLOOD PRESSURE: 68 MMHG | OXYGEN SATURATION: 98 % | SYSTOLIC BLOOD PRESSURE: 110 MMHG | HEART RATE: 96 BPM | BODY MASS INDEX: 30.47 KG/M2 | TEMPERATURE: 96.9 F | HEIGHT: 62 IN

## 2021-01-01 VITALS
HEART RATE: 92 BPM | DIASTOLIC BLOOD PRESSURE: 76 MMHG | WEIGHT: 161.16 LBS | HEIGHT: 62 IN | BODY MASS INDEX: 29.66 KG/M2 | SYSTOLIC BLOOD PRESSURE: 126 MMHG

## 2021-01-01 VITALS
OXYGEN SATURATION: 98 % | HEIGHT: 62 IN | SYSTOLIC BLOOD PRESSURE: 128 MMHG | TEMPERATURE: 98.6 F | WEIGHT: 170 LBS | BODY MASS INDEX: 31.28 KG/M2 | DIASTOLIC BLOOD PRESSURE: 64 MMHG | RESPIRATION RATE: 18 BRPM | HEART RATE: 110 BPM

## 2021-01-01 VITALS
SYSTOLIC BLOOD PRESSURE: 126 MMHG | WEIGHT: 161.13 LBS | OXYGEN SATURATION: 97 % | BODY MASS INDEX: 29.65 KG/M2 | HEART RATE: 90 BPM | TEMPERATURE: 96.6 F | HEIGHT: 62 IN | RESPIRATION RATE: 16 BRPM | DIASTOLIC BLOOD PRESSURE: 61 MMHG

## 2021-01-01 VITALS
BODY MASS INDEX: 30 KG/M2 | DIASTOLIC BLOOD PRESSURE: 70 MMHG | WEIGHT: 163 LBS | HEIGHT: 62 IN | TEMPERATURE: 97.5 F | SYSTOLIC BLOOD PRESSURE: 130 MMHG

## 2021-01-01 DIAGNOSIS — I10 BENIGN ESSENTIAL HYPERTENSION: ICD-10-CM

## 2021-01-01 DIAGNOSIS — G89.29 CHRONIC BILATERAL LOW BACK PAIN WITHOUT SCIATICA: ICD-10-CM

## 2021-01-01 DIAGNOSIS — Z78.9 IMPAIRED MOBILITY AND ADLS: Primary | ICD-10-CM

## 2021-01-01 DIAGNOSIS — M54.9 BACK PAIN WITH HISTORY OF SPINAL SURGERY: ICD-10-CM

## 2021-01-01 DIAGNOSIS — M54.42 ACUTE BILATERAL LOW BACK PAIN WITH BILATERAL SCIATICA: ICD-10-CM

## 2021-01-01 DIAGNOSIS — Z72.0 TOBACCO ABUSE: ICD-10-CM

## 2021-01-01 DIAGNOSIS — M54.42 ACUTE BILATERAL LOW BACK PAIN WITH BILATERAL SCIATICA: Primary | ICD-10-CM

## 2021-01-01 DIAGNOSIS — R42 DIZZINESS: ICD-10-CM

## 2021-01-01 DIAGNOSIS — R29.898 DECREASED GRIP STRENGTH OF RIGHT HAND: ICD-10-CM

## 2021-01-01 DIAGNOSIS — Z98.890 HISTORY OF RIGHT-SIDED CAROTID ENDARTERECTOMY: ICD-10-CM

## 2021-01-01 DIAGNOSIS — E78.5 HYPERLIPIDEMIA, UNSPECIFIED HYPERLIPIDEMIA TYPE: ICD-10-CM

## 2021-01-01 DIAGNOSIS — Z98.890 BACK PAIN WITH HISTORY OF SPINAL SURGERY: ICD-10-CM

## 2021-01-01 DIAGNOSIS — R00.2 PALPITATIONS: ICD-10-CM

## 2021-01-01 DIAGNOSIS — I63.9 CEREBROVASCULAR ACCIDENT (CVA), UNSPECIFIED MECHANISM (HCC): ICD-10-CM

## 2021-01-01 DIAGNOSIS — I65.21 ASYMPTOMATIC CAROTID ARTERY STENOSIS WITHOUT INFARCTION, RIGHT: ICD-10-CM

## 2021-01-01 DIAGNOSIS — R07.9 CHEST PAIN, UNSPECIFIED TYPE: Primary | ICD-10-CM

## 2021-01-01 DIAGNOSIS — E11.9 TYPE 2 DIABETES MELLITUS WITHOUT COMPLICATION, WITHOUT LONG-TERM CURRENT USE OF INSULIN (HCC): ICD-10-CM

## 2021-01-01 DIAGNOSIS — I63.9 CEREBROVASCULAR ACCIDENT (CVA), UNSPECIFIED MECHANISM (HCC): Primary | ICD-10-CM

## 2021-01-01 DIAGNOSIS — M54.50 CHRONIC BILATERAL LOW BACK PAIN WITHOUT SCIATICA: ICD-10-CM

## 2021-01-01 DIAGNOSIS — S70.01XA CONTUSION OF RIGHT HIP, INITIAL ENCOUNTER: ICD-10-CM

## 2021-01-01 DIAGNOSIS — R27.8 DECREASED COORDINATION: ICD-10-CM

## 2021-01-01 DIAGNOSIS — Z74.09 IMPAIRED FUNCTIONAL MOBILITY, BALANCE, GAIT, AND ENDURANCE: Primary | ICD-10-CM

## 2021-01-01 DIAGNOSIS — Z74.09 IMPAIRED MOBILITY AND ADLS: Primary | ICD-10-CM

## 2021-01-01 DIAGNOSIS — I65.23 BILATERAL CAROTID ARTERY STENOSIS: ICD-10-CM

## 2021-01-01 DIAGNOSIS — R07.9 NONSPECIFIC CHEST PAIN: ICD-10-CM
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
## 2021-01-01 DIAGNOSIS — M54.41 ACUTE BILATERAL LOW BACK PAIN WITH BILATERAL SCIATICA: Primary | ICD-10-CM

## 2021-01-01 DIAGNOSIS — E11.9 TYPE 2 DIABETES MELLITUS WITHOUT COMPLICATION, WITHOUT LONG-TERM CURRENT USE OF INSULIN (HCC): Primary | ICD-10-CM

## 2021-01-01 DIAGNOSIS — Z71.6 TOBACCO ABUSE COUNSELING: ICD-10-CM

## 2021-01-01 DIAGNOSIS — Z74.09 IMPAIRED FUNCTIONAL MOBILITY, BALANCE, GAIT, AND ENDURANCE: ICD-10-CM

## 2021-01-01 DIAGNOSIS — M54.41 ACUTE BILATERAL LOW BACK PAIN WITH BILATERAL SCIATICA: ICD-10-CM

## 2021-01-01 DIAGNOSIS — F41.9 ANXIETY: ICD-10-CM

## 2021-01-01 DIAGNOSIS — R07.9 CHEST PAIN, UNSPECIFIED TYPE: ICD-10-CM

## 2021-01-01 DIAGNOSIS — I77.9 BILATERAL CAROTID ARTERY DISEASE, UNSPECIFIED TYPE (HCC): ICD-10-CM

## 2021-01-01 DIAGNOSIS — I10 PRIMARY HYPERTENSION: ICD-10-CM

## 2021-01-01 DIAGNOSIS — F11.90 CHRONIC, CONTINUOUS USE OF OPIOIDS: Primary | Chronic | ICD-10-CM

## 2021-01-01 DIAGNOSIS — M25.551 RIGHT HIP PAIN: ICD-10-CM

## 2021-01-01 DIAGNOSIS — I65.22 SYMPTOMATIC CAROTID ARTERY STENOSIS, LEFT: ICD-10-CM

## 2021-01-01 LAB
ALBUMIN SERPL-MCNC: 4.5 G/DL (ref 3.5–5.2)
ALBUMIN/GLOB SERPL: 1.5 G/DL
ALP SERPL-CCNC: 131 U/L (ref 39–117)
ALT SERPL W P-5'-P-CCNC: 16 U/L (ref 1–33)
ANION GAP SERPL CALCULATED.3IONS-SCNC: 11 MMOL/L (ref 5–15)
AST SERPL-CCNC: 18 U/L (ref 1–32)
BACTERIA UR QL AUTO: ABNORMAL /HPF
BASOPHILS # BLD AUTO: 0.05 10*3/MM3 (ref 0–0.2)
BASOPHILS NFR BLD AUTO: 0.4 % (ref 0–1.5)
BH CV ECHO MEAS - BSA(HAYCOCK): 1.8 M^2
BH CV ECHO MEAS - BSA: 1.8 M^2
BH CV ECHO MEAS - BZI_BMI: 29.8 KILOGRAMS/M^2
BH CV ECHO MEAS - BZI_METRIC_HEIGHT: 157.5 CM
BH CV ECHO MEAS - BZI_METRIC_WEIGHT: 73.9 KG
BH CV LEFT CCA HIDDEN LRR: 1 CM/S
BH CV REST NUCLEAR ISOTOPE DOSE: 9.9 MCI
BH CV STRESS BP STAGE 2: NORMAL
BH CV STRESS BP STAGE 3: NORMAL
BH CV STRESS COMMENTS STAGE 1: NORMAL
BH CV STRESS DOSE REGADENOSON STAGE 1: 0.4
BH CV STRESS DURATION MIN STAGE 1: 1
BH CV STRESS DURATION MIN STAGE 2: 1
BH CV STRESS DURATION MIN STAGE 3: 1
BH CV STRESS DURATION MIN STAGE 4: 1
BH CV STRESS DURATION SEC STAGE 1: 0
BH CV STRESS DURATION SEC STAGE 2: 0
BH CV STRESS DURATION SEC STAGE 3: 0
BH CV STRESS DURATION SEC STAGE 4: 0
BH CV STRESS HR STAGE 1: 93
BH CV STRESS HR STAGE 2: 103
BH CV STRESS HR STAGE 3: 99
BH CV STRESS HR STAGE 4: 99
BH CV STRESS NUCLEAR ISOTOPE DOSE: 33 MCI
BH CV STRESS O2 STAGE 1: 92
BH CV STRESS O2 STAGE 2: 96
BH CV STRESS O2 STAGE 3: 96
BH CV STRESS O2 STAGE 4: 95
BH CV STRESS PROTOCOL 1: NORMAL
BH CV STRESS RECOVERY BP: NORMAL MMHG
BH CV STRESS RECOVERY HR: 98 BPM
BH CV STRESS RECOVERY O2: 93 %
BH CV STRESS STAGE 1: 1
BH CV STRESS STAGE 2: 2
BH CV STRESS STAGE 3: 3
BH CV STRESS STAGE 4: 4
BH CV VAS CAROTID LEFT DISTAL STENT EDV: 44 CM/S
BH CV VAS CAROTID LEFT DISTAL STENT: 126 CM/S
BH CV VAS CAROTID LEFT DISTAL TO STENT EDV: 38 CM/S
BH CV VAS CAROTID LEFT DISTAL TO STENT: 113 CM/S
BH CV VAS CAROTID LEFT MID STENT EDV: 26 CM/S
BH CV VAS CAROTID LEFT MID STENT: 89 CM/S
BH CV VAS CAROTID LEFT PROXIMAL STENT EDV: 22 CM/S
BH CV VAS CAROTID LEFT PROXIMAL STENT: 67 CM/S
BH CV VAS CAROTID LEFT PROXIMAL TO STENT: 65 CM/S
BH CV VAS CAROTID LEFT STENT NATIVE VESSEL PROXIMAL ED: 23 CM/S
BH CV XLRA MEAS LEFT DIST CCA EDV: 25.1 CM/SEC
BH CV XLRA MEAS LEFT DIST CCA PSV: 73.1 CM/SEC
BH CV XLRA MEAS LEFT DIST ICA EDV: 37.7 CM/SEC
BH CV XLRA MEAS LEFT DIST ICA PSV: 118.6 CM/SEC
BH CV XLRA MEAS LEFT MID CCA EDV: 20.4 CM/SEC
BH CV XLRA MEAS LEFT MID CCA PSV: 58.9 CM/SEC
BH CV XLRA MEAS LEFT MID ICA EDV: 49.5 CM/SEC
BH CV XLRA MEAS LEFT MID ICA PSV: 140.6 CM/SEC
BH CV XLRA MEAS LEFT PROX CCA EDV: 22 CM/SEC
BH CV XLRA MEAS LEFT PROX CCA PSV: 84.1 CM/SEC
BH CV XLRA MEAS LEFT PROX ECA EDV: 20.1 CM/SEC
BH CV XLRA MEAS LEFT PROX ECA PSV: 147.1 CM/SEC
BH CV XLRA MEAS LEFT PROX ICA EDV: 24.4 CM/SEC
BH CV XLRA MEAS LEFT PROX ICA PSV: 71.5 CM/SEC
BH CV XLRA MEAS LEFT PROX SCLA EDV: 0.98 CM/SEC
BH CV XLRA MEAS LEFT PROX SCLA PSV: 176.8 CM/SEC
BH CV XLRA MEAS LEFT VERTEBRAL A EDV: 27.9 CM/SEC
BH CV XLRA MEAS LEFT VERTEBRAL A PSV: 88.2 CM/SEC
BH CV XLRA MEAS RIGHT DIST CCA EDV: 20.4 CM/SEC
BH CV XLRA MEAS RIGHT DIST CCA PSV: 77.7 CM/SEC
BH CV XLRA MEAS RIGHT DIST ICA EDV: 37.7 CM/SEC
BH CV XLRA MEAS RIGHT DIST ICA PSV: 98.7 CM/SEC
BH CV XLRA MEAS RIGHT MID CCA EDV: 21.5 CM/SEC
BH CV XLRA MEAS RIGHT MID CCA PSV: 93.2 CM/SEC
BH CV XLRA MEAS RIGHT MID ICA EDV: 41.3 CM/SEC
BH CV XLRA MEAS RIGHT MID ICA PSV: 159.1 CM/SEC
BH CV XLRA MEAS RIGHT PROX CCA EDV: 21.6 CM/SEC
BH CV XLRA MEAS RIGHT PROX CCA PSV: 107.5 CM/SEC
BH CV XLRA MEAS RIGHT PROX ECA EDV: 123.3 CM/SEC
BH CV XLRA MEAS RIGHT PROX ECA PSV: 459.4 CM/SEC
BH CV XLRA MEAS RIGHT PROX ICA EDV: 19.6 CM/SEC
BH CV XLRA MEAS RIGHT PROX ICA PSV: 67.2 CM/SEC
BH CV XLRA MEAS RIGHT PROX SCLA EDV: 9.8 CM/SEC
BH CV XLRA MEAS RIGHT PROX SCLA PSV: 204.9 CM/SEC
BH CV XLRA MEAS RIGHT VERTEBRAL A EDV: 6 CM/SEC
BH CV XLRA MEAS RIGHT VERTEBRAL A PSV: 36.8 CM/SEC
BH CVPROX LEFT ICA HIDDEN LRR: 1 CM
BILIRUB SERPL-MCNC: 0.4 MG/DL (ref 0–1.2)
BILIRUB UR QL STRIP: NEGATIVE
BUN SERPL-MCNC: 23 MG/DL (ref 6–20)
BUN/CREAT SERPL: 26.4 (ref 7–25)
CALCIUM SPEC-SCNC: 9.8 MG/DL (ref 8.6–10.5)
CHLORIDE SERPL-SCNC: 101 MMOL/L (ref 98–107)
CLARITY UR: CLEAR
CO2 SERPL-SCNC: 26 MMOL/L (ref 22–29)
COLOR UR: YELLOW
CREAT BLDA-MCNC: 0.8 MG/DL (ref 0.6–1.3)
CREAT SERPL-MCNC: 0.87 MG/DL (ref 0.57–1)
DEPRECATED RDW RBC AUTO: 42.5 FL (ref 37–54)
EOSINOPHIL # BLD AUTO: 0.2 10*3/MM3 (ref 0–0.4)
EOSINOPHIL NFR BLD AUTO: 1.7 % (ref 0.3–6.2)
ERYTHROCYTE [DISTWIDTH] IN BLOOD BY AUTOMATED COUNT: 13.5 % (ref 12.3–15.4)
EXPIRATION DATE: ABNORMAL
FLUAV RNA RESP QL NAA+PROBE: NOT DETECTED
FLUBV RNA RESP QL NAA+PROBE: NOT DETECTED
GFR SERPL CREATININE-BSD FRML MDRD: 67 ML/MIN/1.73
GLOBULIN UR ELPH-MCNC: 3 GM/DL
GLUCOSE SERPL-MCNC: 216 MG/DL (ref 65–99)
GLUCOSE UR STRIP-MCNC: ABNORMAL MG/DL
HBA1C MFR BLD: 9.2 %
HCT VFR BLD AUTO: 51.8 % (ref 34–46.6)
HGB BLD-MCNC: 17.6 G/DL (ref 12–15.9)
HGB UR QL STRIP.AUTO: NEGATIVE
HOLD SPECIMEN: NORMAL
HYALINE CASTS UR QL AUTO: ABNORMAL /LPF
IMM GRANULOCYTES # BLD AUTO: 0.05 10*3/MM3 (ref 0–0.05)
IMM GRANULOCYTES NFR BLD AUTO: 0.4 % (ref 0–0.5)
KETONES UR QL STRIP: NEGATIVE
LEFT ARM BP: NORMAL MMHG
LEUKOCYTE ESTERASE UR QL STRIP.AUTO: ABNORMAL
LV EF NUC BP: 77 %
LYMPHOCYTES # BLD AUTO: 3.14 10*3/MM3 (ref 0.7–3.1)
LYMPHOCYTES NFR BLD AUTO: 27.4 % (ref 19.6–45.3)
Lab: ABNORMAL
MAGNESIUM SERPL-MCNC: 1.9 MG/DL (ref 1.6–2.6)
MAXIMAL PREDICTED HEART RATE: 163 BPM
MCH RBC QN AUTO: 29.3 PG (ref 26.6–33)
MCHC RBC AUTO-ENTMCNC: 34 G/DL (ref 31.5–35.7)
MCV RBC AUTO: 86.3 FL (ref 79–97)
MONOCYTES # BLD AUTO: 0.64 10*3/MM3 (ref 0.1–0.9)
MONOCYTES NFR BLD AUTO: 5.6 % (ref 5–12)
NEUTROPHILS NFR BLD AUTO: 64.5 % (ref 42.7–76)
NEUTROPHILS NFR BLD AUTO: 7.39 10*3/MM3 (ref 1.7–7)
NITRITE UR QL STRIP: NEGATIVE
NRBC BLD AUTO-RTO: 0 /100 WBC (ref 0–0.2)
PERCENT MAX PREDICTED HR: 63.19 %
PH UR STRIP.AUTO: 6.5 [PH] (ref 5–8)
PLATELET # BLD AUTO: 233 10*3/MM3 (ref 140–450)
PMV BLD AUTO: 11.1 FL (ref 6–12)
POTASSIUM SERPL-SCNC: 4 MMOL/L (ref 3.5–5.2)
PROT SERPL-MCNC: 7.5 G/DL (ref 6–8.5)
PROT UR QL STRIP: NEGATIVE
QT INTERVAL: 336 MS
QT INTERVAL: 390 MS
QTC INTERVAL: 450 MS
QTC INTERVAL: 455 MS
RBC # BLD AUTO: 6 10*6/MM3 (ref 3.77–5.28)
RBC # UR STRIP: ABNORMAL /HPF
REF LAB TEST METHOD: ABNORMAL
RIGHT ARM BP: NORMAL MMHG
SARS-COV-2 RNA RESP QL NAA+PROBE: NOT DETECTED
SODIUM SERPL-SCNC: 138 MMOL/L (ref 136–145)
SP GR UR STRIP: 1.01 (ref 1–1.03)
SQUAMOUS #/AREA URNS HPF: ABNORMAL /HPF
STRESS BASELINE BP: NORMAL MMHG
STRESS BASELINE HR: 94 BPM
STRESS O2 SAT REST: 92 %
STRESS PERCENT HR: 74 %
STRESS POST ESTIMATED WORKLOAD: 1 METS
STRESS POST EXERCISE DUR MIN: 4 MIN
STRESS POST EXERCISE DUR SEC: 0 SEC
STRESS POST O2 SAT PEAK: 96 %
STRESS POST PEAK BP: NORMAL MMHG
STRESS POST PEAK HR: 103 BPM
STRESS TARGET HR: 139 BPM
TROPONIN T SERPL-MCNC: <0.01 NG/ML (ref 0–0.03)
TROPONIN T SERPL-MCNC: <0.01 NG/ML (ref 0–0.03)
UROBILINOGEN UR QL STRIP: ABNORMAL
WBC # UR STRIP: ABNORMAL /HPF
WBC NRBC COR # BLD: 11.47 10*3/MM3 (ref 3.4–10.8)
WHOLE BLOOD HOLD SPECIMEN: NORMAL
WHOLE BLOOD HOLD SPECIMEN: NORMAL

## 2021-01-01 PROCEDURE — 78452 HT MUSCLE IMAGE SPECT MULT: CPT

## 2021-01-01 PROCEDURE — 97112 NEUROMUSCULAR REEDUCATION: CPT | Performed by: PHYSICAL THERAPIST

## 2021-01-01 PROCEDURE — 97110 THERAPEUTIC EXERCISES: CPT | Performed by: PHYSICAL THERAPIST

## 2021-01-01 PROCEDURE — 0 TECHNETIUM SESTAMIBI: Performed by: NURSE PRACTITIONER

## 2021-01-01 PROCEDURE — 99214 OFFICE O/P EST MOD 30 MIN: CPT | Performed by: NURSE PRACTITIONER

## 2021-01-01 PROCEDURE — 0 GADOBENATE DIMEGLUMINE 529 MG/ML SOLUTION: Performed by: PHYSICIAN ASSISTANT

## 2021-01-01 PROCEDURE — 99284 EMERGENCY DEPT VISIT MOD MDM: CPT

## 2021-01-01 PROCEDURE — 97530 THERAPEUTIC ACTIVITIES: CPT | Performed by: OCCUPATIONAL THERAPIST

## 2021-01-01 PROCEDURE — 99214 OFFICE O/P EST MOD 30 MIN: CPT | Performed by: PHYSICIAN ASSISTANT

## 2021-01-01 PROCEDURE — 78452 HT MUSCLE IMAGE SPECT MULT: CPT | Performed by: INTERNAL MEDICINE

## 2021-01-01 PROCEDURE — 82565 ASSAY OF CREATININE: CPT

## 2021-01-01 PROCEDURE — 93880 EXTRACRANIAL BILAT STUDY: CPT

## 2021-01-01 PROCEDURE — A9577 INJ MULTIHANCE: HCPCS | Performed by: PHYSICIAN ASSISTANT

## 2021-01-01 PROCEDURE — 93018 CV STRESS TEST I&R ONLY: CPT | Performed by: INTERNAL MEDICINE

## 2021-01-01 PROCEDURE — 84484 ASSAY OF TROPONIN QUANT: CPT

## 2021-01-01 PROCEDURE — 99214 OFFICE O/P EST MOD 30 MIN: CPT | Performed by: FAMILY MEDICINE

## 2021-01-01 PROCEDURE — 93005 ELECTROCARDIOGRAM TRACING: CPT | Performed by: EMERGENCY MEDICINE

## 2021-01-01 PROCEDURE — 71045 X-RAY EXAM CHEST 1 VIEW: CPT

## 2021-01-01 PROCEDURE — 93880 EXTRACRANIAL BILAT STUDY: CPT | Performed by: INTERNAL MEDICINE

## 2021-01-01 PROCEDURE — 87636 SARSCOV2 & INF A&B AMP PRB: CPT | Performed by: PHYSICIAN ASSISTANT

## 2021-01-01 PROCEDURE — 93270 REMOTE 30 DAY ECG REV/REPORT: CPT

## 2021-01-01 PROCEDURE — 93005 ELECTROCARDIOGRAM TRACING: CPT

## 2021-01-01 PROCEDURE — 83036 HEMOGLOBIN GLYCOSYLATED A1C: CPT | Performed by: FAMILY MEDICINE

## 2021-01-01 PROCEDURE — 97110 THERAPEUTIC EXERCISES: CPT | Performed by: OCCUPATIONAL THERAPIST

## 2021-01-01 PROCEDURE — 72158 MRI LUMBAR SPINE W/O & W/DYE: CPT

## 2021-01-01 PROCEDURE — 25010000002 REGADENOSON 0.4 MG/5ML SOLUTION: Performed by: NURSE PRACTITIONER

## 2021-01-01 PROCEDURE — A9500 TC99M SESTAMIBI: HCPCS | Performed by: NURSE PRACTITIONER

## 2021-01-01 PROCEDURE — 72120 X-RAY BEND ONLY L-S SPINE: CPT

## 2021-01-01 PROCEDURE — 84484 ASSAY OF TROPONIN QUANT: CPT | Performed by: PHYSICIAN ASSISTANT

## 2021-01-01 PROCEDURE — 81001 URINALYSIS AUTO W/SCOPE: CPT

## 2021-01-01 PROCEDURE — 85025 COMPLETE CBC W/AUTO DIFF WBC: CPT

## 2021-01-01 PROCEDURE — 97530 THERAPEUTIC ACTIVITIES: CPT | Performed by: PHYSICAL THERAPIST

## 2021-01-01 PROCEDURE — 93017 CV STRESS TEST TRACING ONLY: CPT

## 2021-01-01 PROCEDURE — 73502 X-RAY EXAM HIP UNI 2-3 VIEWS: CPT

## 2021-01-01 PROCEDURE — 80053 COMPREHEN METABOLIC PANEL: CPT

## 2021-01-01 PROCEDURE — 83735 ASSAY OF MAGNESIUM: CPT

## 2021-01-01 RX ORDER — BACLOFEN 20 MG/1
20 TABLET ORAL 3 TIMES DAILY
Qty: 60 TABLET | Refills: 5 | Status: ON HOLD | OUTPATIENT
Start: 2021-01-01 | End: 2022-01-01 | Stop reason: SDUPTHER

## 2021-01-01 RX ORDER — OMEPRAZOLE 40 MG/1
40 CAPSULE, DELAYED RELEASE ORAL DAILY
Qty: 30 CAPSULE | Refills: 11 | Status: SHIPPED | OUTPATIENT
Start: 2021-01-01 | End: 2022-01-01 | Stop reason: HOSPADM

## 2021-01-01 RX ORDER — DIAZEPAM 10 MG/1
TABLET ORAL
Qty: 75 TABLET | Refills: 5 | Status: ON HOLD | OUTPATIENT
Start: 2021-01-01 | End: 2022-01-01 | Stop reason: SDUPTHER

## 2021-01-01 RX ORDER — ALBUTEROL SULFATE 90 UG/1
2 AEROSOL, METERED RESPIRATORY (INHALATION) EVERY 4 HOURS PRN
Qty: 6.7 G | Refills: 11 | Status: SHIPPED | OUTPATIENT
Start: 2021-01-01

## 2021-01-01 RX ORDER — HYDROCODONE BITARTRATE AND ACETAMINOPHEN 10; 325 MG/1; MG/1
1 TABLET ORAL ONCE
Status: COMPLETED | OUTPATIENT
Start: 2021-01-01 | End: 2021-01-01

## 2021-01-01 RX ORDER — DIAZEPAM 10 MG/1
TABLET ORAL
Qty: 45 TABLET | Refills: 5 | Status: SHIPPED | OUTPATIENT
Start: 2021-01-01 | End: 2021-01-01 | Stop reason: SDUPTHER

## 2021-01-01 RX ORDER — OMEPRAZOLE 40 MG/1
40 CAPSULE, DELAYED RELEASE ORAL DAILY
Qty: 30 CAPSULE | Refills: 11 | Status: SHIPPED | OUTPATIENT
Start: 2021-01-01 | End: 2021-01-01

## 2021-01-01 RX ORDER — BLOOD-GLUCOSE METER
KIT MISCELLANEOUS
Qty: 90 EACH | Refills: 5 | Status: ON HOLD | OUTPATIENT
Start: 2021-01-01 | End: 2022-01-01

## 2021-01-01 RX ORDER — NICOTINE 21 MG/24HR
1 PATCH, TRANSDERMAL 24 HOURS TRANSDERMAL EVERY 24 HOURS
Qty: 14 PATCH | Refills: 5 | Status: SHIPPED | OUTPATIENT
Start: 2021-01-01 | End: 2022-01-01 | Stop reason: HOSPADM

## 2021-01-01 RX ORDER — CLOPIDOGREL BISULFATE 75 MG/1
TABLET ORAL
Qty: 60 TABLET | Refills: 3 | Status: SHIPPED | OUTPATIENT
Start: 2021-01-01 | End: 2022-01-01

## 2021-01-01 RX ORDER — SODIUM CHLORIDE 0.9 % (FLUSH) 0.9 %
10 SYRINGE (ML) INJECTION AS NEEDED
Status: DISCONTINUED | OUTPATIENT
Start: 2021-01-01 | End: 2021-01-01 | Stop reason: HOSPADM

## 2021-01-01 RX ORDER — TIZANIDINE 4 MG/1
TABLET ORAL DAILY
COMMUNITY
Start: 2021-03-08 | End: 2021-01-01

## 2021-01-01 RX ORDER — DIAZEPAM 10 MG/1
10 TABLET ORAL EVERY 8 HOURS PRN
Qty: 75 TABLET | Refills: 2 | Status: SHIPPED | OUTPATIENT
Start: 2021-01-01 | End: 2021-01-01 | Stop reason: SDUPTHER

## 2021-01-01 RX ORDER — ATORVASTATIN CALCIUM 80 MG/1
80 TABLET, FILM COATED ORAL NIGHTLY
Qty: 90 TABLET | Refills: 3 | Status: SHIPPED | OUTPATIENT
Start: 2021-01-01

## 2021-01-01 RX ADMIN — GADOBENATE DIMEGLUMINE 15 ML: 529 INJECTION, SOLUTION INTRAVENOUS at 13:21

## 2021-01-01 RX ADMIN — TECHNETIUM TC 99M SESTAMIBI 1 DOSE: 1 INJECTION INTRAVENOUS at 12:58

## 2021-01-01 RX ADMIN — SODIUM CHLORIDE 500 ML: 9 INJECTION, SOLUTION INTRAVENOUS at 21:23

## 2021-01-01 RX ADMIN — HYDROCODONE BITARTRATE AND ACETAMINOPHEN 1 TABLET: 10; 325 TABLET ORAL at 21:23

## 2021-01-01 RX ADMIN — REGADENOSON 0.4 MG: 0.08 INJECTION, SOLUTION INTRAVENOUS at 14:38

## 2021-01-01 RX ADMIN — TECHNETIUM TC 99M SESTAMIBI 1 DOSE: 1 INJECTION INTRAVENOUS at 14:40

## 2021-01-04 ENCOUNTER — TRANSITIONAL CARE MANAGEMENT TELEPHONE ENCOUNTER (OUTPATIENT)
Dept: CALL CENTER | Facility: HOSPITAL | Age: 56
End: 2021-01-04

## 2021-01-04 NOTE — OUTREACH NOTE
Call Center TCM Note      Responses   [facility] patient discharged from?  [place]   Does the patient have one of the following disease processes/diagnoses(primary or secondary)?  General Surgery   TCM attempt successful?  Yes   Call start time  1707   Call end time  1713   Discharge diagnosis  CAROTID ENDARTERECTOMY   Is patient permission given to speak with other caregiver?  Yes   List who call center can speak with  spouse, Faheem    Person spoke with today (if not patient) and relationship  patient   Meds reviewed with patient/caregiver?  Yes   Is the patient having any side effects they believe may be caused by any medication additions or changes?  No   Does the patient have all medications related to this admission filled (includes all antibiotics, pain medications, etc.)  Yes   Is the patient taking all medications as directed (includes completed medication regime)?  Yes   Does the patient have a follow up appointment scheduled with their surgeon?  Yes   Has the patient kept scheduled appointments due by today?  N/A   Comments  PCP Dr Peters. Hospital follow up scheduled for Wed 1/6  1030am   Has home health visited the patient within 72 hours of discharge?  N/A   Psychosocial issues?  No   Did the patient receive a copy of their discharge instructions?  Yes   Nursing interventions  Reviewed instructions with patient   What is the patient's perception of their health status since discharge?  Improving   Nursing interventions  Nurse provided patient education   Is the patient /caregiver able to teach back basic post-op care?  Continue use of incentive spirometry at least 1 week post discharge, Practice 'cough and deep breath', Keep incision areas clean,dry and protected   Is the patient/caregiver able to teach back signs and symptoms of incisional infection?  Increased redness, swelling or pain at the incisonal site, Increased drainage or bleeding, Incisional warmth, Pus or odor from incision, Fever    Is the patient/caregiver able to teach back steps to recovery at home?  Set small, achievable goals for return to baseline health, Rest and rebuild strength, gradually increase activity   Is the patient/caregiver able to teach back the hierarchy of who to call/visit for symptoms/problems? PCP, Specialist, Home health nurse, Urgent Care, ED, 911  Yes   TCM call completed?  Yes   Wrap up additional comments  Denies any new questions or concerns today.           Lupe Mata RN    1/4/2021, 17:13 EST

## 2021-01-04 NOTE — OUTREACH NOTE
Call Center TCM Note      Responses   [facility] patient discharged from?  [place]   Does the patient have one of the following disease processes/diagnoses(primary or secondary)?  General Surgery   TCM attempt successful?  No   Unsuccessful attempts  Attempt 1 [Outdated verbal release]          Lupe Mata RN    1/4/2021, 12:08 EST

## 2021-01-06 ENCOUNTER — OFFICE VISIT (OUTPATIENT)
Dept: FAMILY MEDICINE CLINIC | Facility: CLINIC | Age: 56
End: 2021-01-06

## 2021-01-06 VITALS
TEMPERATURE: 97.8 F | HEART RATE: 92 BPM | BODY MASS INDEX: 30.23 KG/M2 | OXYGEN SATURATION: 98 % | DIASTOLIC BLOOD PRESSURE: 80 MMHG | SYSTOLIC BLOOD PRESSURE: 120 MMHG | HEIGHT: 62 IN

## 2021-01-06 DIAGNOSIS — E11.9 TYPE 2 DIABETES MELLITUS WITHOUT COMPLICATION, WITHOUT LONG-TERM CURRENT USE OF INSULIN (HCC): ICD-10-CM

## 2021-01-06 DIAGNOSIS — Z98.890 HISTORY OF RIGHT-SIDED CAROTID ENDARTERECTOMY: Primary | ICD-10-CM

## 2021-01-06 DIAGNOSIS — R09.81 SINUS CONGESTION: ICD-10-CM

## 2021-01-06 PROCEDURE — 99213 OFFICE O/P EST LOW 20 MIN: CPT | Performed by: FAMILY MEDICINE

## 2021-01-07 NOTE — ANESTHESIA POSTPROCEDURE EVALUATION
Patient: Raquel Espitia    Procedure Summary     Date: 12/30/20 Room / [facility]:  [facility]    Anesthesia Start: 0726 Anesthesia Stop: 1004    Procedure: CAROTID ENDARTERECTOMY WITH EEG RIGHT (Right Neck) Diagnosis:       Carotid stenosis, asymptomatic, right      (Carotid stenosis, asymptomatic, right [I65.21])    Surgeon: Gregg Patrick MD Provider: Erwin Robledo Jr., MD    Anesthesia Type: general ASA Status: 3          Anesthesia Type: general    Vitals  Vitals Value Taken Time   BP 95/69 12/30/20 1230   Temp 97.4 °F (36.3 °C) 12/30/20 1230   Pulse 76 12/30/20 1230   Resp 18 12/30/20 1230   SpO2 95 % 12/30/20 1230           Anesthesia Post Evaluation

## 2021-01-10 NOTE — PROGRESS NOTES
Subjective   Raquel Espitia is a 55 y.o. female    Chief Complaint    Status post right carotid endarterectomy  Diabetes mellitus  Sinus congestion    History of Present Illness  Patient presents today for hospital follow-up after recent right carotid endarterectomy.  Her surgery apparently went well with no complications.  She has an upcoming follow-up with her surgeon.  We will check her wound here today but she has had no complications by her history.  Diabetes remains under relatively good control.  She is monitoring her blood sugars and they have been stable although slightly elevated.  Her major concern today is that of sinus congestion with sinus pressure and nasal congestion noted.  She is asking for a decongestant.    The following portions of the patient's history were reviewed and updated as appropriate: allergies, current medications, past social history and problem list    Review of Systems   Constitutional: Negative for appetite change, diaphoresis, fatigue and unexpected weight change.   HENT: Positive for congestion, postnasal drip and sinus pressure. Negative for mouth sores, nosebleeds, sore throat and trouble swallowing.    Eyes: Negative for visual disturbance.   Respiratory: Negative for chest tightness and shortness of breath.    Cardiovascular: Negative for chest pain, palpitations and leg swelling.   Gastrointestinal: Negative for diarrhea, nausea and vomiting.   Endocrine: Negative for polydipsia, polyphagia and polyuria.   Skin: Negative for color change.   Neurological: Negative for dizziness, weakness, light-headedness and numbness.       Objective     Vitals:    01/06/21 1024   BP: 120/80   Pulse: 92   Temp: 97.8 °F (36.6 °C)   SpO2: 98%       Physical Exam  Vitals signs and nursing note reviewed.   Constitutional:       Appearance: She is well-developed. She is obese. She is not diaphoretic.   HENT:      Head: Atraumatic.      Right Ear: Tympanic membrane and ear canal normal.       Left Ear: Tympanic membrane and ear canal normal.   Neck:      Musculoskeletal: Neck supple.      Thyroid: No thyromegaly.      Vascular: No JVD.   Cardiovascular:      Rate and Rhythm: Normal rate and regular rhythm.      Heart sounds: Normal heart sounds.   Pulmonary:      Effort: Pulmonary effort is normal.      Breath sounds: Normal breath sounds.   Abdominal:      General: Bowel sounds are normal.      Palpations: Abdomen is soft.   Lymphadenopathy:      Cervical: No cervical adenopathy.   Skin:     General: Skin is warm and dry.      Comments: Right neck surgical wound with Steri-Strips in place with no erythema, no drainage, no odor.   Neurological:      Mental Status: She is alert.      Sensory: No sensory deficit.         Assessment/Plan   Problems Addressed this Visit     None      Diagnoses    None.

## 2021-01-11 ENCOUNTER — READMISSION MANAGEMENT (OUTPATIENT)
Dept: CALL CENTER | Facility: HOSPITAL | Age: 56
End: 2021-01-11

## 2021-01-11 NOTE — OUTREACH NOTE
General Surgery Week 2 Survey      Responses   [facility] patient discharged from?  [place]   Does the patient have one of the following disease processes/diagnoses(primary or secondary)?  General Surgery   Week 2 attempt successful?  Yes   Call start time  1006   Call end time  1008   Meds reviewed with patient/caregiver?  Yes   Is the patient taking all medications as directed (includes completed medication regime)?  Yes   Does the patient have a follow up appointment scheduled with their surgeon?  Yes   Has the patient kept scheduled appointments due by today?  N/A   What is the patient's perception of their health status since discharge?  Improving   Week 2 call completed?  Yes   Wrap up additional comments   Pt reports doing well. Pt will see surgeon next week.          Madai Bautista RN

## 2021-01-12 ENCOUNTER — TREATMENT (OUTPATIENT)
Dept: PHYSICAL THERAPY | Facility: CLINIC | Age: 56
End: 2021-01-12

## 2021-01-12 DIAGNOSIS — Z74.09 IMPAIRED FUNCTIONAL MOBILITY, BALANCE, GAIT, AND ENDURANCE: Primary | ICD-10-CM

## 2021-01-12 DIAGNOSIS — Z78.9 IMPAIRED MOBILITY AND ADLS: Primary | ICD-10-CM

## 2021-01-12 DIAGNOSIS — R29.898 DECREASED GRIP STRENGTH OF RIGHT HAND: ICD-10-CM

## 2021-01-12 DIAGNOSIS — Z74.09 IMPAIRED MOBILITY AND ADLS: Primary | ICD-10-CM

## 2021-01-12 DIAGNOSIS — R27.8 DECREASED COORDINATION: ICD-10-CM

## 2021-01-12 DIAGNOSIS — I63.9 CEREBROVASCULAR ACCIDENT (CVA), UNSPECIFIED MECHANISM (HCC): ICD-10-CM

## 2021-01-12 PROCEDURE — 97112 NEUROMUSCULAR REEDUCATION: CPT | Performed by: PHYSICAL THERAPIST

## 2021-01-12 PROCEDURE — 97110 THERAPEUTIC EXERCISES: CPT | Performed by: OCCUPATIONAL THERAPIST

## 2021-01-12 PROCEDURE — 97530 THERAPEUTIC ACTIVITIES: CPT | Performed by: OCCUPATIONAL THERAPIST

## 2021-01-12 PROCEDURE — 97110 THERAPEUTIC EXERCISES: CPT | Performed by: PHYSICAL THERAPIST

## 2021-01-12 NOTE — PROGRESS NOTES
Occupational Therapy Daily Progress Note  Visit: 3  Date of Initial Visit: Type: THERAPY  Noted: 12/15/2020      Patient: Raquel Espitia   : 1965  Diagnosis/ICD-10 Code:  Impaired mobility and ADLs [Z74.09, Z78.9]  Referring practitioner: Vikram Marie*  Date of Initial Visit: Type: THERAPY  Noted: 12/15/2020  Today's Date: 2021  Patient seen for 3 sessions      Subjective:   Patient reports: Pt underwent R CEA and d/c'ed 20 and has been released from any restrictions. Pt notes only minor discomfort in R wrist from art line with pressure and wrist extension.   Pain: 0/10  Subjective Questionnaire: n/a  Clinical Progress: improved  Home Program Compliance: Yes  Treatment has included: therapeutic exercise and therapeutic activity    Subjective   Objective     OT Neuro         OT Exercises     Row Name 21 0917 21 0900          Precautions    Existing Precautions/Restrictions  fall  -ST  --        Exercise 1    Exercise Name 1  warm-up w/UBE L 3.5  -ST  warm up w/neural priming using NuStep L6  -ST     Time (Minutes) 1  6  -ST  --        Exercise 2    Exercise Name 2  OH press  -ST  --     Equipment 2  Dowel  -ST  --     Weights/Plates 2  3  -ST  --     Sets 2  3  -ST  --     Reps 2  10  -ST  --        Exercise 3    Exercise Name 3  CP w/focused scap retraction  -ST  --     Equipment 3  Dowel  -ST  --     Weights/Plates 3  3  -ST  --     Sets 3  2  -ST  --     Reps 3  10  -ST  --        Exercise 4    Exercise Name 4  loaded end pronation/supination R UE  -ST  --     Equipment 4  Dowel  -ST  --     Weights/Plates 4  2  -ST  --     Sets 4  2  -ST  --     Reps 4  10  -ST  --        Exercise 5    Exercise Name 5  pronation/supination red flexi-bar  -ST  --     Sets 5  2  -ST  --     Reps 5  10  -ST  --        Exercise 6    Exercise Name 6  isolated ROM and flexibility R hand, each digit extension, abd/adduction and PIP flexion  -ST  --        Exercise 7    Exercise Name 7  isolated  opposition R hand   -ST  --     Reps 7  5  -ST  --        Exercise 8    Exercise Name 8  isolated add/abd R hand each digit   -ST  --     Reps 8  5  -ST  --       User Key  (r) = Recorded By, (t) = Taken By, (c) = Cosigned By    Initials Name Provider Type    Etelvina Hernandez OTR Occupational Therapist           Assessment & Plan     Assessment  Assessment details: Deferred wrist extension and pushing activity d/t continued minor discomfort from art line. Pt progressing with strengthening but requires cuing for correct technique d/t weakness. Pt continues to have mild numbness in R digit V but notes significant improvement in digit IV. Pt still with mild difficulty controlling full flexion of and opposition.     Plan  Plan details: Continue w/POC and goals as est.         Visit Diagnoses:    ICD-10-CM ICD-9-CM   1. Impaired mobility and ADLs  Z74.09 V49.89    Z78.9    2. Decreased coordination  R27.8 781.3   3. Decreased  strength of right hand  R29.898 729.89             Timed:  Manual Therapy:    0     mins  87001;  Therapeutic Exercise:    30     mins  80025;     Neuromuscular Danish:    0    mins  27387;    Therapeutic Activity:     14     mins  21191;     Self-Care/ADL     0     mins  39336;   Sensory Int. Tech      0     mins 34472;  Ultrasound:     0     mins  36179;    Electrical Stimulation:    0     mins  86360 ( );    Untimed:  Electrical Stimulation:    0     mins  38034 ( );    Timed Treatment:   44   mins   Total Treatment:     44   mins    OT Signature: Etelvina Hennessy MS, OTR/L, JOSHUA  [place] License #: [id number]

## 2021-01-12 NOTE — PROGRESS NOTES
PT Re-Assessment / Re-Certification  Visit: 2  Date of Initial Visit: Type: THERAPY  Noted: 2020    Patient: Raquel Espitia   : 1965  Diagnosis/ICD-10 Code:  Impaired functional mobility, balance, gait, and endurance [Z74.09]  Referring practitioner: JULIA Cook*  Date of Initial Visit: Type: THERAPY  Noted: 2020  Today's Date: 2021  Patient seen for 2 sessions      Subjective:   Patient reports: she had her procedure on her carotid on . She feels ok, just sore and fatigued.   Pain: 1/10  Clinical Progress: improved  Home Program Compliance: Yes  Treatment has included: therapeutic exercise and neuromuscular re-education    Objective   See Exercise, Manual, and Modality Logs for complete treatment.    PT Neuro  Exercise 1  Exercise Name 1: Nu-Step   Equipment/Resistance 1: L5  Time: 8 min   Exercise 2  Exercise Name 2: alternating toe taps without UE   Sets/Reps 2: 20  Exercise 3  Exercise Name 3: upward weight shift on step   Sets/Reps 3: 20 e[address]   Exercise 4  Exercise Name 4: sit to stands  Sets/Reps 4: 5  Exercise 5  Exercise Name 5: lateral stepping   Sets/Reps 5: 6 laps     Assessment & Plan     Assessment  Impairments: abnormal coordination, abnormal gait, activity intolerance, impaired balance, impaired physical strength and safety issue  Prognosis: fair  Functional Limitations: carrying objects, walking, standing and stooping  Goals  Plan Goals: STG (6 visits)  1. Patient will report compliance with initial HEP. MET  2. Patient to improve NATH balance score to >/= 15 /56 to decrease client's risk of falls. MET  3. Patient to perform TUG within 22 sec without LOB for improved functional mobility. ONGOING  4. Patient to ambulate 10 meters without AD within 20 sec without LOB for improved       gait eleazar and functional mobility. ONGOING    LTG (12 visits)  1. Patient will be I with final HEP. ONGOING  2. Patient to improve NATH balance score to >/= 20 /56 to  decrease client's risk of falls. ONGOING  3. Patient to perform TUG within < 20 sec without LOB for improved functional mobility. ONGOING  4. Patient to ambulate 10 meters without AD within < 18 sec without LOB for improved gait eleazar and functional mobility. ONGOING           Plan  Therapy options: will be seen for skilled physical therapy services  Planned modality interventions: TENS  Planned therapy interventions: ADL retraining, balance/weight-bearing training, flexibility, gait training, manual therapy, neuromuscular re-education, motor coordination training, postural training, strengthening, stretching, therapeutic activities, transfer training and home exercise program  Frequency: 1x week  Duration in visits: 8  Treatment plan discussed with: patient  Plan details: Patient will continue to be seen 1x/wk x 8wks with treatment to include strengthening, stretching, manual therapy, neuromuscular re-education, balance, gait and endurance training.           Visit Diagnoses:    ICD-10-CM ICD-9-CM   1. Impaired functional mobility, balance, gait, and endurance  Z74.09 V49.89   2. Cerebrovascular accident (CVA), unspecified mechanism (CMS/HCC)  I63.9 434.91       Progress toward previous goals: Partially Met      Recommendations: Continue as planned  Timeframe: 2 months    PT Signature: Lida Meyers, PT, DPT, MSCS, CDP  [place] License #: [id number]    Based upon review of the patient's progress and continued therapy plan, it is my medical opinion that Raquel Espitia should continue physical therapy treatment at [facility]  [address] [place] [id number] [phone].    Signature: __________________________________  Christian Rashid APRN    Timed:  Manual Therapy:    0     mins  91758;  Therapeutic Exercise:    10     mins  21345;     Neuromuscular Danish:    30    mins  54325;    Therapeutic Activity:     0     mins  20950;     Gait Trainin      mins  43543;     Electrical Stimulation:    0     mins  48246 ( );    Untimed:  Canalith Repositioning   0 mins  56546    Timed Treatment:   40   mins   Total Treatment:     40   mins

## 2021-01-19 ENCOUNTER — READMISSION MANAGEMENT (OUTPATIENT)
Dept: CALL CENTER | Facility: HOSPITAL | Age: 56
End: 2021-01-19

## 2021-01-19 ENCOUNTER — TREATMENT (OUTPATIENT)
Dept: PHYSICAL THERAPY | Facility: CLINIC | Age: 56
End: 2021-01-19

## 2021-01-19 DIAGNOSIS — Z74.09 IMPAIRED MOBILITY AND ADLS: Primary | ICD-10-CM

## 2021-01-19 DIAGNOSIS — Z78.9 IMPAIRED MOBILITY AND ADLS: Primary | ICD-10-CM

## 2021-01-19 DIAGNOSIS — R27.8 DECREASED COORDINATION: ICD-10-CM

## 2021-01-19 DIAGNOSIS — Z74.09 IMPAIRED FUNCTIONAL MOBILITY, BALANCE, GAIT, AND ENDURANCE: Primary | ICD-10-CM

## 2021-01-19 DIAGNOSIS — R29.898 DECREASED GRIP STRENGTH OF RIGHT HAND: ICD-10-CM

## 2021-01-19 DIAGNOSIS — I63.9 CEREBROVASCULAR ACCIDENT (CVA), UNSPECIFIED MECHANISM (HCC): ICD-10-CM

## 2021-01-19 PROCEDURE — 97530 THERAPEUTIC ACTIVITIES: CPT | Performed by: OCCUPATIONAL THERAPIST

## 2021-01-19 PROCEDURE — 97110 THERAPEUTIC EXERCISES: CPT | Performed by: OCCUPATIONAL THERAPIST

## 2021-01-19 PROCEDURE — 97530 THERAPEUTIC ACTIVITIES: CPT | Performed by: PHYSICAL THERAPIST

## 2021-01-19 PROCEDURE — 97110 THERAPEUTIC EXERCISES: CPT | Performed by: PHYSICAL THERAPIST

## 2021-01-19 PROCEDURE — 97116 GAIT TRAINING THERAPY: CPT | Performed by: PHYSICAL THERAPIST

## 2021-01-19 NOTE — PROGRESS NOTES
"OT Re-Assessment / Re-Certification        Patient: Raquel Espitia   : 1965  Diagnosis/ICD-10 Code:  Impaired mobility and ADLs [Z74.09, Z78.9]  Referring practitioner: Vikram Marie*  Date of Initial Visit: Type: THERAPY  Noted: 12/15/2020  Today's Date: 2021  Patient seen for 4 sessions      Subjective:   Patient reports: \"I've been working so hard on all my exercises\"  Pain: 0/10  Subjective Questionnaire: 9HPT R: 35.79 L: 26.44  Significantly improved translation and manipulation with R, affected hand since IE. Pt improved speed by more than half the time.   Clinical Progress: improved  Home Program Compliance: Yes  Treatment has included: therapeutic exercise and therapeutic activity    Subjective   Objective          Static Posture     Comments  Increasing participation in cooking and cleaning each wk d/t improving independence and comfort with R, dominant hand use    Strength/Myotome Testing     Left Wrist/Hand      (2nd hand position)     Trial 1: 30 lbs    Trial 2: 25 lbs    Trial 3: 29 lbs    Average: 28 lbs    Thumb Strength  Key/Lateral Pinch     Trial 1: 13 lbs    Trial 2: 13 lbs    Trial 3: 14 lbs    Average: 13.33 lbs    Right Wrist/Hand      (2nd hand position)     Trial 1: 35 lbs    Trial 2: 30 lbs    Trial 3: 29 lbs    Average: 31.33 lbs    Thumb Strength   Key/Lateral Pinch     Trial 1: 10 lbs    Trial 2: 11 lbs    Trial 3: 13 lbs    Average: 11.33 lbs        OT Neuro       OT Exercises     Row Name 21 0930             Precautions    Existing Precautions/Restrictions  fall  -ST         Exercise 1    Exercise Name 1  warm-up w/neural priming, UBE L 4.0  -ST      Time (Minutes) 1  6  -ST         Exercise 2    Exercise Name 2  OT re-assessment completed per POC  -ST         Exercise 3    Exercise Name 3  isolated digit IV/V controlled hold while remaining digits extended; then performed opposite digits in f/e  -ST         Exercise 4    Exercise Name 4  " translation w/washers positioning top to bottom using pincer grasp while maintaining hand/palmar closure remaining digits  -ST         Exercise 5    Exercise Name 5  tweezer prehension digits IV and IV retrieval of items from table   -ST         Exercise 6    Exercise Name 6  digit V placement of beads onto  for increased pincer control   -ST         Exercise 7    Exercise Name 7  pronation/supination, red flexi-bar  -ST      Sets 7  2  -ST      Reps 7  10  -ST        User Key  (r) = Recorded By, (t) = Taken By, (c) = Cosigned By    Initials Name Provider Type    Etelvina Hernandez OTR Occupational Therapist        Assessment & Plan     Assessment  Impairments: abnormal coordination, abnormal gait, abnormal muscle firing, abnormal muscle tone, abnormal or restricted ROM, activity intolerance, impaired balance, impaired physical strength, lacks appropriate home exercise program, pain with function and safety issue  Assessment details: OT re-assessment completed per POC. Pt demonstrates significant improvement in FMC dexterity with speed, accuracy and overall translation/manipulation of affected, R hand. Pt continues to demonstrate most difficulty with V digit control of f/e and mild numbness. Pt also with tightness with weakness through RUE, particularly shoulder, elbow and wrist. Pt however progressing each session with tolerance. Recommend continued skilled OT services to address above deficits and promote increased independence, strength, & participation in daily tasks.     Progressed difficulty with high-level dexterity tasks this date including controlled flexion and hold of digits IV/V while remaining digits extended and vise versa.   Prognosis: fair  Functional Limitations: carrying objects, lifting, sleeping, walking, pulling, pushing, uncomfortable because of pain, moving in bed, sitting, standing, stooping, reaching behind back, reaching overhead and unable to perform repetitive  tasks  Goals  Plan Goals:   Pt will score 55 seconds or less on the 9HPT to demonstrate improved accuracy and speed with fine motor coordination by 8 wks.  MET    Pt will increase R  strength to 30 # by 8 wks to demonstrate improved strength and function for daily tasks. MET    Pt will be independent with hand strengthening HEP to increase independence with ADL/IADL performance tasks by 4 wks. PROGRESSING DIFFICULTY; MOSTLY MET    Pt will be independent with hand FMC HEP to increase independence with ADL/IADL performance tasks by 4 wks. PROGRESSING DIFFICULTY; MOSTLY MET    Pt will be independent with R UE strengthening HEP to increase performance in ADL/IADL tasks by 4 wks. PARTIALLY MET        Plan  Planned therapy interventions: ADL retraining, balance/weight-bearing training, fine motor coordination training, flexibility, functional ROM exercises, home exercise program, motor coordination training, neuromuscular re-education, postural training, strengthening, stretching, therapeutic activities, transfer training and IADL retraining  Frequency: 1x week  Duration in visits: 10  Treatment plan discussed with: patient  Plan details: Est. OT POC and goals to reflect above deficits and promote increased independence, safety, engagement and participation in daily tasks.         Visit Diagnoses:    ICD-10-CM ICD-9-CM   1. Impaired mobility and ADLs  Z74.09 V49.89    Z78.9    2. Decreased coordination  R27.8 781.3   3. Decreased  strength of right hand  R29.898 729.89       Progress toward previous goals: Partially Met      Recommendations: Continue as planned  Timeframe: 6 weeks  Prognosis to achieve goals: good    OT Signature: Etelvina Hennessy MS, OTR/L, CDP  [place] License #: [id number]    Based upon review of the patient's progress and continued therapy plan, it is my medical opinion that Raquel Espitia should continue occupational therapy treatment at [facility]  [address] [facility] [id number] [phone].    Signature: __________________________________  Vikram Stewart MD    Timed:  Manual Therapy:    0     mins  54831;  Therapeutic Exercise:    13     mins  37461;     Neuromuscular Danish:    0    mins  64684;    Therapeutic Activity:     27     mins  59038;     Self-Care/ADL     0     mins  08560;   Sensory Int. Tech      0     mins 64442;  Ultrasound:     0     mins  83261;    Electrical Stimulation:    0     mins  30245 ( );    Untimed:  Electrical Stimulation:    0     mins  58755 ( );    Timed Treatment:   40   mins   Total Treatment:     40   mins

## 2021-01-19 NOTE — OUTREACH NOTE
General Surgery Week 3 Survey      Responses   [facility] patient discharged from?  [facility]   Does the patient have one of the following disease processes/diagnoses(primary or secondary)?  General Surgery   Week 3 attempt successful?  Yes   Call start time  1147   Call end time  1149   Discharge diagnosis  CAROTID ENDARTERECTOMY   Meds reviewed with patient/caregiver?  Yes   Is the patient taking all medications as directed (includes completed medication regime)?  Yes   Does the patient have a follow up appointment scheduled with their surgeon?  Yes [1/21/21]   Has the patient kept scheduled appointments due by today?  Yes   What is the patient's perception of their health status since discharge?  Improving   Nursing interventions  Nurse provided patient education   Is the patient/caregiver able to teach back signs and symptoms of incisional infection?  Pus or odor from incision   Is the patient/caregiver able to teach back steps to recovery at home?  Rest and rebuild strength, gradually increase activity, Eat a well-balance diet   Week 3 call completed?  Yes   Revoked  No further contact(revokes)-requires comment   Is the patient interested in additional calls from an ambulatory ?  NOTE:  applies to high risk patients requiring additional follow-up.  No   Graduated/Revoked comments  Pt reports a call next week is not necessary           Jennifer Cyr RN

## 2021-01-19 NOTE — PROGRESS NOTES
Physical Therapy Daily Progress Note  Visit: 3  Date of Initial Visit: Type: THERAPY  Noted: 2020    Patient: Raquel Espitia   : 1965  Diagnosis/ICD-10 Code:  Impaired functional mobility, balance, gait, and endurance [Z74.09]  Referring practitioner: JULIA Cook*  Date of Initial Visit: Type: THERAPY  Noted: 2020  Today's Date: 2021  Patient seen for 3 sessions      Subjective:   Patient reports: she is feeling a little better compared to last week.   Pain: 0/10  Clinical Progress: improved  Home Program Compliance: Yes  Treatment has included: therapeutic exercise, therapeutic activity and gait training    Objective   See Exercise, Manual, and Modality Logs for complete treatment.    PT Neuro          Assessment/Plan    Timed:  Manual Therapy:            0     mins  37108;  Therapeutic Exercise:    15    mins  29094;     Neuromuscular Danish:    0    mins  13118;    Therapeutic Activity:      10     mins  67440;     Gait Trainin    mins  76318;     Electrical Stimulation:    0    mins  84434 ( );     Untimed:  Canalith Repositioning techniques _0_ 95055      Timed Treatment:   45   mins   Total Treatment:     45   mins      Lida Meyers PT, DPT, MSCS, CDP  [place] License #: [id number]  Physical Therapist

## 2021-01-21 ENCOUNTER — OFFICE VISIT (OUTPATIENT)
Dept: NEUROSURGERY | Facility: CLINIC | Age: 56
End: 2021-01-21

## 2021-01-21 VITALS
HEIGHT: 62 IN | SYSTOLIC BLOOD PRESSURE: 122 MMHG | BODY MASS INDEX: 30.66 KG/M2 | WEIGHT: 166.6 LBS | RESPIRATION RATE: 16 BRPM | OXYGEN SATURATION: 96 % | DIASTOLIC BLOOD PRESSURE: 60 MMHG | TEMPERATURE: 97.3 F | HEART RATE: 112 BPM

## 2021-01-21 DIAGNOSIS — I65.21 ASYMPTOMATIC CAROTID ARTERY STENOSIS WITHOUT INFARCTION, RIGHT: ICD-10-CM

## 2021-01-21 DIAGNOSIS — I65.22 SYMPTOMATIC CAROTID ARTERY STENOSIS, LEFT: ICD-10-CM

## 2021-01-21 DIAGNOSIS — R53.1 ACUTE RIGHT-SIDED WEAKNESS: ICD-10-CM

## 2021-01-21 DIAGNOSIS — Z71.6 TOBACCO ABUSE COUNSELING: Primary | ICD-10-CM

## 2021-01-21 PROCEDURE — 99024 POSTOP FOLLOW-UP VISIT: CPT | Performed by: PHYSICIAN ASSISTANT

## 2021-01-21 RX ORDER — CLOPIDOGREL BISULFATE 75 MG/1
75 TABLET ORAL DAILY
Qty: 60 TABLET | Refills: 3 | Status: SHIPPED | OUTPATIENT
Start: 2021-01-21 | End: 2021-01-01

## 2021-01-21 NOTE — PATIENT INSTRUCTIONS

## 2021-01-21 NOTE — PROGRESS NOTES
Subjective   Patient ID: Raquel Espitia is a 55 y.o. female is here today for follow-up for wound check.    HPI:      Patient is a very nice 55-year-old smoker who is known to the neurosurgical practice for having asymptomatic high-grade left carotid artery stenosis and subsequent CVA and underwent a urgent angiography and received left internal carotid artery stent by Dr. Gregg Patrick.  Procedure performed on 11/25/2020 and she is made about full recovery from that.  During that angiogram patient was noted to have a extremely high-grade right internal carotid artery stenosis that was deemed for surgery by Dr. Patrick as well.  Patient was taken to the operating room on 12/30/2020 for a right-sided CEA.  Patient is back today for wound check.  Patient's incision is clean dry no sign of infection bleeding erythema.    Patient was instructed to stay on aspirin and Plavix postoperatively but allowed her prescription to run out and failed to call our office for refill.  I have refilled aspirin and Plavix and she is continue taking the high-dose statin.    The following portions of the patient's history were reviewed and updated as appropriate: allergies, current medications, past family history, past medical history, past social history, past surgical history and problem list.    Review of Systems   Constitutional: Negative for activity change, appetite change, chills, diaphoresis, fatigue, fever and unexpected weight change.   HENT: Negative for congestion, dental problem, drooling, ear discharge, ear pain, facial swelling, hearing loss, mouth sores, nosebleeds, postnasal drip, rhinorrhea, sinus pressure, sinus pain, sneezing, sore throat, tinnitus, trouble swallowing and voice change.    Eyes: Negative for photophobia, pain, discharge, redness, itching and visual disturbance.   Respiratory: Negative for apnea, cough, choking, chest tightness, shortness of breath, wheezing and stridor.    Cardiovascular: Negative  "for chest pain, palpitations and leg swelling.   Gastrointestinal: Negative for abdominal distention, abdominal pain, anal bleeding, blood in stool, constipation, diarrhea, nausea, rectal pain and vomiting.   Endocrine: Negative for cold intolerance, heat intolerance, polydipsia, polyphagia and polyuria.   Genitourinary: Negative for decreased urine volume, difficulty urinating, dyspareunia, dysuria, enuresis, flank pain, frequency, genital sores, hematuria, menstrual problem, pelvic pain, urgency, vaginal bleeding, vaginal discharge and vaginal pain.   Musculoskeletal: Negative for arthralgias, back pain, gait problem, joint swelling, myalgias, neck pain and neck stiffness.   Skin: Negative for color change, pallor, rash and wound.   Allergic/Immunologic: Negative for environmental allergies, food allergies and immunocompromised state.   Neurological: Negative for dizziness, tremors, seizures, syncope, facial asymmetry, speech difficulty, weakness, light-headedness, numbness and headaches.   Hematological: Negative for adenopathy. Does not bruise/bleed easily.   Psychiatric/Behavioral: Negative for agitation, behavioral problems, confusion, decreased concentration, dysphoric mood, hallucinations, self-injury, sleep disturbance and suicidal ideas. The patient is not nervous/anxious and is not hyperactive.          Objective    reports that she has been smoking cigarettes. She has a 60.00 pack-year smoking history. She has quit using smokeless tobacco.  Her smokeless tobacco use included chew. She reports that she does not drink alcohol or use drugs.   SMOKING STATUS: I spent greater than 10 minutes educating the patient on smoking cessation, and discussed how smoking pertains to the particular disease process at hand.  The patient acknowledges understanding.      Physical Exam:   Vitals:/60   Pulse 112   Temp 97.3 °F (36.3 °C)   Resp 16   Ht 157.5 cm (62.01\")   Wt 75.6 kg (166 lb 9.6 oz)   SpO2 96%   " BMI 30.46 kg/m²    BMI: Body mass index is 30.46 kg/m².     GENERAL:   The patient is in no acute distress, and is able to answer all questions appropriately.  Skin:  The incision is well-healed and well approximated.  No signs of infection, bleeding, or erythema.  Musculoskeletal:     strength is 4-5 on right 5 out of 5 on left.   Shoulder abduction is 4-5 on right 5/5 on left  Dorsiflexion is 5/5 Bilaterally  Plantarflexion is 5/5 bilaterally  Hip Flexion 5/5 bilaterally.    The patient´s gait is normal without antalgia uses assistance of cane.    Neurologic:     Minimal to no facial droop  The patient is alert and oriented by 3.     Pupils are equal and reactive to light.    Visual fields are full.    Extraocular movements are intact without nystagmus.    There is no evidence of central motor drift. No facial droop.  No difficulty with rapid alternating movements.    Sensation is equal bilaterally with no deficit.      Reflexes:  2+ @ biceps, triceps, brachioradialis, as well as the patellar and Achilles tendon bilaterally.  No sign of Sousa's, clonus, or long track signs      Assessment/Plan   Independent Review of Radiographic Studies:    No new films reviewed at this visit  Medical Decision Making:      Patient is going to see us back in about 90 days with a repeat duplex ultrasound to ensure patency of the carotids.  I have strongly insisted she stop smoking.  I am also educated on weight loss.  Patient will continue on high-dose statin as well as dual antiplatelet therapy.    Patient wants us to review her lumbar films but we will not likely do this within the year due to her excessive stroke risk as well as her ongoing smoking/tobacco abuse.     We will see her back in 90 days  Patient's Body mass index is 30.46 kg/m². BMI is above normal parameters. Recommendations include: educational material and exercise counseling.    Diagnoses and all orders for this visit:    1. Tobacco abuse counseling  (Primary)    2. Acute right-sided weakness    3. Symptomatic carotid artery stenosis, left  -     Duplex Carotid Ultrasound CAR; Future    4. Asymptomatic carotid artery stenosis without infarction, right  -     Duplex Carotid Ultrasound CAR; Future    Other orders  -     clopidogrel (PLAVIX) 75 MG tablet; Take 1 tablet by mouth Daily for 60 days. CONTINUE  Dispense: 60 tablet; Refill: 3      Return in about 3 months (around 4/21/2021).

## 2021-01-21 NOTE — PROGRESS NOTES
Patient: Raquel Espitia  : 1965    Primary Care Provider: Vikram Stewart MD      Chief Complaint: ***    History of Present Illness:       ***    Review of Systems   Constitutional: Negative.    HENT: Negative.    Eyes: Negative.    Respiratory: Negative.    Cardiovascular: Negative.    Gastrointestinal: Negative.    Endocrine: Negative.    Genitourinary: Negative.    Musculoskeletal: Negative.    Skin: Negative.    Allergic/Immunologic: Negative.    Neurological: Negative.    Hematological: Negative.    Psychiatric/Behavioral: Negative.        Past Medical History:     Past Medical History:   Diagnosis Date   • Acute bronchitis    • Arthritis    • Asthma    • COPD (chronic obstructive pulmonary disease) (CMS/[facility])    • Diabetes mellitus (CMS/[facility])     TWICE PER WEEK CHECKS SUGAR    • Edema    • Fibromyalgia    • GERD (gastroesophageal reflux disease)    • Headache    • Hyperlipidemia    • Hypertension    • Impaired fasting glucose    • Low back pain    • Migraine    • Pain of left calf    • Shingles outbreak     8-9  YEARS AGO- THINK HAD IT    • Stroke (CMS/[facility])     X2 TIA -  BOTH SIDES EFFECTED    • Wears dentures     FULL BUT DOESNT WEAR ALWAYS    • Wears glasses        Family History:     Family History   Problem Relation Age of Onset   • Hypertension Mother    • Stroke Mother    • Cancer Father    • Stroke Father        Social History:    reports that she has been smoking cigarettes. She has a 60.00 pack-year smoking history. She has quit using smokeless tobacco.  Her smokeless tobacco use included chew. She reports that she does not drink alcohol or use drugs.   SMOKING STATUS: ***    Surgical History:     Past Surgical History:   Procedure Laterality Date   • ABDOMINAL SURGERY      X2 ULCER REPAIR    • BACK SURGERY      X2 fusion lumbar   • CAROTID ENDARTERECTOMY Right 2020    Procedure: CAROTID ENDARTERECTOMY WITH EEG RIGHT;  Surgeon: Gregg Patrick MD;  Location: [facility] OR;   Service: Neurosurgery;  Laterality: Right;   •  SECTION     • CHOLECYSTECTOMY     • COLONOSCOPY     • ENDOSCOPY     • HAND SURGERY      RIGHT nerve repair   • INTERVENTIONAL RADIOLOGY PROCEDURE Bilateral 2020    Procedure: CAROTID CEREBRAL ANGIOGRAM BILATERAL;  Surgeon: Gregg Patrick MD;  Location: [facility] INVASIVE LOC[facility];  Service: Interventional Radiology;  Laterality: Bilateral;   • NECK SURGERY      cervicle spine plate        Allergies:   Patient has no known allergies.    Physical Exam:    Vital Signs:There were no vitals taken for this visit.   BMI: There is no height or weight on file to calculate BMI.         ***    Medical Decision Making    Data Review:   ***    Diagnosis:   ***    Treatment Options:   ***    Patient's There is no height or weight on file to calculate BMI. BMI is {BMI range:22527}.    No diagnosis found.

## 2021-01-25 RX ORDER — ATORVASTATIN CALCIUM 80 MG/1
TABLET, FILM COATED ORAL
Qty: 30 TABLET | Refills: 1 | OUTPATIENT
Start: 2021-01-25

## 2021-01-25 RX ORDER — ATORVASTATIN CALCIUM 80 MG/1
80 TABLET, FILM COATED ORAL NIGHTLY
Qty: 30 TABLET | Refills: 5 | Status: SHIPPED | OUTPATIENT
Start: 2021-01-25 | End: 2021-01-01 | Stop reason: SDUPTHER

## 2021-01-26 ENCOUNTER — TELEPHONE (OUTPATIENT)
Dept: ORTHOPEDICS | Facility: OTHER | Age: 56
End: 2021-01-26

## 2021-02-02 ENCOUNTER — TREATMENT (OUTPATIENT)
Dept: PHYSICAL THERAPY | Facility: CLINIC | Age: 56
End: 2021-02-02

## 2021-02-02 DIAGNOSIS — Z74.09 IMPAIRED FUNCTIONAL MOBILITY, BALANCE, GAIT, AND ENDURANCE: ICD-10-CM

## 2021-02-02 DIAGNOSIS — Z74.09 IMPAIRED MOBILITY AND ADLS: Primary | ICD-10-CM

## 2021-02-02 DIAGNOSIS — R27.8 DECREASED COORDINATION: ICD-10-CM

## 2021-02-02 DIAGNOSIS — Z78.9 IMPAIRED MOBILITY AND ADLS: Primary | ICD-10-CM

## 2021-02-02 DIAGNOSIS — R29.898 DECREASED GRIP STRENGTH OF RIGHT HAND: ICD-10-CM

## 2021-02-02 DIAGNOSIS — I63.9 CEREBROVASCULAR ACCIDENT (CVA), UNSPECIFIED MECHANISM (HCC): Primary | ICD-10-CM

## 2021-02-02 PROCEDURE — 97530 THERAPEUTIC ACTIVITIES: CPT | Performed by: OCCUPATIONAL THERAPIST

## 2021-02-02 PROCEDURE — 97110 THERAPEUTIC EXERCISES: CPT | Performed by: OCCUPATIONAL THERAPIST

## 2021-02-02 PROCEDURE — 97112 NEUROMUSCULAR REEDUCATION: CPT | Performed by: PHYSICAL THERAPIST

## 2021-02-02 PROCEDURE — 97110 THERAPEUTIC EXERCISES: CPT | Performed by: PHYSICAL THERAPIST

## 2021-02-02 NOTE — PROGRESS NOTES
Physical Therapy Daily Progress Note  Visit: 4  Date of Initial Visit: Type: THERAPY  Noted: 2020    Patient: Raquel Espitia   : 1965  Diagnosis/ICD-10 Code:  Cerebrovascular accident (CVA), unspecified mechanism (CMS/HCC) [I63.9]  Referring practitioner: JULIA Cook*  Date of Initial Visit: Type: THERAPY  Noted: 2020  Today's Date: 2021  Patient seen for 4 sessions      Subjective:   Patient reports: she only slept one hour last night.   Pain: 0/10  Clinical Progress: improved  Home Program Compliance: Yes  Treatment has included: therapeutic exercise and neuromuscular re-education    Objective   See Exercise, Manual, and Modality Logs for complete treatment.    PT Neuro   Exercise 1  Exercise Name 1: Nu-Step   Equipment/Resistance 1: L5  Time: 8 min - endurance   Exercise 2  Exercise Name 2: gait training around clinic - cues for swing through and longer step length   Sets/Reps 2: 1 lap   Exercise 3  Exercise Name 3: walking through hurdles in // bars  Equipment/Resistance 3: 6 laps   Exercise 4  Exercise Name 4: SB sitting x 2 min, progressed to knee lifts and ER slides     Assessment & Plan     Assessment  Assessment details: Patient demonstrates increased fatigue this date due to no sleep previous night. Patient with increased weakness in legs with difficulty stepping over objects and walking without LOB. Patient's session ended a few minutes early at her request.     Plan  Plan details: Patient to continue with PT services to improve gait, balance, strength, transfers and overall functional mobility.          Timed:  Manual Therapy:            0     mins  72918;  Therapeutic Exercise:    8    mins  59989;     Neuromuscular Danish:    30    mins  77860;    Therapeutic Activity:      0     mins  40041;     Gait Trainin    mins  05096;     Electrical Stimulation:    0    mins  71063 ( );     Untimed:  Canalith Repositioning techniques _0_  15010      Timed Treatment:   38   mins   Total Treatment:     38   mins      Lida Meyers PT, DPT, MSCS, CDP  [place] License #: [id number]  Physical Therapist

## 2021-02-02 NOTE — PROGRESS NOTES
"Occupational Therapy Daily Progress Note  Visit: 5  Date of Initial Visit: Type: THERAPY  Noted: 12/15/2020      Patient: Raquel Espitia   : 1965  Diagnosis/ICD-10 Code:  Impaired mobility and ADLs [Z74.09, Z78.9]  Referring practitioner: Vikram Marie*  Date of Initial Visit: Type: THERAPY  Noted: 12/15/2020  Today's Date: 2021  Patient seen for 5 sessions      Subjective:   Patient reports: \"I feel really good today\"  Pain: 0/10  Subjective Questionnaire: n/a  Clinical Progress: improved  Home Program Compliance: Yes  Treatment has included: therapeutic exercise and therapeutic activity    Subjective   Objective     OT Neuro         OT Exercises     Row Name 21 0845             Precautions    Existing Precautions/Restrictions  fall  -ST         Exercise 1    Exercise Name 1  warm-up w/neural priming, UBE L 4.0  -ST      Time (Minutes) 1  6  -ST         Exercise 2    Exercise Name 2  loaded end strengthening 2# then 3# short tbar  -ST      Equipment 2  Dowel  -ST      Sets 2  2  -ST      Reps 2  10  -ST         Exercise 3    Exercise Name 3  digit IV and V flexion toward wrist, OT required to assist w/digit V and increased cuing for extension R hand   -ST         Exercise 4    Exercise Name 4  ball squeezes on blue unwted ball, entire hand X10  -ST         Exercise 5    Exercise Name 5  individual digit squeezes on blue unwted ball  -ST      Sets 5  2  -ST      Reps 5  10  -ST         Exercise 6    Exercise Name 6  tactile sensory re-ed activity retrieving washers with digits IV/V and thumb only  -ST        User Key  (r) = Recorded By, (t) = Taken By, (c) = Cosigned By    Initials Name Provider Type    Etelvina Hernandez, OTR Occupational Therapist           Assessment & Plan     Assessment  Assessment details: Pt progressing greatly with RUE strengthening, tolerating increased wts and reps this date. Pt improving with digit IV and V control and ROM but still requires additional cuing " and effort for flexion during hand closure and increasing speed with extension. Pt continues to have difficulty opening jars/cans/containters but notes improvement in other HM related tasks.     Plan  Plan details: Continue w/OT POC and goals as est with increased focus on RUE strength and FMC.        Visit Diagnoses:    ICD-10-CM ICD-9-CM   1. Impaired mobility and ADLs  Z74.09 V49.89    Z78.9    2. Decreased coordination  R27.8 781.3   3. Decreased  strength of right hand  R29.898 729.89             Timed:  Manual Therapy:    0     mins  28133;  Therapeutic Exercise:    30     mins  19501;     Neuromuscular Danish:    0    mins  76655;    Therapeutic Activity:     10     mins  18026;     Self-Care/ADL     0     mins  56244;   Sensory Int. Tech      0     mins 85608;  Ultrasound:     0     mins  57482;    Electrical Stimulation:    0     mins  95804 ( );    Untimed:  Electrical Stimulation:    0     mins  53907 ( );    Timed Treatment:   40   mins   Total Treatment:     40   mins    OT Signature: Etelvina Hennessy MS, OTR/L, CDP  [place] License #: [id number]

## 2021-02-09 ENCOUNTER — TREATMENT (OUTPATIENT)
Dept: PHYSICAL THERAPY | Facility: CLINIC | Age: 56
End: 2021-02-09

## 2021-02-09 DIAGNOSIS — Z78.9 IMPAIRED MOBILITY AND ADLS: Primary | ICD-10-CM

## 2021-02-09 DIAGNOSIS — R29.898 DECREASED GRIP STRENGTH OF RIGHT HAND: ICD-10-CM

## 2021-02-09 DIAGNOSIS — R27.8 DECREASED COORDINATION: ICD-10-CM

## 2021-02-09 DIAGNOSIS — Z74.09 IMPAIRED FUNCTIONAL MOBILITY, BALANCE, GAIT, AND ENDURANCE: ICD-10-CM

## 2021-02-09 DIAGNOSIS — I63.9 CEREBROVASCULAR ACCIDENT (CVA), UNSPECIFIED MECHANISM (HCC): Primary | ICD-10-CM

## 2021-02-09 DIAGNOSIS — Z74.09 IMPAIRED MOBILITY AND ADLS: Primary | ICD-10-CM

## 2021-02-09 PROCEDURE — 97110 THERAPEUTIC EXERCISES: CPT | Performed by: OCCUPATIONAL THERAPIST

## 2021-02-09 PROCEDURE — 97112 NEUROMUSCULAR REEDUCATION: CPT | Performed by: OCCUPATIONAL THERAPIST

## 2021-02-09 PROCEDURE — 97112 NEUROMUSCULAR REEDUCATION: CPT | Performed by: PHYSICAL THERAPIST

## 2021-02-09 PROCEDURE — 97530 THERAPEUTIC ACTIVITIES: CPT | Performed by: OCCUPATIONAL THERAPIST

## 2021-02-09 PROCEDURE — 97110 THERAPEUTIC EXERCISES: CPT | Performed by: PHYSICAL THERAPIST

## 2021-02-09 NOTE — PROGRESS NOTES
"Occupational Therapy Daily Progress Note  Visit: 6  Date of Initial Visit: Type: THERAPY  Noted: 12/15/2020      Patient: Raquel Espitia   : 1965  Diagnosis/ICD-10 Code:  Impaired mobility and ADLs [Z74.09, Z78.9]  Referring practitioner: Vikram Marie*  Date of Initial Visit: Type: THERAPY  Noted: 12/15/2020  Today's Date: 2021  Patient seen for 6 sessions      Subjective:   Patient reports: \"I'm really tired today. I did a lot of walking yesterday\"  Pain: 0/10  Subjective Questionnaire: n/a  Clinical Progress: improved  Home Program Compliance: Yes  Treatment has included: therapeutic exercise, neuromuscular re-education and therapeutic activity    Subjective   Objective     OT Neuro         OT Exercises     Row Name 21 0915             Precautions    Existing Precautions/Restrictions  fall  -ST         Exercise 1    Exercise Name 1  warm-up w/neural priming, UBE L 4.0  -ST      Time (Minutes) 1  6  -ST         Exercise 2    Exercise Name 2  standing performing toss/catch to OT w/2# med ball; progressed to semi tandem in each direction  -ST         Exercise 3    Exercise Name 3  isolated digit IV f/e, focus on control and strength of PIP and DIP jts R hand   -ST         Exercise 4    Exercise Name 4  pronation/supination, red flexi bar  -ST      Sets 4  2  -ST      Reps 4  15  -ST         Exercise 5    Exercise Name 5  focus on maintaining grasp and contact with digit IV, requiring additional time and effort   -ST         Exercise 6    Exercise Name 6  retrieval of items using digits I/IV  -ST         Exercise 7    Exercise Name 7  loaded end strengthening, short tbar, OH press/CP  -ST      Weights/Plates 7  2  -ST      Sets 7  2  -ST      Reps 7  10  -ST        User Key  (r) = Recorded By, (t) = Taken By, (c) = Cosigned By    Initials Name Provider Type    Etelvina Hernandez OTR Occupational Therapist           Assessment & Plan     Assessment  Assessment details: Pt improving with " use of digit IV and exhibits better f/e of PIP and DIP joints. Pt however has trouble maintaining contact with objects, marlys with wt added during lifting and moving.     Plan  Plan details: Continue w/POC and goals as est.         Visit Diagnoses:    ICD-10-CM ICD-9-CM   1. Impaired mobility and ADLs  Z74.09 V49.89    Z78.9    2. Decreased coordination  R27.8 781.3   3. Decreased  strength of right hand  R29.898 729.89             Timed:  Manual Therapy:    0     mins  40343;  Therapeutic Exercise:    15     mins  88655;     Neuromuscular Danish:    15    mins  40466;    Therapeutic Activity:     15     mins  99294;     Self-Care/ADL     0     mins  98790;   Sensory Int. Tech      0     mins 27763;  Ultrasound:     0     mins  48604;    Electrical Stimulation:    0     mins  62322 ( );    Untimed:  Electrical Stimulation:    0     mins  01705 ( );    Timed Treatment:   45   mins   Total Treatment:     45   mins    OT Signature: Etelvina Hennessy MS, OTR/L, JOSHUA  [place] License #: [id number]

## 2021-02-09 NOTE — PROGRESS NOTES
Physical Therapy Daily Progress Note  Visit: 5  Date of Initial Visit: Type: THERAPY  Noted: 2020    Patient: Raquel Espitia   : 1965  Diagnosis/ICD-10 Code:  Cerebrovascular accident (CVA), unspecified mechanism (CMS/HCC) [I63.9]  Referring practitioner: JULIA Cook*  Date of Initial Visit: Type: THERAPY  Noted: 2020  Today's Date: 2021  Patient seen for 5 sessions      Subjective:   Patient reports: she had issues walking up a hill this week.   Pain: 0/10  Clinical Progress: improved  Home Program Compliance: Yes  Treatment has included: therapeutic exercise and neuromuscular re-education    Objective   See Exercise, Manual, and Modality Logs for complete treatment.    PT Neuro   Exercise 1  Exercise Name 1: Nu-Step   Equipment/Resistance 1: L6  Time: 8 min - endurance   Exercise 2  Exercise Name 2: step up/down step   Sets/Reps 2: 10  Exercise 3  Exercise Name 3: step over HFR with step up onto step - reversing back to start position   Sets/Reps 3: 10  Exercise 4  Exercise Name 4: straddle stepping   Sets/Reps 4: 5 ea side   Exercise 5  Exercise Name 5: lateral step onto step with opposite arm crossbody reaching   Sets/Reps 5: 10 ea   Exercise 6  Exercise Name 6: stepping up and over step with < 4 step pivot turn   Sets/Reps 6: 6  Exercise 7  Exercise Name 7: airex standing narrow base - vertical and horizontal head turns   Sets/Reps 7: 10 ea   Exercise 8  Exercise Name 8: airex standing with EC   Time 8: 30 sec     Assessment & Plan     Assessment  Assessment details: Patient demonstrates improved endurance with activity, as well as improved balance. Pt did not require the use of UE's during any exercise. Patient will require continued strengthening, endurance and balance re-training.     Plan  Plan details: Patient to continue with PT services to improve gait, balance, strength, transfers and overall functional mobility.          Timed:  Manual Therapy:            0     mins   08064;  Therapeutic Exercise:    8    mins  89829;     Neuromuscular Danish:    32    mins  20693;    Therapeutic Activity:      0     mins  96034;     Gait Trainin    mins  65761;     Electrical Stimulation:    0    mins  61497 ( );     Untimed:  Canalith Repositioning techniques _0_ 12388      Timed Treatment:   40   mins   Total Treatment:     40   mins      Lida Meyers PT, DPT, MSCS, CDP  [place] License #: [id number]  Physical Therapist

## 2021-02-12 ENCOUNTER — OFFICE VISIT (OUTPATIENT)
Dept: FAMILY MEDICINE CLINIC | Facility: CLINIC | Age: 56
End: 2021-02-12

## 2021-02-12 VITALS
OXYGEN SATURATION: 98 % | DIASTOLIC BLOOD PRESSURE: 64 MMHG | BODY MASS INDEX: 30.73 KG/M2 | HEIGHT: 62 IN | WEIGHT: 167 LBS | SYSTOLIC BLOOD PRESSURE: 132 MMHG | HEART RATE: 108 BPM | TEMPERATURE: 97.6 F

## 2021-02-12 DIAGNOSIS — Z72.0 TOBACCO ABUSE: ICD-10-CM

## 2021-02-12 DIAGNOSIS — E11.9 TYPE 2 DIABETES MELLITUS WITHOUT COMPLICATION, WITHOUT LONG-TERM CURRENT USE OF INSULIN (HCC): Primary | ICD-10-CM

## 2021-02-12 DIAGNOSIS — R21 RASH: ICD-10-CM

## 2021-02-12 DIAGNOSIS — I10 BENIGN ESSENTIAL HYPERTENSION: ICD-10-CM

## 2021-02-12 LAB — GLUCOSE BLDC GLUCOMTR-MCNC: 424 MG/DL (ref 70–130)

## 2021-02-12 PROCEDURE — 82962 GLUCOSE BLOOD TEST: CPT | Performed by: FAMILY MEDICINE

## 2021-02-12 PROCEDURE — 99213 OFFICE O/P EST LOW 20 MIN: CPT | Performed by: FAMILY MEDICINE

## 2021-02-12 RX ORDER — TRIAMCINOLONE ACETONIDE 5 MG/G
CREAM TOPICAL 3 TIMES DAILY
Qty: 30 G | Refills: 2 | Status: SHIPPED | OUTPATIENT
Start: 2021-02-12 | End: 2022-01-01 | Stop reason: HOSPADM

## 2021-02-12 RX ORDER — NICOTINE 21 MG/24HR
1 PATCH, TRANSDERMAL 24 HOURS TRANSDERMAL EVERY 24 HOURS
Qty: 14 PATCH | Refills: 5 | Status: SHIPPED | OUTPATIENT
Start: 2021-02-12 | End: 2021-01-01 | Stop reason: SDUPTHER

## 2021-03-02 ENCOUNTER — TELEPHONE (OUTPATIENT)
Dept: PHYSICAL THERAPY | Facility: CLINIC | Age: 56
End: 2021-03-02

## 2021-03-09 ENCOUNTER — TREATMENT (OUTPATIENT)
Dept: PHYSICAL THERAPY | Facility: CLINIC | Age: 56
End: 2021-03-09

## 2021-03-09 ENCOUNTER — TELEPHONE (OUTPATIENT)
Dept: NEUROSURGERY | Facility: CLINIC | Age: 56
End: 2021-03-09

## 2021-03-09 DIAGNOSIS — Z74.09 IMPAIRED MOBILITY AND ADLS: Primary | ICD-10-CM

## 2021-03-09 DIAGNOSIS — Z74.09 IMPAIRED FUNCTIONAL MOBILITY, BALANCE, GAIT, AND ENDURANCE: ICD-10-CM

## 2021-03-09 DIAGNOSIS — R27.8 DECREASED COORDINATION: ICD-10-CM

## 2021-03-09 DIAGNOSIS — Z78.9 IMPAIRED MOBILITY AND ADLS: Primary | ICD-10-CM

## 2021-03-09 DIAGNOSIS — I63.9 CEREBROVASCULAR ACCIDENT (CVA), UNSPECIFIED MECHANISM (HCC): ICD-10-CM

## 2021-03-09 DIAGNOSIS — R29.898 DECREASED GRIP STRENGTH OF RIGHT HAND: ICD-10-CM

## 2021-03-09 PROCEDURE — 97530 THERAPEUTIC ACTIVITIES: CPT | Performed by: PHYSICAL THERAPIST

## 2021-03-09 PROCEDURE — 97530 THERAPEUTIC ACTIVITIES: CPT | Performed by: OCCUPATIONAL THERAPIST

## 2021-03-09 PROCEDURE — 97112 NEUROMUSCULAR REEDUCATION: CPT | Performed by: PHYSICAL THERAPIST

## 2021-03-09 PROCEDURE — 97110 THERAPEUTIC EXERCISES: CPT | Performed by: PHYSICAL THERAPIST

## 2021-03-09 PROCEDURE — 97110 THERAPEUTIC EXERCISES: CPT | Performed by: OCCUPATIONAL THERAPIST
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
## 2021-03-09 NOTE — PROGRESS NOTES
"OT Re-Assessment / Re-Certification        Patient: Raquel Espitia   : 1965  Diagnosis/ICD-10 Code:  Impaired mobility and ADLs [Z74.09, Z78.9]  Referring practitioner: Vikram Marie*  Date of Initial Visit: Type: THERAPY  Noted: 12/15/2020  Today's Date: 3/9/2021  Patient seen for 7 sessions      Subjective:   Patient reports: \"I feel down my stairs when it was icy and I really think I hurt my back\"  Pain: 8/10 LBP pre and post   Subjective Questionnaire: 9HPT R 32.35 L: 26.31  Increased L accuracy, speed and targeting   Clinical Progress: improved  Home Program Compliance: Yes  Treatment has included: therapeutic exercise and therapeutic activity    Subjective   Objective     OT Neuro         Assessment & Plan     Assessment  Impairments: abnormal coordination, abnormal gait, abnormal muscle firing, abnormal muscle tone, abnormal or restricted ROM, activity intolerance, impaired balance, impaired physical strength, lacks appropriate home exercise program, pain with function and safety issue  Assessment details: OT re-assessment completed per POC. Pt demonstrates significant improvement in FMC dexterity with speed, accuracy and overall translation/manipulation of affected, R hand. Pt continues to demonstrate most difficulty with V digit control of f/e, abd/adduction and mild numbness along with incorporating digit V with generalized hand motions. Pt also with tightness with weakness through RUE, particularly shoulder. Pt however progressing each session with tolerance. Recommend continued skilled OT services to address above deficits and promote increased independence, strength, & participation in daily tasks.     Prognosis: fair  Functional Limitations: carrying objects, lifting, sleeping, walking, pulling, pushing, uncomfortable because of pain, moving in bed, sitting, standing, stooping, reaching behind back, reaching overhead and unable to perform repetitive tasks  Goals  Plan Goals:   Pt " will score 55 seconds or less on the 9HPT to demonstrate improved accuracy and speed with fine motor coordination by 8 wks.  MET    Pt will increase R  strength to 30 # by 8 wks to demonstrate improved strength and function for daily tasks. MET    Pt will be independent with hand strengthening HEP to increase independence with ADL/IADL performance tasks by 4 wks. PROGRESSING DIFFICULTY; MOSTLY MET    Pt will be independent with hand FMC HEP to increase independence with ADL/IADL performance tasks by 4 wks. PROGRESSING DIFFICULTY; MOSTLY MET    Pt will be independent with R UE strengthening HEP to increase performance in ADL/IADL tasks by 4 wks. PARTIALLY MET        Plan  Planned therapy interventions: ADL retraining, balance/weight-bearing training, fine motor coordination training, flexibility, functional ROM exercises, home exercise program, motor coordination training, neuromuscular re-education, postural training, strengthening, stretching, therapeutic activities, transfer training and IADL retraining  Frequency: 1x week  Duration in visits: 10  Treatment plan discussed with: patient  Plan details: Est. OT POC and goals to reflect above deficits and promote increased independence, safety, engagement and participation in daily tasks.       OT Exercises     Row Name 03/09/21 1000             Precautions    Existing Precautions/Restrictions  fall  -ST         Exercise 1    Exercise Name 1  warm-up w/neural priming, UBE L 4.0  -ST      Time (Minutes) 1  6  -ST         Exercise 2    Exercise Name 2  OT re-assessment   -ST         Exercise 3    Exercise Name 3  isolated digit abd/adduction and MCP, PIP and DIP f/e, most focus with digits III IV and V  R hand   -ST         Exercise 4    Exercise Name 4  lateral  strengthening using resistive clips R hand  -ST         Exercise 5    Exercise Name 5  pincer grasp using resistive clips (digits I/II, I/III, I/IV)  -ST         Exercise 6    Exercise Name 6   pronation/supination, wrist f/e  -ST      Equipment 6  Dumbell  -ST      Weights/Plates 6  2  -ST      Sets 6  2  -ST      Reps 6  10  -ST        User Key  (r) = Recorded By, (t) = Taken By, (c) = Cosigned By    Initials Name Provider Type    Etelvina Hernandez OTR Occupational Therapist        Visit Diagnoses:    ICD-10-CM ICD-9-CM   1. Impaired mobility and ADLs  Z74.09 V49.89    Z78.9    2. Decreased coordination  R27.8 781.3   3. Decreased  strength of right hand  R29.898 729.89       Progress toward previous goals: Partially Met      Recommendations: Continue as planned  Timeframe: 6 weeks  Prognosis to achieve goals: fair    OT Signature: Etelvina Hennessy MS, OTR/L, [facility] License #: [id number]    Based upon review of the patient's progress and continued therapy plan, it is my medical opinion that Raquel Espitia should continue occupational therapy treatment at [facility]  [address] [place] [id number] [phone].    Signature: __________________________________  Vikram Stewart MD    Timed:  Manual Therapy:    0     mins  46138;  Therapeutic Exercise:    13     mins  49908;     Neuromuscular Danish:    0    mins  09520;    Therapeutic Activity:     26     mins  86793;     Self-Care/ADL     0     mins  17099;   Sensory Int. Tech      0     mins 87329;  Ultrasound:     0     mins  98104;    Electrical Stimulation:    0     mins  68054 ( );    Untimed:  Electrical Stimulation:    0     mins  68631 ( );    Timed Treatment:   39   mins   Total Treatment:     39   mins

## 2021-03-11 NOTE — PROGRESS NOTES
Patient: Raquel Espitia  : 1965    Primary Care Provider: Vikram Stewart MD      Chief Complaint: Back pain    History of Present Illness:       Patient is a very nice 55-year-old smoker who is known to the neurosurgical practice for having asymptomatic high-grade left carotid artery stenosis and subsequent CVA and underwent a urgent angiography and received left internal carotid artery stent by Dr. Gregg Patrick.  Procedure performed on 2020 and she is made about full recovery from that.  During that angiogram patient was noted to have a extremely high-grade right internal carotid artery stenosis that was deemed for surgery by Dr. Patrick as well.  Patient was taken to the operating room on 2020 for a right-sided CEA.      Patient is back today after a fall.  Patient has had back pain for some time but this acutely got worse after fall on the ice.  Patient has a history of lumbar surgery done in the 80s by someone in [facility] orthopedics group.  Based off of her MRI that she brought in from August looks like she had a stand-alone fusion but after further questioning she had a posterior laminectomy at 4 4 as well as an anterior fusion where they took bone graft from the hip and went from the front for fusion.    Patient states that she is having pain in her low back and bilateral thighs whenever she walks for distance this is better when she sits.    Patient has not completed physical therapy at this time I have encouraged her to do so.    Patient has slowed way down on smoking is only smoking 1 cigarette every 3 to 4 days.  I have commended her on her efforts and also encouraged her to completely stop    Review of Systems   Constitutional: Negative for activity change, appetite change, chills, diaphoresis, fatigue, fever and unexpected weight change.   HENT: Negative for congestion, dental problem, drooling, ear discharge, ear pain, facial swelling, hearing loss, mouth sores,  nosebleeds, postnasal drip, rhinorrhea, sinus pressure, sinus pain, sneezing, sore throat, tinnitus, trouble swallowing and voice change.    Eyes: Negative for photophobia, pain, discharge, redness, itching and visual disturbance.   Respiratory: Negative for apnea, cough, choking, chest tightness, shortness of breath, wheezing and stridor.    Cardiovascular: Negative for chest pain, palpitations and leg swelling.   Gastrointestinal: Negative for abdominal distention, abdominal pain, anal bleeding, blood in stool, constipation, diarrhea, nausea, rectal pain and vomiting.   Endocrine: Negative for cold intolerance, heat intolerance, polydipsia, polyphagia and polyuria.   Genitourinary: Negative for decreased urine volume, difficulty urinating, dyspareunia, dysuria, enuresis, flank pain, frequency, genital sores, hematuria, menstrual problem, pelvic pain, urgency, vaginal bleeding, vaginal discharge and vaginal pain.   Musculoskeletal: Positive for back pain. Negative for arthralgias, gait problem, joint swelling, myalgias, neck pain and neck stiffness.   Skin: Negative for color change, pallor, rash and wound.   Allergic/Immunologic: Negative for environmental allergies, food allergies and immunocompromised state.   Neurological: Positive for numbness. Negative for dizziness, tremors, seizures, syncope, facial asymmetry, speech difficulty, weakness, light-headedness and headaches.   Hematological: Negative for adenopathy. Does not bruise/bleed easily.   Psychiatric/Behavioral: Negative for agitation, behavioral problems, confusion, decreased concentration, dysphoric mood, hallucinations, self-injury, sleep disturbance and suicidal ideas. The patient is not nervous/anxious and is not hyperactive.        Past Medical History:     Past Medical History:   Diagnosis Date   • Acute bronchitis    • Arthritis    • Asthma    • COPD (chronic obstructive pulmonary disease) (CMS/[facility])    • Diabetes mellitus (CMS/[facility])     TWICE PER  "WEEK CHECKS SUGAR    • Edema    • Fibromyalgia    • GERD (gastroesophageal reflux disease)    • Headache    • Hyperlipidemia    • Hypertension    • Impaired fasting glucose    • Low back pain    • Migraine    • Pain of left calf    • Shingles outbreak     8-9  YEARS AGO- THINK HAD IT    • Stroke (CMS/HCC)     X2 TIA -  BOTH SIDES EFFECTED    • Wears dentures     FULL BUT DOESNT WEAR ALWAYS    • Wears glasses        Family History:     Family History   Problem Relation Age of Onset   • Hypertension Mother    • Stroke Mother    • Cancer Father    • Stroke Father        Social History:    reports that she has been smoking cigarettes. She has been smoking about 0.00 packs per day for the past 40.00 years. She has quit using smokeless tobacco.  Her smokeless tobacco use included chew. She reports that she does not drink alcohol and does not use drugs.   SMOKING STATUS:     Surgical History:     Past Surgical History:   Procedure Laterality Date   • ABDOMINAL SURGERY      X2 ULCER REPAIR    • BACK SURGERY      X2 fusion lumbar   • CAROTID ENDARTERECTOMY Right 2020    Procedure: CAROTID ENDARTERECTOMY WITH EEG RIGHT;  Surgeon: Gregg Patrick MD;  Location: [facility] OR;  Service: Neurosurgery;  Laterality: Right;   •  SECTION     • CHOLECYSTECTOMY     • COLONOSCOPY     • ENDOSCOPY     • HAND SURGERY      RIGHT nerve repair   • INTERVENTIONAL RADIOLOGY PROCEDURE Bilateral 2020    Procedure: CAROTID CEREBRAL ANGIOGRAM BILATERAL;  Surgeon: Gregg Patrick MD;  [facility];  Service: Interventional Radiology;  Laterality: Bilateral;   • NECK SURGERY      cervicle spine plate        Allergies:   Patient has no known allergies.    Physical Exam:    Vital Signs:/70 (BP Location: Right arm, Patient Position: Sitting, Cuff Size: Adult)   Temp 97.5 °F (36.4 °C)   Ht 157.5 cm (62\")   Wt 73.9 kg (163 lb)   BMI 29.81 kg/m²    BMI: Body mass index is 29.81 " kg/m².     GENERAL:           The patient is in no acute distress, and is able to answer all questions appropriately.    Neck:          Supple without lymphadenopathy    Cardiovascular:       Peripheral pulses 2+ at dorsalis pedis and posterior tibialis    Lungs:         Breathing unlabored    Musculoskeletal:            strength is 5 out of 5 bilaterally.        Shoulder abduction is 5 out of 5.         Dorsiflexion is 5/5 Bilaterally       Plantarflexion is 5/5 bilaterally       Hip Flexion 5/5 bilaterally.         The patient´s gait is antalgic using a walker    Neurologic:          The patient is alert and oriented by 3.          Pupils are equal and reactive to light.         Visual fields are full.         Extraocular movements are intact without nystagmus.         There is no evidence of central motor drift. No facial droop.  No difficulty with rapid alternating movements.         Sensation is equal bilaterally with no deficit.           Reflexes:  2+ through out    CRANIAL NERVES:         Cranial nerve II: Visual fields are full to confrontation.       Cranial nerves III, IV and VI: PERRLA DC.  Extraocular movements are intact.  Nystagmus is not present.       Cranial nerve V: Facial sensation is intact to light touch.       Cranial nerve VII: Muscles of facial expression revealed no asymmetry.       Cranial nerve VIII: Hearing is intact to finger rub bilaterally.       Cranial nerve IX and X: Palate elevates symmetrically.        Cranial nerve XI: Shoulder shrug is intact.       Cranial nerve XII: Tongue is midline without evidence of Atrophy or fasciculation.    Medical Decision Making    Data Review:   MRI of the lumbar spine from August 2020 reviewed.  Patient has a relatively normal lumbar spine other than some Modic endplate changes at L2-3.  There is also evidence of a stand-alone fusion at L4-5.  There is no posterior instrumentation    Diagnosis:   1. Tobacco abuse counseling (Primary)     2.  Acute right-sided weakness     3. Symptomatic carotid artery stenosis, left  -     Duplex Carotid Ultrasound CAR; Future     4. Asymptomatic carotid artery stenosis without infarction, right    Treatment Options:   Patient is going to complete physical therapy get a lumbar spine MRI with a flexion-extension x-ray.  We will see her back.  Patient also needs a duplex ultrasound that had been scheduled for 4/22/2021.  Will hopefully have all 3 of those for our review to decrease her travel.    Patient understands if her pain were to improve drastically that she should not pursue the lumbar imaging.    Patient's Body mass index is 29.81 kg/m². BMI is above normal parameters. Recommendations include: educational material and exercise counseling.     Diagnosis Plan   1. Acute bilateral low back pain with bilateral sciatica  MRI Lumbar Spine With & Without Contrast    XR Spine Lumbar Flex & Ext   2. Back pain with history of spinal surgery  MRI Lumbar Spine With & Without Contrast    XR Spine Lumbar Flex & Ext   3. Bilateral carotid artery disease, unspecified type (CMS/HCC)     4. Cerebrovascular accident (CVA), unspecified mechanism (CMS/HCC)     5. Tobacco abuse counseling

## 2021-03-11 NOTE — PATIENT INSTRUCTIONS
Obesity, Adult  Obesity is the condition of having too much total body fat. Being overweight or obese means that your weight is greater than what is considered healthy for your body size. Obesity is determined by a measurement called BMI. BMI is an estimate of body fat and is calculated from height and weight. For adults, a BMI of 30 or higher is considered obese.  Obesity can lead to other health concerns and major illnesses, including:  · Stroke.  · Coronary artery disease (CAD).  · Type 2 diabetes.  · Some types of cancer, including cancers of the colon, breast, uterus, and gallbladder.  · Osteoarthritis.  · High blood pressure (hypertension).  · High cholesterol.  · Sleep apnea.  · Gallbladder stones.  · Infertility problems.  What are the causes?  Common causes of this condition include:  · Eating daily meals that are high in calories, sugar, and fat.  · Being born with genes that may make you more likely to become obese.  · Having a medical condition that causes obesity, including:  ? Hypothyroidism.  ? Polycystic ovarian syndrome (PCOS).  ? Binge-eating disorder.  ? Cushing syndrome.  · Taking certain medicines, such as steroids, antidepressants, and seizure medicines.  · Not being physically active (sedentary lifestyle).  · Not getting enough sleep.  · Drinking high amounts of sugar-sweetened beverages, such as soft drinks.  What increases the risk?  The following factors may make you more likely to develop this condition:  · Having a family history of obesity.  · Being a woman of  descent.  · Being a man of  descent.  · Living in an area with limited access to:  ? Em, recreation centers, or sidewalks.  ? Healthy food choices, such as grocery stores and farmers' markets.  What are the signs or symptoms?  The main sign of this condition is having too much body fat.  How is this diagnosed?  This condition is diagnosed based on:  · Your BMI. If you are an adult with a BMI of 30 or  higher, you are considered obese.  · Your waist circumference. This measures the distance around your waistline.  · Your skinfold thickness. Your health care provider may gently pinch a fold of your skin and measure it.  You may have other tests to check for underlying conditions.  How is this treated?  Treatment for this condition often includes changing your lifestyle. Treatment may include some or all of the following:  · Dietary changes. This may include developing a healthy meal plan.  · Regular physical activity. This may include activity that causes your heart to beat faster (aerobic exercise) and strength training. Work with your health care provider to design an exercise program that works for you.  · Medicine to help you lose weight if you are unable to lose 1 pound a week after 6 weeks of healthy eating and more physical activity.  · Treating conditions that cause the obesity (underlying conditions).  · Surgery. Surgical options may include gastric banding and gastric bypass. Surgery may be done if:  ? Other treatments have not helped to improve your condition.  ? You have a BMI of 40 or higher.  ? You have life-threatening health problems related to obesity.  Follow these instructions at home:  Eating and drinking    · Follow recommendations from your health care provider about what you eat and drink. Your health care provider may advise you to:  ? Limit fast food, sweets, and processed snack foods.  ? Choose low-fat options, such as low-fat milk instead of whole milk.  ? Eat 5 or more servings of fruits or vegetables every day.  ? Eat at home more often. This gives you more control over what you eat.  ? Choose healthy foods when you eat out.  ? Learn to read food labels. This will help you understand how much food is considered 1 serving.  ? Learn what a healthy serving size is.  ? Keep low-fat snacks available.  ? Limit sugary drinks, such as soda, fruit juice, sweetened iced tea, and flavored  milk.  · Drink enough water to keep your urine pale yellow.  · Do not follow a fad diet. Fad diets can be unhealthy and even dangerous.  Physical activity  · Exercise regularly, as told by your health care provider.  ? Most adults should get up to 150 minutes of moderate-intensity exercise every week.  ? Ask your health care provider what types of exercise are safe for you and how often you should exercise.  · Warm up and stretch before being active.  · Cool down and stretch after being active.  · Rest between periods of activity.  Lifestyle  · Work with your health care provider and a dietitian to set a weight-loss goal that is healthy and reasonable for you.  · Limit your screen time.  · Find ways to reward yourself that do not involve food.  · Do not drink alcohol if:  ? Your health care provider tells you not to drink.  ? You are pregnant, may be pregnant, or are planning to become pregnant.  · If you drink alcohol:  ? Limit how much you use to:  § 0-1 drink a day for women.  § 0-2 drinks a day for men.  ? Be aware of how much alcohol is in your drink. In the [place] one drink equals one 12 oz bottle of beer (355 mL), one 5 oz glass of wine (148 mL), or one 1½ oz glass of hard liquor (44 mL).  General instructions  · Keep a weight-loss journal to keep track of the food you eat and how much exercise you get.  · Take over-the-counter and prescription medicines only as told by your health care provider.  · Take vitamins and supplements only as told by your health care provider.  · Consider joining a support group. Your health care provider may be able to recommend a support group.  · Keep all follow-up visits as told by your health care provider. This is important.  Contact a health care provider if:  · You are unable to meet your weight loss goal after 6 weeks of dietary and lifestyle changes.  Get help right away if you are having:  · Trouble breathing.  · Suicidal thoughts or behaviors.  Summary  · Obesity is the  condition of having too much total body fat.  · Being overweight or obese means that your weight is greater than what is considered healthy for your body size.  · Work with your health care provider and a dietitian to set a weight-loss goal that is healthy and reasonable for you.  · Exercise regularly, as told by your health care provider. Ask your health care provider what types of exercise are safe for you and how often you should exercise.  This information is not intended to replace advice given to you by your health care provider. Make sure you discuss any questions you have with your health care provider.  Document Revised: 08/22/2019 Document Reviewed: 08/22/2019  Elsevier Patient Education © 2020 [employer]

## 2021-03-16 NOTE — PROGRESS NOTES
"Occupational Therapy Daily Progress Note  Visit: 8  Date of Initial Visit: Type: THERAPY  Noted: 12/15/2020      Patient: Raquel Espitia   : 1965  Diagnosis/ICD-10 Code:  Impaired mobility and ADLs [Z74.09, Z78.9]  Referring practitioner: Vikram Marie*  Date of Initial Visit: Type: THERAPY  Noted: 12/15/2020  Today's Date: 3/16/2021  Patient seen for 8 sessions      Subjective:   Patient reports: \"my back is feeling much better. I saw the PA at [facility] and he said I bruised my spine when I fell. I'm going to be getting an MRI on . I'm not to lift my arms over chest level or twist, is what he said\"  Pain: 4/10 back pain  Subjective Questionnaire: n/a  Clinical Progress: improved  Home Program Compliance: Yes  Treatment has included: therapeutic activity    Subjective   Objective     OT Neuro         OT Exercises     Row Name 21 0915             Precautions    Existing Precautions/Restrictions  fall  -ST         Exercise 1    Exercise Name 1  notched pegs placing w/L hand, required to change orientation and placement via translation and in-hand manipulation while completing placement w/tweezer prehension  -ST         Exercise 2    Exercise Name 2  removal of notched pegs using green resistance clip R hand   -ST         Exercise 3    Exercise Name 3  digit IV and V, opening hand with resistance of rubber band; straight PIP/DIP   -ST         Exercise 4    Exercise Name 4  digit IV and V, opening hand with resistance of rubber band; flexed PIP and DIP   -ST         Exercise 5    Exercise Name 5  translation/in-hand manipulation focus R hand flexi-puzzle   -ST        User Key  (r) = Recorded By, (t) = Taken By, (c) = Cosigned By    Initials Name Provider Type    Etelvina Hernandez OTR Occupational Therapist           Assessment & Plan     Assessment  Assessment details: Increased difficulty with FMC including using notched pegs with use of tweezers to improve tweezer prehension while " including translation and in-hand manipulation. Pt with difficulty completing this task but improved speed and accuracy when removing notched pegs when resistive green clip for strengthening. Pt continues to have most difficulty with digit V. Will continue to progress difficulty of tasks.     Plan  Plan details: Continue POC and goals as previously est.         Visit Diagnoses:    ICD-10-CM ICD-9-CM   1. Impaired mobility and ADLs  Z74.09 V49.89    Z78.9    2. Decreased coordination  R27.8 781.3   3. Decreased  strength of right hand  R29.898 729.89             Timed:  Manual Therapy:    0     mins  68743;  Therapeutic Exercise:    0     mins  90495;     Neuromuscular Danish:    0    mins  07810;    Therapeutic Activity:     40     mins  30034;     Self-Care/ADL     0     mins  00320;   Sensory Int. Tech      0     mins 86702;  Ultrasound:     0     mins  52869;    Electrical Stimulation:    0     mins  68857 ( );    Untimed:  Electrical Stimulation:    0     mins  12771 ( );    Timed Treatment:   40   mins   Total Treatment:     40   mins    OT Signature: Etelvina Hennessy MS, OTR/L, CDP  [place] License #: [id number]

## 2021-03-16 NOTE — PROGRESS NOTES
Physical Therapy Daily Progress Note  Visit: 7  Date of Initial Visit: Type: THERAPY  Noted: 2020    Patient: Raquel Espitia   : 1965  Diagnosis/ICD-10 Code:  Impaired functional mobility, balance, gait, and endurance [Z74.09]  Referring practitioner: JULIA Cook*  Date of Initial Visit: Type: THERAPY  Noted: 2020  Today's Date: 3/16/2021  Patient seen for 7 sessions      Subjective:   Patient reports: she continues with weakness in the R leg post fall.   Pain: 7/10-LB  Clinical Progress: worse  Home Program Compliance: Yes  Treatment has included: therapeutic exercise and therapeutic activity    Objective   See Exercise, Manual, and Modality Logs for complete treatment.    PT Neuro          Assessment & Plan     Assessment  Assessment details: Patient continues with R LE weakness, specifically at iliopsoas. She is unable to maintain a hooklying position without R leg falling out of position. This is the same with an extended leg as well. Due to these issues, as well as sensory deficits into the R thigh, she more than likely has an L2/3 herniation. She was encouraged to maintain neutral spine positioning and avoid increased forward flexion.     Plan  Plan details: Patient to continue with PT services to improve gait, balance, strength, transfers and overall functional mobility.          Timed:  Manual Therapy:            0     mins  83267;  Therapeutic Exercise:    30    mins  17282;     Neuromuscular Danish:    0    mins  45540;    Therapeutic Activity:      10     mins  38689;     Gait Trainin    mins  89958;     Electrical Stimulation:    0    mins  48031 ( );     Untimed:  Canalith Repositioning techniques _0_ [id number]      Timed Treatment:   40   mins   Total Treatment:     40   mins      Lida Meyers, PT, DPT, MSCS, CDP  [place] License #: [id number]  Physical Therapist

## 2021-03-23 NOTE — PROGRESS NOTES
Physical Therapy Daily Progress Note  Visit: 8  Date of Initial Visit: Type: THERAPY  Noted: 2020    Patient: Raquel Espitia   : 1965  Diagnosis/ICD-10 Code:  Impaired functional mobility, balance, gait, and endurance [Z74.09]  Referring practitioner: JULIA Cook*  Date of Initial Visit: Type: THERAPY  Noted: 2020  Today's Date: 3/23/2021  Patient seen for 8 sessions      Subjective:   Patient reports: 2 new falls within the last week. These were walking in her hallway at home without rollator.   Pain: 2/10 - global   Clinical Progress: worse  Home Program Compliance: Yes  Treatment has included: therapeutic exercise and neuromuscular re-education    Objective   See Exercise, Manual, and Modality Logs for complete treatment.    PT Neuro          Assessment & Plan     Assessment  Assessment details: Patient relates walking at home with R LE collapsing causing her to fall. Patient demonstrates increased weakness throughout RLE since her initial fall on steps. Pt does not have continuous sensation deficits, only motor. She will continue with therapy until MRI and f/u with physician.     Plan  Plan details: Patient to continue with PT services to improve gait, balance, strength, transfers and overall functional mobility.          Timed:  Manual Therapy:            0     mins  65815;  Therapeutic Exercise:    8    mins  43134;     Neuromuscular Danish:    30    mins  32109;    Therapeutic Activity:      0     mins  36361;     Gait Trainin    mins  49397;     Electrical Stimulation:    0    mins  02408 ( );     Untimed:  Canalith Repositioning techniques _0_ [id number]      Timed Treatment:   38   mins   Total Treatment:     38   mins      Lida Meyers, PT, DPT, MSCS, CDP  [place] License #: [id number]  Physical Therapist

## 2021-03-23 NOTE — PROGRESS NOTES
Occupational Therapy Daily Progress Note  Visit: 9  Date of Initial Visit: Type: THERAPY  Noted: 12/15/2020      Patient: Raquel Espitia   : 1965  Diagnosis/ICD-10 Code:  Impaired mobility and ADLs [Z74.09, Z78.9]  Referring practitioner: Vikram Marie*  Date of Initial Visit: Type: THERAPY  Noted: 12/15/2020  Today's Date: 3/23/2021  Patient seen for 9 sessions      Subjective:   Patient reports: Pt states falling twice at home  Pain: 4/10 R thumb pre and post 1/10  Subjective Questionnaire: n/a  Clinical Progress: improved  Home Program Compliance: Yes  Treatment has included: therapeutic exercise and therapeutic activity    Subjective   Objective     OT Neuro         OT Exercises     Row Name 21 1000             Precautions    Existing Precautions/Restrictions  fall  -ST         Exercise 1    Exercise Name 1  warm-up with neural priming and UE ROM and strengthening UBE L 4.0  -ST      Time (Minutes) 1  6  -ST         Exercise 2    Exercise Name 2  placing notched pegs with digit I/V only R hand   -ST         Exercise 3    Exercise Name 3  placement of pins, collars and washers digits I/V only R hand   -ST         Exercise 4    Exercise Name 4  flexi-bar, pronation/supination, wrist f/e   -ST      Resistance 4  Red  -ST      Sets 4  2  -ST      Reps 4  20  -ST         Exercise 5    Exercise Name 5  standing while performing UE strengthening; CP/OH press   -ST      Equipment 5  Dowel  -ST      Weights/Plates 5  3  -ST      Sets 5  2  -ST      Reps 5  10  -ST        User Key  (r) = Recorded By, (t) = Taken By, (c) = Cosigned By    Initials Name Provider Type    Etelvina Hernandez OTR Occupational Therapist           Assessment & Plan     Assessment  Assessment details: Pt improving with overall RUE strength and coordination in FMC tasks by tolerating increased difficulty of tasks. Pt able to maintain balance on stable surface while performing tbar exercises in OH and CP positions but  fatigues quickly. Pt notes thenar eminence soreness with repetitive tasks.    Plan  Plan details: Continue w/POC and goals as previously est.         Visit Diagnoses:    ICD-10-CM ICD-9-CM   1. Impaired mobility and ADLs  Z74.09 V49.89    Z78.9    2. Decreased coordination  R27.8 781.3   3. Decreased  strength of right hand  R29.898 729.89             Timed:  Manual Therapy:    0     mins  98488;  Therapeutic Exercise:    15     mins  77183;     Neuromuscular Danish:    0    mins  45614;    Therapeutic Activity:     25     mins  09789;     Self-Care/ADL     0     mins  66738;   Sensory Int. Tech      0     mins 93050;  Ultrasound:     0     mins  11760;    Electrical Stimulation:    0     mins  11790 ( );    Untimed:  Electrical Stimulation:    0     mins  72598 ( );    Timed Treatment:   40   mins   Total Treatment:     40   mins    OT Signature: Etelvina Hennessy MS, OTR/L, CDP  [place] License #: [id number]

## 2021-03-30 NOTE — PROGRESS NOTES
Physical Therapy Daily Progress Note  Visit: 9  Date of Initial Visit: Type: THERAPY  Noted: 2020    Patient: Raquel Espitia   : 1965  Diagnosis/ICD-10 Code:  Cerebrovascular accident (CVA), unspecified mechanism (CMS/HCC) [I63.9]  Referring practitioner: JULIA Cook*  Date of Initial Visit: Type: THERAPY  Noted: 2020  Today's Date: 3/30/2021  Patient seen for 9 sessions      Subjective:   Patient reports: she stepped wrong and tweaked her ankle but otherwise is feeling better.   Pain: 0/10  Clinical Progress: unchanged  Home Program Compliance: Yes  Treatment has included: therapeutic exercise and neuromuscular re-education    Objective   See Exercise, Manual, and Modality Logs for complete treatment.    PT Neuro          Assessment & Plan     Assessment  Assessment details: During neutral spine posture, patient reports RLE numbness following functional squats. No increase in pain, only numbness and weakness.     Plan  Plan details: Patient to continue with PT services to improve gait, balance, strength, transfers and overall functional mobility.          Timed:  Manual Therapy:            0     mins  71318;  Therapeutic Exercise:    10    mins  34870;     Neuromuscular Danish:    30    mins  53257;    Therapeutic Activity:      0     mins  43673;     Gait Trainin    mins  13611;     Electrical Stimulation:    0    mins  67323 ( );     Untimed:  Canalith Repositioning techniques _0_ [id number]      Timed Treatment:   40   mins   Total Treatment:     40   mins      Lida Meyers, PT, DPT, MSCS, CDP  [place] License #: [id number]  Physical Therapist

## 2021-03-30 NOTE — PROGRESS NOTES
"Occupational Therapy Daily Progress Note  Visit: 10  Date of Initial Visit: Type: THERAPY  Noted: 12/15/2020      Patient: Raquel Espitia   : 1965  Diagnosis/ICD-10 Code:  Impaired mobility and ADLs [Z74.09, Z78.9]  Referring practitioner: Vikram Marie*  Date of Initial Visit: Type: THERAPY  Noted: 12/15/2020  Today's Date: 3/30/2021  Patient seen for 10 sessions      Subjective:   Patient reports: \"I'm really tired today from my granddaughter keeping me up but my back feels better\"   Pain: 0/10  Subjective Questionnaire: n/a  Clinical Progress: improved  Home Program Compliance: Yes  Treatment has included: therapeutic exercise and therapeutic activity    Subjective   Objective     OT Neuro         OT Exercises     Row Name 21 0945             Precautions    Existing Precautions/Restrictions  fall  -ST         Exercise 1    Exercise Name 1  warm-up with neural priming and UE ROM and strengthening UBE L 4.0  -ST      Time (Minutes) 1  6  -ST         Exercise 2    Exercise Name 2  CP seated  -ST      Equipment 2  Dowel  -ST      Weights/Plates 2  4  -ST      Sets 2  2  -ST      Reps 2  10  -ST         Exercise 3    Exercise Name 3  wrist extension  -ST      Equipment 3  Dowel  -ST      Weights/Plates 3  4  -ST      Sets 3  2  -ST      Reps 3  10  -ST         Exercise 4    Exercise Name 4  OH press to 90  -ST      Equipment 4  Dowel  -ST      Weights/Plates 4  4  -ST      Sets 4  2  -ST      Reps 4  10  -ST         Exercise 5    Exercise Name 5  pronation/supination, flexi-bar  -ST      Resistance 5  Green  -ST      Sets 5  1  -ST      Reps 5  15  -ST         Exercise 6    Exercise Name 6  scapular squeeze with pull backs, bent elbows  -ST      Equipment 6  Theraband  -ST      Resistance 6  Red  -ST      Sets 6  2  -ST      Reps 6  10  -ST         Exercise 7    Exercise Name 7  scapular squeezes with straight elbows, tricep pull backs  -ST      Equipment 7  Theraband  -ST      Sets 7  2  -ST  "     Reps 7  10  -ST         Exercise 8    Exercise Name 8  orientation, placement and translation digits I/V using pins, washers and collars R hand   -ST         Exercise 9    Exercise Name 9  orientation, placement and translation digits I/V using pins, washers and collars R hand using tweezer prehension  -ST        User Key  (r) = Recorded By, (t) = Taken By, (c) = Cosigned By    Initials Name Provider Type    Etelvina Hernandez OTR Occupational Therapist           Assessment & Plan     Assessment  Assessment details: Pt tolerated increased wt and resistance this date with strengthening tasks. Pt improving with digit V prehension and use with tweezers and pincer grasp in combo w/digit I. Pt demo weak scapular stabilization, issued red tband HEP for home use.    Plan  Plan details: Continue POC and goals as previously est.         Visit Diagnoses:    ICD-10-CM ICD-9-CM   1. Impaired mobility and ADLs  Z74.09 V49.89    Z78.9    2. Decreased coordination  R27.8 781.3   3. Decreased  strength of right hand  R29.898 729.89             Timed:  Manual Therapy:    0     mins  91239;  Therapeutic Exercise:    30     mins  18097;     Neuromuscular Danish:    0    mins  80754;    Therapeutic Activity:     15     mins  13599;     Self-Care/ADL     0     mins  04830;   Sensory Int. Tech      0     mins 35585;  Ultrasound:     0     mins  77561;    Electrical Stimulation:    0     mins  46369 ( );    Untimed:  Electrical Stimulation:    0     mins  10928 ( );    Timed Treatment:   45   mins   Total Treatment:     45   mins    OT Signature: Etelvina Hennessy MS, OTR/L, CDP  [place] License #: [id number]

## 2021-04-13 NOTE — PROGRESS NOTES
"OT Re-Assessment / Re-Certification        Patient: Raquel Espitia   : 1965  Diagnosis/ICD-10 Code:  Impaired mobility and ADLs [Z74.09, Z78.9]  Referring practitioner: Vikram Marie*  Date of Initial Visit: Type: THERAPY  Noted: 12/15/2020  Today's Date: 2021  Patient seen for 11 sessions      Subjective:   Patient reports: \"I'll be following up with my neuro sx next wk. My back is still killing me but I think overall my hand and arm is getting better\"  Pain: 5/10 pre and post LBP   Subjective Questionnaire: 9HPT R: 27.43 L: 26.60  Significantly improved manipulation and translation R hand since last POC  Clinical Progress: improved  Home Program Compliance: Yes  Treatment has included: therapeutic exercise and therapeutic activity    Subjective   Objective     OT Neuro         Assessment & Plan     Assessment  Impairments: abnormal coordination, abnormal gait, abnormal muscle firing, abnormal muscle tone, abnormal or restricted ROM, activity intolerance, impaired balance, impaired physical strength, lacks appropriate home exercise program, pain with function and safety issue  Assessment details: OT re-assessment completed per POC. Pt demonstrates significant improvement in FMC dexterity with speed, accuracy and overall translation/manipulation of affected, R hand. Pt continues to demonstrate most difficulty with V digit control of f/e, abd/adduction and mild numbness digit V and digit I which affects grasp of utensils, pen/pencil, etc. Tightness in shoulder improving but continues to need focused stretching at home on regular basis. Pt to see her neurosurgeon next wk and f/u with therapy as to whether she will take a break or continue with therapy w/current POC. Recommend continued skilled OT services to address above deficits and promote increased independence, strength, & participation in daily tasks.     Prognosis: fair  Functional Limitations: carrying objects, lifting, sleeping, " walking, pulling, pushing, uncomfortable because of pain, moving in bed, sitting, standing, stooping, reaching behind back, reaching overhead and unable to perform repetitive tasks  Goals  Plan Goals:   Pt will score 55 seconds or less on the 9HPT to demonstrate improved accuracy and speed with fine motor coordination by 8 wks.  MET    Pt will increase R  strength to 30 # by 8 wks to demonstrate improved strength and function for daily tasks. MET    Pt will be independent with hand strengthening HEP to increase independence with ADL/IADL performance tasks by 4 wks.  MET    Pt will be independent with hand FMC HEP to increase independence with ADL/IADL performance tasks by 4 wks. PROGRESSING DIFFICULTY; MOSTLY MET    Pt will be independent with R UE strengthening HEP to increase performance in ADL/IADL tasks by 4 wks. PARTIALLY MET        Plan  Planned therapy interventions: ADL retraining, balance/weight-bearing training, fine motor coordination training, flexibility, functional ROM exercises, home exercise program, motor coordination training, neuromuscular re-education, postural training, strengthening, stretching, therapeutic activities, transfer training and IADL retraining  Frequency: 1x week  Duration in visits: 4  Treatment plan discussed with: patient  Plan details: Continue OT POC and goals to reflect above deficits and promote increased independence, safety, engagement and participation in daily tasks.       OT Exercises     Row Name 04/13/21 1000             Precautions    Existing Precautions/Restrictions  fall  -ST         Exercise 1    Exercise Name 1  warm-up with neural priming and UE ROM and strengthening UBE L 4.5  -ST      Time (Minutes) 1  6  -ST         Exercise 2    Exercise Name 2  OT re-assessment completed per POC   -ST         Exercise 3    Exercise Name 3  pronation/supination; flexi bar  -ST      Resistance 3  Green;Blue  -ST      Sets 3  2  -ST      Reps 3  10  -ST         Exercise 4     Exercise Name 4  HW, focus on increasing legibility through spacing and sizing   -ST         Exercise 5    Exercise Name 5  pre HW activities to improve DIP/PIP f/e  -ST        User Key  (r) = Recorded By, (t) = Taken By, (c) = Cosigned By    Initials Name Provider Type    Etelvina Hernandez OTR Occupational Therapist          Visit Diagnoses:    ICD-10-CM ICD-9-CM   1. Impaired mobility and ADLs  Z74.09 V49.89    Z78.9    2. Decreased coordination  R27.8 781.3   3. Decreased  strength of right hand  R29.898 729.89       Progress toward previous goals: Partially Met      Recommendations: Continue as planned  Timeframe: 1 month  Prognosis to achieve goals: fair    OT Signature: Etelvina Hennessy MS, OTR/L, [facility] License #: [id number]    Based upon review of the patient's progress and continued therapy plan, it is my medical opinion that Raquel Espitia should continue occupational therapy treatment at [facility]  [address] [place] [id number] [phone].    Signature: __________________________________  Vikram Stewart MD    Timed:  Manual Therapy:    0     mins  04817;  Therapeutic Exercise:    15     mins  46085;     Neuromuscular Danish:    0    mins  15418;    Therapeutic Activity:     25     mins  17018;     Self-Care/ADL     0     mins  94046;   Sensory Int. Tech      0     mins 81020;  Ultrasound:     0     mins  07504;    Electrical Stimulation:    0     mins  55643 ( );    Untimed:  Electrical Stimulation:    0     mins  25834 ( );    Timed Treatment:   40   mins   Total Treatment:     40   mins

## 2021-04-13 NOTE — PROGRESS NOTES
PT Re-Assessment / Re-Certification  Visit: 10  Date of Initial Visit: Type: THERAPY  Noted: 2020    Patient: Raquel Espitia   : 1965  Diagnosis/ICD-10 Code:  Cerebrovascular accident (CVA), unspecified mechanism (CMS/HCC) [I63.9]  Referring practitioner: JULIA Cook*  Date of Initial Visit: Type: THERAPY  Noted: 2020  Today's Date: 2021  Patient seen for 10 sessions      Subjective:   Patient reports: she is in more pain today.   Pain: 6/10  Clinical Progress: improved  Home Program Compliance: Yes  Treatment has included: therapeutic exercise and neuromuscular re-education    Objective          Strength/Myotome Testing     Left Hip   Planes of Motion   Flexion: 4+  Extension: 4  Abduction: 4    Right Hip   Planes of Motion   Flexion: 3  Extension: 2  Abduction: 3    Left Knee   Flexion: 5  Extension: 5    Right Knee   Flexion: 4  Extension: 4    Left Ankle/Foot   Dorsiflexion: 5    Right Ankle/Foot   Dorsiflexion: 5    Ambulation     Observational Gait     Additional Observational Gait Details  Slowed gait pattern with use of RW. Walking > 5 min, pt demo's buckling at R knee       See Exercise, Manual, and Modality Logs for complete treatment.    PT Neuro  TU.86  sec  10 M: 17.43 sec         Assessment & Plan     Assessment  Impairments: abnormal coordination, abnormal gait, activity intolerance, impaired balance, impaired physical strength and safety issue  Assessment details: Patient fell approximately 6 weeks ago on ice/snow. Since that time her gait speed and pattern has improved, however her pain has not. She continues to report higher levels of pain, along with intermittent numbness in the L2-3 dermatome. She also displays R knee buckling with standing or walking >5 min. In order to conserve PT visits for the year, patient will be placed on hold until she follows up with neurosurgery.    Prognosis: fair  Functional Limitations: carrying objects, walking, standing and  stooping  Goals  Plan Goals: STG (6 visits)  1. Patient will report compliance with initial HEP. MET  2. Patient to improve NATH balance score to >/= 15 /56 to decrease client's risk of falls. MET  3. Patient to perform TUG within 22 sec without LOB for improved functional mobility. MET  4. Patient to ambulate 10 meters without AD within 20 sec without LOB for improved       gait eleazar and functional mobility. MET    LTG (12 visits)  1. Patient will be I with final HEP. MET  2. Patient to improve NATH balance score to >/= 20 /56 to decrease client's risk of falls. MET  3. Patient to perform TUG within < 20 sec without LOB for improved functional mobility. NOT MET   4. Patient to ambulate 10 meters without AD within < 18 sec without LOB for improved gait eleazar and functional mobility. NOT MET          Plan  Therapy options: will be seen for skilled physical therapy services  Planned modality interventions: TENS  Planned therapy interventions: ADL retraining, balance/weight-bearing training, flexibility, gait training, manual therapy, neuromuscular re-education, motor coordination training, postural training, strengthening, stretching, therapeutic activities, transfer training and home exercise program  Frequency: 1x week  Duration in visits: 4  Treatment plan discussed with: patient  Plan details: Patient will follow up with MD and return if needed.         Visit Diagnoses:    ICD-10-CM ICD-9-CM   1. Cerebrovascular accident (CVA), unspecified mechanism (CMS/HCC)  I63.9 434.91   2. Impaired functional mobility, balance, gait, and endurance  Z74.09 V49.89       Progress toward previous goals: Partially Met      Recommendations: Continue with recommendations follow up with MD, call if return is warranted.   Timeframe: 1 month    PT Signature: Lida Meyers, PT, DPT, MSCS, CDP  [place] License #: [id number]    Based upon review of the patient's progress and continued therapy plan, it is my medical opinion that Raquel  Nupur should continue physical therapy treatment at [facility]  [address] [place] [id number] [phone].    Signature: __________________________________  Christian Rashid APRN    Timed:  Manual Therapy:    0     mins  87986;  Therapeutic Exercise:    25     mins  68441;     Neuromuscular Danish:    20    mins  90588;    Therapeutic Activity:     0     mins  79954;     Gait Trainin     mins  57186;     Electrical Stimulation:    0     mins  94860 ( );    Untimed:  Canalith Repositioning   0 mins  39934    Timed Treatment:   45   mins   Total Treatment:     45   mins

## 2021-04-22 PROBLEM — M54.50 CHRONIC BILATERAL LOW BACK PAIN WITHOUT SCIATICA: Status: ACTIVE | Noted: 2021-01-01

## 2021-04-22 PROBLEM — G89.29 CHRONIC BILATERAL LOW BACK PAIN WITHOUT SCIATICA: Status: ACTIVE | Noted: 2021-01-01

## 2021-04-22 NOTE — PROGRESS NOTES
NAME: KEVIN MOSCOSO   DOS: 2021  : 1965  PCP: Vikram Stewart MD    Chief Complaint:    Chief Complaint   Patient presents with   • Back Pain   • Follow-up       History of Present Illness:  55 y.o. female  smoker who is known to the neurosurgical practice for having asymptomatic high-grade left carotid artery stenosis and subsequent CVA and underwent a urgent angiography and received left internal carotid artery stent by Dr. Gregg Patrick.  Procedure performed on 2020 and she is made about full recovery from that.  During that angiogram patient was noted to have a extremely high-grade right internal carotid artery stenosis that was deemed for surgery by Dr. Patrick as well.  Patient was taken to the operating room on 2020 for a right-sided CEA.       Patient is back today after a fall.  Patient has had back pain for some time but this acutely got worse after fall on the ice.  Patient has a history of lumbar surgery done in the 80s by someone in [facility] orthopedics group.  Based off of her MRI that she brought in from August looks like she had a stand-alone fusion but after further questioning she had a posterior laminectomy at 4 5 as well as an anterior fusion where they took bone graft from the hip and went from the front for fusion.     Patient states that she is having pain in her low back and bilateral thighs whenever she walks for distance this is better when she sits.    Patient is back with MRI.  Patient also had flexion-extension x-rays show stability of the anterior fusion and posterior lumbar laminectomy.    MRI data set show relatively moderate degenerative changes other than right were disc herniation at L1-2.  No obvious foraminal stenosis that would require intervention.  Conus is not compressed    PMHX  Allergies:  No Known Allergies  Medications    Current Outpatient Medications:   •  albuterol sulfate  (90 Base) MCG/ACT inhaler, Inhale 2 puffs  Every 4 (Four) Hours As Needed for Wheezing., Disp: 6.7 g, Rfl: 11  •  aspirin 81 MG tablet, Take 81 mg by mouth Daily. CONTINUE, Disp: , Rfl:   •  atorvastatin (LIPITOR) 80 MG tablet, Take 1 tablet by mouth Every Night., Disp: 30 tablet, Rfl: 5  •  baclofen (LIORESAL) 10 MG tablet, Take 1 tablet by mouth 2 (Two) Times a Day As Needed for Muscle Spasms. (Patient taking differently: Take 10 mg by mouth As Needed for Muscle Spasms.), Disp: 40 tablet, Rfl: 5  •  Cetirizine-Pseudoephedrine (WAL-ZYR D PO), TAKE 1 TABLET BY MOUTH TWICE A DAY, Disp: , Rfl:   •  cetirizine-pseudoephedrine (ZyrTEC-D) 5-120 MG per 12 hr tablet, Take 1 tablet by mouth 2 (Two) Times a Day. (Patient taking differently: Take 1 tablet by mouth 2 (Two) Times a Day As Needed.), Disp: 14 tablet, Rfl: 0  •  diazePAM (VALIUM) 10 MG tablet, Take 1 tablet by mouth Every 8 (Eight) Hours As Needed for Anxiety., Disp: 75 tablet, Rfl: 2  •  Empagliflozin 25 MG tablet, Take 25 mg by mouth Daily., Disp: 30 tablet, Rfl: 5  •  gabapentin (NEURONTIN) 800 MG tablet, Take 0.5 tablets by mouth 3 (Three) Times a Day., Disp: , Rfl: 0  •  HYDROcodone-acetaminophen (NORCO)  MG per tablet, Take 1 tablet by mouth 4 (Four) Times a Day As Needed for Moderate Pain ., Disp: , Rfl: 0  •  metFORMIN (GLUCOPHAGE) 1000 MG tablet, Take 1 tablet by mouth 2 (Two) Times a Day With Meals., Disp: 60 tablet, Rfl: 2  •  metoprolol tartrate (LOPRESSOR) 25 MG tablet, Take 0.5 tablets by mouth 2 (Two) Times a Day., Disp: 60 tablet, Rfl: 11  •  nicotine (Nicotine Step 1) 21 MG/24HR patch, Place 1 patch on the skin as directed by provider Daily., Disp: 14 patch, Rfl: 5  •  nortriptyline (PAMELOR) 25 MG capsule, Take 1 capsule by mouth Every Night., Disp: 30 capsule, Rfl: 11  •  omeprazole (priLOSEC) 40 MG capsule, Take 1 capsule by mouth Daily., Disp: 30 capsule, Rfl: 11  •  SITagliptin (JANUVIA) 25 MG tablet, Take 25 mg by mouth Daily., Disp: , Rfl:   •  tiZANidine (ZANAFLEX) 4 MG  tablet, Daily., Disp: , Rfl:   •  triamcinolone (KENALOG) 0.5 % cream, Apply  topically to the appropriate area as directed 3 (Three) Times a Day., Disp: 30 g, Rfl: 2  •  varenicline (Chantix Continuing Month Jin) 1 MG tablet, Take 1 tablet by mouth 2 (Two) Times a Day., Disp: 60 tablet, Rfl: 11  No current facility-administered medications for this visit.  Past Medical History:  Past Medical History:   Diagnosis Date   • Acute bronchitis    • Arthritis    • Asthma    • COPD (chronic obstructive pulmonary disease) (CMS/[facility])    • Diabetes mellitus (CMS/[facility])     TWICE PER WEEK CHECKS SUGAR    • Edema    • Fibromyalgia    • GERD (gastroesophageal reflux disease)    • Headache    • Hyperlipidemia    • Hypertension    • Impaired fasting glucose    • Low back pain    • Migraine    • Pain of left calf    • Shingles outbreak     8-9  YEARS AGO- THINK HAD IT    • Stroke (CMS/[facility])     X2 TIA -  BOTH SIDES EFFECTED    • Wears dentures     FULL BUT DOESNT WEAR ALWAYS    • Wears glasses      Past Surgical History:  Past Surgical History:   Procedure Laterality Date   • ABDOMINAL SURGERY      X2 ULCER REPAIR    • BACK SURGERY      X2 fusion lumbar   • CAROTID ENDARTERECTOMY Right 2020    Procedure: CAROTID ENDARTERECTOMY WITH EEG RIGHT;  Surgeon: Gregg Patrick MD;  Location:  [facility];  Service: Neurosurgery;  Laterality: Right;   •  SECTION     • CHOLECYSTECTOMY     • COLONOSCOPY     • ENDOSCOPY     • HAND SURGERY      RIGHT nerve repair   • INTERVENTIONAL RADIOLOGY PROCEDURE Bilateral 2020    Procedure: CAROTID CEREBRAL ANGIOGRAM BILATERAL;  Surgeon: Gregg Patrick MD;  Location:  [facility] INVASIVE LOCATION;  Service: Interventional Radiology;  Laterality: Bilateral;   • NECK SURGERY      cervicle spine plate      Social Hx:  Social History     Tobacco Use   • Smoking status: Light Tobacco Smoker     Packs/day: 0.00     Years: 40.00     Pack years: 0.00     Types: Cigarettes   • Smokeless  tobacco: Former User     Types: Chew   • Tobacco comment: Patient states that she is down to 1 pack a month.    Vaping Use   • Vaping Use: Never used   Substance Use Topics   • Alcohol use: No   • Drug use: No     Family Hx:  Family History   Problem Relation Age of Onset   • Hypertension Mother    • Stroke Mother    • Cancer Father    • Stroke Father      Review of Systems:        Review of Systems   Constitutional: Negative for activity change, appetite change, chills, diaphoresis, fatigue, fever and unexpected weight change.   HENT: Negative for congestion, dental problem, drooling, ear discharge, ear pain, facial swelling, hearing loss, mouth sores, nosebleeds, postnasal drip, rhinorrhea, sinus pressure, sinus pain, sneezing, sore throat, tinnitus, trouble swallowing and voice change.    Eyes: Negative for photophobia, pain, discharge, redness, itching and visual disturbance.   Respiratory: Negative for apnea, cough, choking, chest tightness, shortness of breath, wheezing and stridor.    Cardiovascular: Negative for chest pain, palpitations and leg swelling.   Gastrointestinal: Negative for abdominal distention, abdominal pain, anal bleeding, blood in stool, constipation, diarrhea, nausea, rectal pain and vomiting.   Endocrine: Negative for cold intolerance, heat intolerance, polydipsia, polyphagia and polyuria.   Genitourinary: Negative for decreased urine volume, difficulty urinating, dyspareunia, dysuria, enuresis, flank pain, frequency, genital sores, hematuria, menstrual problem, pelvic pain, urgency, vaginal bleeding, vaginal discharge and vaginal pain.   Musculoskeletal: Positive for back pain and gait problem. Negative for arthralgias, joint swelling, myalgias, neck pain and neck stiffness.   Skin: Negative for color change, pallor, rash and wound.   Allergic/Immunologic: Negative for environmental allergies, food allergies and immunocompromised state.   Neurological: Positive for weakness and numbness  (right leg). Negative for dizziness, tremors, seizures, syncope, facial asymmetry, speech difficulty, light-headedness and headaches.   Hematological: Negative for adenopathy. Does not bruise/bleed easily.   Psychiatric/Behavioral: Negative for agitation, behavioral problems, confusion, decreased concentration, dysphoric mood, hallucinations, self-injury, sleep disturbance and suicidal ideas. The patient is not nervous/anxious and is not hyperactive.             Physical Examination:  Vitals:    04/22/21 1411   BP: 110/70   Temp: 97.1 °F (36.2 °C)      General Appearance:   Well developed, well nourished, well groomed, alert, and cooperative.  Neurological examination:  Neurologic Exam                GENERAL:           The patient is in no acute distress, and is able to answer all questions appropriately.     Neck:           Supple without lymphadenopathy     Cardiovascular:       Peripheral pulses 2+ at dorsalis pedis and posterior tibialis     Lungs:          Breathing unlabored     Musculoskeletal:             strength is 5 out of 5 bilaterally.        Shoulder abduction is 5 out of 5.         Dorsiflexion is 5/5 Bilaterally       Plantarflexion is 5/5 bilaterally       Hip Flexion 5/5 bilaterally.         The patient´s gait is antalgic using a walker     Neurologic:           The patient is alert and oriented by 3.          Pupils are equal and reactive to light.         Visual fields are full.         Extraocular movements are intact without nystagmus.         There is no evidence of central motor drift. No facial droop.  No difficulty with rapid alternating movements.         Sensation is equal bilaterally with no deficit.           Reflexes:  2+ through out     CRANIAL NERVES:          Cranial nerve II: Visual fields are full to confrontation.       Cranial nerves III, IV and VI: PERRLA DC.  Extraocular movements are intact.  Nystagmus is not present.       Cranial nerve V: Facial sensation is intact to  light touch.       Cranial nerve VII: Muscles of facial expression revealed no asymmetry.       Cranial nerve VIII: Hearing is intact to finger rub bilaterally.       Cranial nerve IX and X: Palate elevates symmetrically.        Cranial nerve XI: Shoulder shrug is intact.       Cranial nerve XII: Tongue is midline without evidence of Atrophy or fasciculation.     Review of Imaging/DATA:  MRI of the lumbar spine shows old fusion and laminectomy at L4-5    Patient does have a rightward L1-2 disc herniation  Diagnoses/Plan:    Ms. Espitia is a 55 y.o. female currently patient continues to smoke and will not have any good surgical option currently.  Patient still recovering from stroke and right-sided CEA.  Patient has not yet done injections which have encouraged her to do so.  Patient will continue with physical therapy and conservative measures.    If these fail to help and patient stop smoking we can consider other options.

## 2021-05-06 NOTE — TELEPHONE ENCOUNTER
Provider: Seth   Caller: Patient  Time of call:   9:16  Phone #: [phone]   Surgery:  Carotid endarterectomy with EEG right.   Surgery Date:  12/30/2020  Last visit:  4/22/2021   Next visit: NA    Reason for call:   Patient would like to know if it is okay for her to get the COVID shot?

## 2021-05-06 NOTE — TELEPHONE ENCOUNTER
Caller: Raquel Espitia    Relationship to patient: Self    Best call back number: [phone]     Additional information or concerns: IS IT OKAY FOR ME TO GET THE COVID SHOT?

## 2021-05-06 NOTE — TELEPHONE ENCOUNTER
Provider: ALEX  Caller: KEVIN SAINI  Relationship to Patient: SELF    Phone Number: [phone]  Reason for Call: PT CALLED WANTING TO VERIFY IT IS OK FOR HER TO RECEIVE THE COVID VACCINE PLEASE CALL PT TO ADVISE

## 2021-05-12 NOTE — TELEPHONE ENCOUNTER
From our standpoint, patient can discontinue Plavix Plavix 5-7 days prior.  Okay to restart the Plavix the day after the injection.

## 2021-05-12 NOTE — TELEPHONE ENCOUNTER
Caller: KEVIN MOSCOSO     Relationship to patient: SELF    Best call back number: [phone]    Patient is needing: PATIENT IS CALLING NEEDING PERMISSION TO STOP TAKING HER PLAVIX 5 DAYS BEFORE HER STEROID INJECTIONS, SHE IS SCHEDULE FOR 06-03-21. SHE ALSO NEEDS TO KNOW HOW LONG AFTER HER FIRST INJECTION SHE SHOULD BE OFF THE MEDICATION. PLEASE ADVISE.

## 2021-05-12 NOTE — TELEPHONE ENCOUNTER
Provider:  Darnell  Caller: Patient  Time of call:   11:44  Phone #:  [phone]  Surgery:  Carotid endartectomy  Surgery Date:  12-30-20  Last visit:   04/22/21  Next visit:     BECCA:         Reason for call:     Does patient need to d/c Plavix prior to EDMUND injections?    Is so, can she restart Plavix the day after injection?

## 2021-05-18 NOTE — TELEPHONE ENCOUNTER
Provider:  Darnell  Caller: pharmacy  Surgery:  Carotid Endarterectomy  Surgery Date:  12-  Last visit:  4-22-21   Next visit: chaim HUDSON:         Reason for call:  Pt is needing her  Plavix refilled, but I do not see it in her med list.

## 2021-06-02 NOTE — PROGRESS NOTES
Occupational Therapy D/C Note    Patient: Raquel Espitia   : 1965  Diagnosis/ICD-10 Code:  Impaired mobility and ADLs [Z74.09, Z78.9]  Date of Initial Visit: Type: THERAPY  Noted: 12/15/2020  Today's Date: 2021          Assessment & Plan     Assessment  Impairments: abnormal coordination, abnormal gait, abnormal muscle firing, abnormal muscle tone, abnormal or restricted ROM, activity intolerance, impaired balance, impaired physical strength, lacks appropriate home exercise program, pain with function and safety issue  Assessment details: D/C d/t complications with LBP from falls; followed by neurosurgery for tx. Pt having trouble getting into clinic at times d/t new onset LE pain/weakness    Prognosis: fair  Functional Limitations: carrying objects, lifting, sleeping, walking, pulling, pushing, uncomfortable because of pain, moving in bed, sitting, standing, stooping, reaching behind back, reaching overhead and unable to perform repetitive tasks  Goals  Plan Goals:   Pt will score 55 seconds or less on the 9HPT to demonstrate improved accuracy and speed with fine motor coordination by 8 wks.  MET    Pt will increase R  strength to 30 # by 8 wks to demonstrate improved strength and function for daily tasks. MET    Pt will be independent with hand strengthening HEP to increase independence with ADL/IADL performance tasks by 4 wks.  MET    Pt will be independent with hand FMC HEP to increase independence with ADL/IADL performance tasks by 4 wks. PROGRESSING DIFFICULTY; MOSTLY MET    Pt will be independent with R UE strengthening HEP to increase performance in ADL/IADL tasks by 4 wks. PARTIALLY MET        Plan  Planned therapy interventions: ADL retraining, balance/weight-bearing training, fine motor coordination training, flexibility, functional ROM exercises, home exercise program, motor coordination training, neuromuscular re-education, postural training, strengthening, stretching, therapeutic  activities, transfer training and IADL retraining  Treatment plan discussed with: patient  Plan details: D/C until able to safely return to OP OT        Visit Diagnoses:    ICD-10-CM ICD-9-CM   1. Impaired mobility and ADLs  Z74.09 V49.89    Z78.9    2. Decreased coordination  R27.8 781.3   3. Decreased  strength of right hand  R29.898 729.89             Timed:  Manual Therapy:    0     mins  19928;  Therapeutic Exercise:    0     mins  66429;     Neuromuscular Danish:    0    mins  08285;    Therapeutic Activity:     0     mins  66782;     Self-Care/ADL     0     mins  25650;   Sensory Int. Tech      0     mins 09469;  Ultrasound:     0     mins  00162;    Electrical Stimulation:    0     mins  88703 ( );    Untimed:  Electrical Stimulation:    0     mins  75670 ( );    Timed Treatment:   0   mins   Total Treatment:     0   mins    OT Signature: Etelvina Hennessy MS, OTR/L, CDP  [place] License #: [id number]

## 2021-06-28 NOTE — PROGRESS NOTES
Subjective   Raquel Espitia is a 56 y.o. female    Chief Complaint    Type 2 diabetes mellitus  Hypertension  Chronic back pain  Anxiety disorder  Tobacco abuse    History of Present Illness  Patient presents today for a recheck associated with her type 2 diabetes mellitus. She is also due for multiple prescription refills which are used for her other chronic medical problems. She would like to reinitiate treatment for tobacco abuse with Chantix and nicotine replacement products. She has tried these in the past and had mixed success but has been unable to stay off of cigarettes. She reports that her blood sugar has been elevated in the 200-300 range lately when checked at home. She has had extensive dental work over the past year which may have exacerbated her diabetes somewhat due to pain and antibiotic usage. Her blood pressure has remained stable and today is good at 110/68    The following portions of the patient's history were reviewed and updated as appropriate: allergies, current medications, past social history and problem list    Review of Systems   Constitutional: Negative.  Negative for appetite change, diaphoresis, fatigue and unexpected weight change.   Eyes: Negative for visual disturbance.   Respiratory: Positive for cough, shortness of breath and wheezing. Negative for chest tightness.    Cardiovascular: Negative for chest pain, palpitations and leg swelling.   Gastrointestinal: Negative.  Negative for diarrhea, nausea and vomiting.   Endocrine: Negative for polydipsia, polyphagia and polyuria.   Musculoskeletal: Positive for back pain. Negative for arthralgias, gait problem and myalgias.   Skin: Negative for color change and rash.   Neurological: Negative for dizziness, tremors, syncope, weakness, light-headedness, numbness and headaches.   Hematological: Negative for adenopathy. Does not bruise/bleed easily.   Psychiatric/Behavioral: Negative for behavioral problems and dysphoric mood. The  patient is not nervous/anxious.        Objective     Vitals:    06/15/21 1101   BP: 110/68   Pulse: 96   Resp: 15   Temp: 96.9 °F (36.1 °C)   SpO2: 98%       Physical Exam  Vitals and nursing note reviewed.   Constitutional:       Appearance: She is obese.   HENT:      Head: Normocephalic and atraumatic.      Mouth/Throat:      Pharynx: Posterior oropharyngeal erythema present. No oropharyngeal exudate.   Eyes:      Conjunctiva/sclera: Conjunctivae normal.      Pupils: Pupils are equal, round, and reactive to light.   Neck:      Vascular: No carotid bruit.   Cardiovascular:      Rate and Rhythm: Normal rate and regular rhythm.   Pulmonary:      Effort: Pulmonary effort is normal.      Breath sounds: Decreased breath sounds present.   Abdominal:      Palpations: Abdomen is soft.      Tenderness: There is no abdominal tenderness.   Musculoskeletal:         General: Deformity present.      Cervical back: Neck supple.      Right lower leg: Edema present.      Left lower leg: Edema present.   Skin:     General: Skin is warm and dry.   Neurological:      Mental Status: She is alert.         Assessment/Plan   Problems Addressed this Visit        Cardiac and Vasculature    HTN (Chronic)       Musculoskeletal and Injuries    Chronic bilateral low back pain without sciatica    Relevant Medications    baclofen (LIORESAL) 20 MG tablet    albuterol sulfate  (90 Base) MCG/ACT inhaler       Tobacco    Chronic tobacco abuse (1.5 PPD x 40 yrs) (Chronic)    Relevant Medications    Empagliflozin 25 MG tablet    nicotine (Nicotine Step 1) 21 MG/24HR patch      Other Visit Diagnoses     Type 2 diabetes mellitus without complication, without long-term current use of insulin (CMS/[facility])    -  Primary    Relevant Medications    atorvastatin (LIPITOR) 80 MG tablet    Empagliflozin 25 MG tablet    metFORMIN (GLUCOPHAGE) 1000 MG tablet    omeprazole (priLOSEC) 40 MG capsule    SITagliptin (JANUVIA) 25 MG tablet    Other Relevant Orders     POC Glycosylated Hemoglobin (Hb A1C) (Completed)    Anxiety        Relevant Medications    diazePAM (VALIUM) 10 MG tablet    History of right-sided carotid endarterectomy          Diagnoses       Codes Comments    Type 2 diabetes mellitus without complication, without long-term current use of insulin (CMS/[facility])    -  Primary ICD-10-CM: E11.9  ICD-9-CM: 250.00     Chronic bilateral low back pain without sciatica     ICD-10-CM: M54.5, G89.29  ICD-9-CM: 724.2, 338.29     Anxiety     ICD-10-CM: F41.9  ICD-9-CM: 300.00     Benign essential hypertension     ICD-10-CM: I10  ICD-9-CM: 401.1     History of right-sided carotid endarterectomy     ICD-10-CM: Z98.890  ICD-9-CM: V45.89     Tobacco abuse     ICD-10-CM: Z72.0  ICD-9-CM: 305.1

## 2021-09-24 NOTE — TELEPHONE ENCOUNTER
Caller: Raquel Espitia    Relationship to patient: Self    Best call back number: [phone]    Patient is needing:     PRESCRIPTION WAS CALLED INTO PHARMACY FOR  diazePAM (VALIUM) 10 MG tablet  THIS SHOULD HAVE BEEN  FOR 75 TABLETS AND IT WAS ONLY FOR 45.  PATIENT NEEDS THIS CORRECTED BEFORE SHE LEAVES [facility] 09/25.  WILL BE GONE FOR 2 WEEKS      [facility][id number] [address] AT [facility] [address] - [phone] [facility] [phone] FX

## 2021-11-20 NOTE — ED PROVIDER NOTES
Subjective   Patient is a 56-year-old female who presents to the emergency room today for evaluation following a fall.  Patient states that the last few days of that she has not felt good.  She shares because of this she has not taken any of her home meds.  She states that she takes Lortab tens 4 times a day for her normal everyday pain medication management.  She states that she did want to mask any of her symptoms and thus not taking her daily medications.  She shares that she was in the kitchen today and preparing a meal when she went to get up to go to the stove and experienced a fall.  She fell on her right hip.  She reports that since this time she has had pain with bearing weight and ambulation in her right hip.  She also reports after the incident that she had chest pain.  She states that she is previously been having chest pain for the last 2 days but that experienced again today after the fall.  She reports that her symptoms have improved on interview and exam.  She denies any loss of consciousness.  She is on Plavix but has not taken it for a few days.  She reports no additional associated symptoms on exam.  Patient does have past medical history significant of CVA with right-sided weakness as a deficit.          Review of Systems   Constitutional: Positive for activity change and fatigue. Negative for chills and fever.   HENT: Negative.    Eyes: Negative.    Respiratory: Negative.    Cardiovascular: Positive for chest pain.   Gastrointestinal: Negative.    Genitourinary: Negative.    Musculoskeletal: Positive for arthralgias, joint swelling and myalgias.   Skin: Negative.    Neurological: Positive for dizziness.       Past Medical History:   Diagnosis Date   • Acute bronchitis    • Arthritis    • Asthma    • COPD (chronic obstructive pulmonary disease) ([facility])    • Diabetes mellitus ([facility])     TWICE PER WEEK CHECKS SUGAR    • Edema    • Fibromyalgia    • GERD (gastroesophageal reflux disease)    • Headache     • Hyperlipidemia    • Hypertension    • Impaired fasting glucose    • Low back pain    • Migraine    • Pain of left calf    • Shingles outbreak     8-9  YEARS AGO- THINK HAD IT    • Stroke (HCC)     X2 TIA -  BOTH SIDES EFFECTED    • Wears dentures     FULL BUT DOESNT WEAR ALWAYS    • Wears glasses        No Known Allergies    Past Surgical History:   Procedure Laterality Date   • ABDOMINAL SURGERY      X2 ULCER REPAIR    • BACK SURGERY      X2 fusion lumbar   • CAROTID ENDARTERECTOMY Right 2020    Procedure: CAROTID ENDARTERECTOMY WITH EEG RIGHT;  Surgeon: Gregg Patrick MD;  Location: [facility] OR;  Service: Neurosurgery;  Laterality: Right;   •  SECTION     • CHOLECYSTECTOMY     • COLONOSCOPY     • ENDOSCOPY     • HAND SURGERY      RIGHT nerve repair   • INTERVENTIONAL RADIOLOGY PROCEDURE Bilateral 2020    Procedure: CAROTID CEREBRAL ANGIOGRAM BILATERAL;  Surgeon: Gregg Patrick MD;  Location: [facility] CATH INVASIVE LOCATION;  Service: Interventional Radiology;  Laterality: Bilateral;   • NECK SURGERY      cervicle spine plate        Family History   Problem Relation Age of Onset   • Hypertension Mother    • Stroke Mother    • Cancer Father    • Stroke Father        Social History     Socioeconomic History   • Marital status: Legally    Tobacco Use   • Smoking status: Light Tobacco Smoker     Packs/day: 0.00     Years: 40.00     Pack years: 0.00     Types: Cigarettes   • Smokeless tobacco: Former User     Types: Chew   • Tobacco comment: Patient states that she is down to 1 pack a month.    Vaping Use   • Vaping Use: Never used   Substance and Sexual Activity   • Alcohol use: No   • Drug use: No   • Sexual activity: Defer           Objective   Physical Exam  Vitals and nursing note reviewed.   Constitutional:       General: She is not in acute distress.     Appearance: Normal appearance. She is not ill-appearing or toxic-appearing.   HENT:      Head: Normocephalic and  atraumatic.      Nose: Nose normal.      Mouth/Throat:      Mouth: Mucous membranes are moist.   Eyes:      Extraocular Movements: Extraocular movements intact.      Conjunctiva/sclera: Conjunctivae normal.   Cardiovascular:      Rate and Rhythm: Normal rate and regular rhythm.      Pulses: Normal pulses.   Pulmonary:      Effort: Pulmonary effort is normal. No respiratory distress.      Breath sounds: Normal breath sounds.   Abdominal:      Palpations: Abdomen is soft.      Tenderness: There is no abdominal tenderness.   Musculoskeletal:      Cervical back: Normal range of motion and neck supple.      Right hip: Tenderness present. No deformity or bony tenderness. Normal range of motion. Normal strength.   Skin:     General: Skin is warm and dry.   Neurological:      General: No focal deficit present.      Mental Status: She is alert.   Psychiatric:         Mood and Affect: Mood normal.         Behavior: Behavior normal.         Thought Content: Thought content normal.         Judgment: Judgment normal.         Procedures           ED Course  ED Course as of 11/20/21 0228   Sat Nov 20, 2021   0226 Patient presents to the emergency room for evaluation following a fall.  Patient with complaints of right hip pain.  No acute or emergent findings demonstrated on physical exam.  Patient neurologically and neurovascularly intact.  No focal weakness appreciated in lower extremities.  Labs without acute or emergent abnormalities.  Patient also with complaints of chest pain.  Initial and repeat troponin are negative.  Nasopharyngeal swab for COVID-19 and influenza also negative.  EKG demonstrates normal sinus rhythm without acute ST elevations.  Chest x-ray shows no acute cardiopulmonary process.  X-ray of the hip demonstrates no acute fractures or dislocations.  Patient responded well to medication provided in the ED with improvement of her symptoms.  Patient is afebrile, nontoxic appearing, vital signs stable and able to  maintain O2 sats of 98% on room air. Patient will be discharged home with outpatient follow up to their primary care provider as recommended. Patient is agreeable to plan of care of outpatient follow up, provided clear return precautions and demonstrated understanding. [JG]      ED Course User Index  [JG] Mehul Obregon, PA      Recent Results (from the past 24 hour(s))   Comprehensive Metabolic Panel    Collection Time: 11/19/21  7:37 PM    Specimen: Blood   Result Value Ref Range    Glucose 216 (H) 65 - 99 mg/dL    BUN 23 (H) 6 - 20 mg/dL    Creatinine 0.87 0.57 - 1.00 mg/dL    Sodium 138 136 - 145 mmol/L    Potassium 4.0 3.5 - 5.2 mmol/L    Chloride 101 98 - 107 mmol/L    CO2 26.0 22.0 - 29.0 mmol/L    Calcium 9.8 8.6 - 10.5 mg/dL    Total Protein 7.5 6.0 - 8.5 g/dL    Albumin 4.50 3.50 - 5.20 g/dL    ALT (SGPT) 16 1 - 33 U/L    AST (SGOT) 18 1 - 32 U/L    Alkaline Phosphatase 131 (H) 39 - 117 U/L    Total Bilirubin 0.4 0.0 - 1.2 mg/dL    eGFR Non African Amer 67 >60 mL/min/1.73    Globulin 3.0 gm/dL    A/G Ratio 1.5 g/dL    BUN/Creatinine Ratio 26.4 (H) 7.0 - 25.0    Anion Gap 11.0 5.0 - 15.0 mmol/L   Troponin    Collection Time: 11/19/21  7:37 PM    Specimen: Blood   Result Value Ref Range    Troponin T <0.010 0.000 - 0.030 ng/mL   Magnesium    Collection Time: 11/19/21  7:37 PM    Specimen: Blood   Result Value Ref Range    Magnesium 1.9 1.6 - 2.6 mg/dL   Green Top (Gel)    Collection Time: 11/19/21  7:37 PM   Result Value Ref Range    Extra Tube Hold for add-ons.    Lavender Top    Collection Time: 11/19/21  7:37 PM   Result Value Ref Range    Extra Tube hold for add-on    Gold Top - SST    Collection Time: 11/19/21  7:37 PM   Result Value Ref Range    Extra Tube Hold for add-ons.    Gray Top    Collection Time: 11/19/21  7:37 PM   Result Value Ref Range    Extra Tube Hold for add-ons.    Light Blue Top    Collection Time: 11/19/21  7:37 PM   Result Value Ref Range    Extra Tube hold for add-on    CBC Auto  Differential    Collection Time: 11/19/21  7:37 PM    Specimen: Blood   Result Value Ref Range    WBC 11.47 (H) 3.40 - 10.80 10*3/mm3    RBC 6.00 (H) 3.77 - 5.28 10*6/mm3    Hemoglobin 17.6 (H) 12.0 - 15.9 g/dL    Hematocrit 51.8 (H) 34.0 - 46.6 %    MCV 86.3 79.0 - 97.0 fL    MCH 29.3 26.6 - 33.0 pg    MCHC 34.0 31.5 - 35.7 g/dL    RDW 13.5 12.3 - 15.4 %    RDW-SD 42.5 37.0 - 54.0 fl    MPV 11.1 6.0 - 12.0 fL    Platelets 233 140 - 450 10*3/mm3    Neutrophil % 64.5 42.7 - 76.0 %    Lymphocyte % 27.4 19.6 - 45.3 %    Monocyte % 5.6 5.0 - 12.0 %    Eosinophil % 1.7 0.3 - 6.2 %    Basophil % 0.4 0.0 - 1.5 %    Immature Grans % 0.4 0.0 - 0.5 %    Neutrophils, Absolute 7.39 (H) 1.70 - 7.00 10*3/mm3    Lymphocytes, Absolute 3.14 (H) 0.70 - 3.10 10*3/mm3    Monocytes, Absolute 0.64 0.10 - 0.90 10*3/mm3    Eosinophils, Absolute 0.20 0.00 - 0.40 10*3/mm3    Basophils, Absolute 0.05 0.00 - 0.20 10*3/mm3    Immature Grans, Absolute 0.05 0.00 - 0.05 10*3/mm3    nRBC 0.0 0.0 - 0.2 /100 WBC   Urinalysis With Microscopic If Indicated (No Culture) - Urine, Clean Catch    Collection Time: 11/19/21  8:07 PM    Specimen: Urine, Clean Catch   Result Value Ref Range    Color, UA Yellow Yellow, Straw    Appearance, UA Clear Clear    pH, UA 6.5 5.0 - 8.0    Specific Gravity, UA 1.011 1.001 - 1.030    Glucose,  mg/dL (Trace) (A) Negative    Ketones, UA Negative Negative    Bilirubin, UA Negative Negative    Blood, UA Negative Negative    Protein, UA Negative Negative    Leuk Esterase, UA Trace (A) Negative    Nitrite, UA Negative Negative    Urobilinogen, UA 0.2 E.U./dL 0.2 - 1.0 E.U./dL   Urinalysis, Microscopic Only - Urine, Clean Catch    Collection Time: 11/19/21  8:07 PM    Specimen: Urine, Clean Catch   Result Value Ref Range    RBC, UA 0-2 None Seen, 0-2 /HPF    WBC, UA 3-5 (A) None Seen, 0-2 /HPF    Bacteria, UA None Seen None Seen, Trace /HPF    Squamous Epithelial Cells, UA 0-2 None Seen, 0-2 /HPF    Hyaline Casts, UA None  Seen 0 - 6 /LPF    Methodology Automated Microscopy    COVID-19 and FLU A/B PCR - Swab, Nasopharynx    Collection Time: 11/19/21  9:25 PM    Specimen: Nasopharynx; Swab   Result Value Ref Range    COVID19 Not Detected Not Detected - Ref. Range    Influenza A PCR Not Detected Not Detected    Influenza B PCR Not Detected Not Detected   Troponin    Collection Time: 11/19/21  9:44 PM    Specimen: Blood   Result Value Ref Range    Troponin T <0.010 0.000 - 0.030 ng/mL     Note: In addition to lab results from this visit, the labs listed above may include labs taken at another facility or during a different encounter within the last 24 hours. Please correlate lab times with ED admission and discharge times for further clarification of the services performed during this visit.    XR Hip With or Without Pelvis 2 - 3 View Right   Final Result   No acute fracture or subluxation identified.      Signer Name: Rosi Tapia MD    Signed: 11/19/2021 9:39 PM    Workstation Name: [email or web address] [id number]     Radiology [facility]      XR Chest 1 View   Final Result   No acute cardiopulmonary findings.      Signer Name: Rosi Tapia MD    Signed: 11/19/2021 9:21 PM    Workstation Name: [email or web address] [id number]     [facility]        Vitals:    11/19/21 2100 11/19/21 2130 11/19/21 2200 11/19/21 2230   BP: 119/59 121/72 117/70 128/64   BP Location: Right arm      Patient Position: Lying      Pulse: 110      Resp: 18      Temp:       TempSrc:       SpO2: 98%      Weight:       Height:         Medications   HYDROcodone-acetaminophen (NORCO)  MG per tablet 1 tablet (1 tablet Oral Given 11/19/21 2123)   sodium chloride 0.9 % bolus 500 mL (0 mL Intravenous Stopped 11/19/21 2311)     ECG/EMG Results (last 24 hours)     Procedure Component Value Units Date/Time    ECG 12 Lead [[id number]] Collected: 11/19/21 1937     Updated: 11/19/21 1938    ECG 12 Lead [[id number]] Collected: 11/19/21 2149     Updated: 11/19/21 2149        ECG 12  Lead         ECG 12 Lead                                                MDM  Number of Diagnoses or Management Options  Contusion of right hip, initial encounter: new and requires workup  Fall, initial encounter: new and requires workup  Nonspecific chest pain: new and requires workup  Right hip pain: new and requires workup     Amount and/or Complexity of Data Reviewed  Clinical lab tests: reviewed  Tests in the radiology section of CPT®: reviewed  Tests in the medicine section of CPT®: reviewed  Decide to obtain previous medical records or to obtain history from someone other than the patient: yes    Risk of Complications, Morbidity, and/or Mortality  Presenting problems: moderate  Diagnostic procedures: moderate  Management options: moderate    Patient Progress  Patient progress: improved      Final diagnoses:   Fall, initial encounter   Right hip pain   Contusion of right hip, initial encounter   Nonspecific chest pain       ED Disposition  ED Disposition     ED Disposition Condition Comment    Discharge Stable           [facility] CARDIOLOGY  [address]  [place] [id number] [phone]  Schedule an appointment as soon as possible for a visit   You have been referred to the chest pain clinic for follow-up care.    Vikram Stewart MD  [address]  [place] [address]  [phone]    Schedule an appointment as soon as possible for a visit       [facility] Emergency Department  [address] [address] [phone]  Go to   If symptoms worsen         Medication List      Changed    * baclofen 10 MG tablet  Commonly known as: LIORESAL  Take 1 tablet by mouth 2 (Two) Times a Day As Needed for Muscle Spasms.  What changed: when to take this     * baclofen 20 MG tablet  Commonly known as: LIORESAL  Take 1 tablet by mouth 3 (Three) Times a Day.  What changed: Another medication with the same name  was changed. Make sure you understand how and when to take each.         * This list has 2 medication(s) that are the same as other medications prescribed for you. Read the directions carefully, and ask your doctor or other care provider to review them with you.                 Mehul Obregon, PA  11/20/21 0226

## 2021-11-23 NOTE — PROGRESS NOTES
[facility] Heart Monitor Documentation    Raquel Espitia  1965  [id number]  11/23/21    LOURDES Rodriguez    [] ZIO XT Patch  Model [id number] Prescribed for  Days    · Serial Number: (N + 9 Digits) N   · Apply-By Date on Box:   · USPS Tracking Number:   · USPS Tracking        [] [employer] MINI PLUS Mobile Cardiac Telemetry  Model BGMINIPLUS Prescribed for 30 Days    · Serial Number: (BGM + 7 Digits) [id number]  · Shipped-By Date on Box: 11/10/2021  · UPS Tracking Number: [id number]  · UPS Tracking      [] [employer] MINI Holter Monitor  Model BGMINIEL Prescribed for  Days    · Serial Number: (7 Digits)   · Shipped-By Date on Box:   · UPS Tracking Number: [id number]  · UPS Tracking        This monitor was applied to the patient's chest and checked for proper functioning.  Ms. Raquel Espitia was instructed in the proper use of this monitor.  She was given the opportunity to ask questions and left the office with the device 's instruction manual.    Анна Bedoya CMA, 09:29 EST, 11/23/21                  [facility]

## 2021-11-23 NOTE — PROGRESS NOTES
[facility], [facility] Heart and Vascular    Chief Complaint  Chest Pain    Subjective    History of Present Illness {CC  Problem List  Visit  Diagnosis   Encounters  Notes  Medications  Labs  Result Review Imaging  Media :23}     Raquel Espitia presents to [facility] CARDIOLOGY for   History of Present Illness     56-year-old female with history of diabetes type 2, hypertension, tobacco abuse, GERD, hyperlipidemia, hypertension, chronic pain, history of CVA (2020), status post LICA stent 11/20/2020.  She was seen by [facility] in 2019 for chest pain.    Patient presented to [facility] ED on 11/19/2021 after a fall.  She had stopped all of her daily medications except for pain medications.  Patient also complaining of chest pain after her fall.  Patient denies feelings of syncope.  Reports she was eating, stool up and got lightheaded and fell to the ground.     Reports intermittent chest tightness across chest.  Duration for >3 weeks.  Different then s/s prior to CVA.  Reports intermittent palpitations described as fluttering. Dizziness worse in the last 3 weeks.       Father had MI in his 60s, mother had CVA in her 60s.  Both diabetics.    Echocardiogram 11/24/2020, EF 56 to 60%, grade 1 diastolic dysfunction, left ventricular hypertrophy.  Sigmoid shaped ventricular septum.  Saline test positive Valsalva maneuver.  No significant structural or functional disease.    Stress test 2019: No ischemia, EF greater than 70%.    Carotid duplex 4/23/2021: R ICA 50 to 69%, left carotid stent with no stenosis.      Objective     Vital Signs:   Vitals:    11/23/21 0853 11/23/21 0855 11/23/21 0856   BP: 142/64 103/57 126/61   BP Location: Right arm Left arm Left arm   Patient Position: Sitting Standing Sitting   Cuff Size: Adult Adult Adult   Pulse: 95 92 90   Resp:   16   Temp:  96.6 °F (35.9 °C) 96.6 °F (35.9 °C)   TempSrc:  Temporal Temporal   SpO2: 98% 97% 97%   Weight:  "  73.1 kg (161 lb 2 oz)   Height:   157.5 cm (62\")     Body mass index is 29.47 kg/m².  Physical Exam  Vitals reviewed.   Constitutional:       General: She is not in acute distress.     Appearance: Normal appearance.   Cardiovascular:      Rate and Rhythm: Normal rate and regular rhythm.      Pulses:           Radial pulses are 2+ on the right side.        Dorsalis pedis pulses are 2+ on the right side.        Posterior tibial pulses are 2+ on the right side.      Heart sounds: Normal heart sounds.   Pulmonary:      Effort: Pulmonary effort is normal.      Breath sounds: Normal breath sounds.   Abdominal:      Palpations: Abdomen is soft.      Tenderness: There is no abdominal tenderness.   Musculoskeletal:      Right lower leg: No edema.      Left lower leg: No edema.   Skin:     General: Skin is warm and dry.   Neurological:      Mental Status: She is alert.   Psychiatric:         Mood and Affect: Mood normal.         Behavior: Behavior is cooperative.              Result Review  Data Reviewed:{ Labs  Result Review  Imaging  Med Tab  Media :23}     Admission on 11/19/2021, Discharged on 11/19/2021   Component Date Value Ref Range Status   • QT Interval 11/19/2021 336  ms Final   • QTC Interval 11/19/2021 450  ms Final   • Glucose 11/19/2021 216* 65 - 99 mg/dL Final   • BUN 11/19/2021 23* 6 - 20 mg/dL Final   • Creatinine 11/19/2021 0.87  0.57 - 1.00 mg/dL Final   • Sodium 11/19/2021 138  136 - 145 mmol/L Final   • Potassium 11/19/2021 4.0  3.5 - 5.2 mmol/L Final    Slight hemolysis detected by analyzer. Results may be affected.   • Chloride 11/19/2021 101  98 - 107 mmol/L Final   • CO2 11/19/2021 26.0  22.0 - 29.0 mmol/L Final   • Calcium 11/19/2021 9.8  8.6 - 10.5 mg/dL Final   • Total Protein 11/19/2021 7.5  6.0 - 8.5 g/dL Final   • Albumin 11/19/2021 4.50  3.50 - 5.20 g/dL Final   • ALT (SGPT) 11/19/2021 16  1 - 33 U/L Final   • AST (SGOT) 11/19/2021 18  1 - 32 U/L Final   • Alkaline Phosphatase 11/19/2021 " 131* 39 - 117 U/L Final   • Total Bilirubin 11/19/2021 0.4  0.0 - 1.2 mg/dL Final   • eGFR Non  Amer 11/19/2021 67  >60 mL/min/1.73 Final   • Globulin 11/19/2021 3.0  gm/dL Final    Calculated Result   • A/G Ratio 11/19/2021 1.5  g/dL Final   • BUN/Creatinine Ratio 11/19/2021 26.4* 7.0 - 25.0 Final   • Anion Gap 11/19/2021 11.0  5.0 - 15.0 mmol/L Final   • Troponin T 11/19/2021 <0.010  0.000 - 0.030 ng/mL Final   • Magnesium 11/19/2021 1.9  1.6 - 2.6 mg/dL Final   • Color, UA 11/19/2021 Yellow  Yellow, Straw Final   • Appearance, UA 11/19/2021 Clear  Clear Final   • pH, UA 11/19/2021 6.5  5.0 - 8.0 Final   • Specific Gravity, UA 11/19/2021 1.011  1.001 - 1.030 Final   • Glucose, UA 11/19/2021 100 mg/dL (Trace)* Negative Final   • Ketones, UA 11/19/2021 Negative  Negative Final   • Bilirubin, UA 11/19/2021 Negative  Negative Final   • Blood, UA 11/19/2021 Negative  Negative Final   • Protein, UA 11/19/2021 Negative  Negative Final   • Leuk Esterase, UA 11/19/2021 Trace* Negative Final   • Nitrite, UA 11/19/2021 Negative  Negative Final   • Urobilinogen, UA 11/19/2021 0.2 E.U./dL  0.2 - 1.0 E.U./dL Final   • Extra Tube 11/19/2021 Hold for add-ons.   Final    Auto resulted.   • Extra Tube 11/19/2021 hold for add-on   Final    Auto resulted   • Extra Tube 11/19/2021 Hold for add-ons.   Final    Auto resulted.   • Extra Tube 11/19/2021 Hold for add-ons.   Final    Auto resulted.   • Extra Tube 11/19/2021 hold for add-on   Final    Auto resulted   • WBC 11/19/2021 11.47* 3.40 - 10.80 10*3/mm3 Final   • RBC 11/19/2021 6.00* 3.77 - 5.28 10*6/mm3 Final   • Hemoglobin 11/19/2021 17.6* 12.0 - 15.9 g/dL Final   • Hematocrit 11/19/2021 51.8* 34.0 - 46.6 % Final   • MCV 11/19/2021 86.3  79.0 - 97.0 fL Final   • MCH 11/19/2021 29.3  26.6 - 33.0 pg Final   • MCHC 11/19/2021 34.0  31.5 - 35.7 g/dL Final   • RDW 11/19/2021 13.5  12.3 - 15.4 % Final   • RDW-SD 11/19/2021 42.5  37.0 - 54.0 fl Final   • MPV 11/19/2021 11.1  6.0 -  12.0 fL Final   • Platelets 11/19/2021 233  140 - 450 10*3/mm3 Final   • Neutrophil % 11/19/2021 64.5  42.7 - 76.0 % Final   • Lymphocyte % 11/19/2021 27.4  19.6 - 45.3 % Final   • Monocyte % 11/19/2021 5.6  5.0 - 12.0 % Final   • Eosinophil % 11/19/2021 1.7  0.3 - 6.2 % Final   • Basophil % 11/19/2021 0.4  0.0 - 1.5 % Final   • Immature Grans % 11/19/2021 0.4  0.0 - 0.5 % Final   • Neutrophils, Absolute 11/19/2021 7.39* 1.70 - 7.00 10*3/mm3 Final   • Lymphocytes, Absolute 11/19/2021 3.14* 0.70 - 3.10 10*3/mm3 Final   • Monocytes, Absolute 11/19/2021 0.64  0.10 - 0.90 10*3/mm3 Final   • Eosinophils, Absolute 11/19/2021 0.20  0.00 - 0.40 10*3/mm3 Final   • Basophils, Absolute 11/19/2021 0.05  0.00 - 0.20 10*3/mm3 Final   • Immature Grans, Absolute 11/19/2021 0.05  0.00 - 0.05 10*3/mm3 Final   • nRBC 11/19/2021 0.0  0.0 - 0.2 /100 WBC Final   • RBC, UA 11/19/2021 0-2  None Seen, 0-2 /HPF Final   • WBC, UA 11/19/2021 3-5* None Seen, 0-2 /HPF Final   • Bacteria, UA 11/19/2021 None Seen  None Seen, Trace /HPF Final   • Squamous Epithelial Cells, UA 11/19/2021 0-2  None Seen, 0-2 /HPF Final   • Hyaline Casts, UA 11/19/2021 None Seen  0 - 6 /LPF Final   • Methodology 11/19/2021 Automated Microscopy   Final   • COVID19 11/19/2021 Not Detected  Not Detected - Ref. Range Final   • Influenza A PCR 11/19/2021 Not Detected  Not Detected Final   • Influenza B PCR 11/19/2021 Not Detected  Not Detected Final   • Troponin T 11/19/2021 <0.010  0.000 - 0.030 ng/mL Final   • QT Interval 11/19/2021 390  ms Final   • QTC Interval 11/19/2021 455  ms Final     Lab Results   Component Value Date    CHOL 209 (H) 11/24/2020    CHOL 220 (H) 03/13/2019     Lab Results   Component Value Date    TRIG 343 (H) 11/24/2020    TRIG 303 (H) 03/13/2019     Lab Results   Component Value Date    HDL 36 (L) 11/24/2020    HDL 40 03/13/2019     Lab Results   Component Value Date     (H) 11/24/2020     (H) 03/13/2019     EKG 11/21/2021: Normal  sinus rhythm 82 bpm    Echocardiogram 11/24/2020, EF 56 to 60%, grade 1 diastolic dysfunction, left ventricular hypertrophy.  Sigmoid shaped ventricular septum.  Saline test positive Valsalva maneuver.  No significant structural or functional disease.    Stress test 2019: No ischemia, EF greater than 70%.    Carotid duplex 4/23/2021: R ICA 50 to 69%, left carotid stent with no stenosis.  Assessment and Plan { Problem List  Visit Diagnosis  ROS  Review (Popup)  Health Maintenance  Quality  BestPractice  Medications  SmartSets  SnapShot Encounters  Media :23}   1. Chest pain, unspecified type    - Stress Test With Myocardial Perfusion (1 Day); Future    2. Bilateral carotid artery stenosis  History of carotid artery stent.  History of CVA.    Currently on Plavix, statin, and aspirin    3. Primary hypertension  Beta-blocker.  Orthostatic blood pressures noted today.  Continue to monitor    4. Hyperlipidemia, unspecified hyperlipidemia type  Statin    5. Palpitations    - Cardiac Event Monitor; Future    6. Dizziness    - Cardiac Event Monitor; Future    Patient to follow-up with Dr. Aguilar in 6 weeks or sooner if needed.  Follow-up in the heart valve center as needed or as determined by cardiology.  [facility] appointment to be scheduled (message sent to C scheduling)          Follow Up {Instructions Charge Capture  Follow-up Communications :23}   Return if symptoms worsen or fail to improve.    Patient was given instructions and counseling regarding her condition or for health maintenance advice. Please see specific information pulled into the AVS if appropriate.  Patient was instructed to call the Heart and Valve Center with any questions, concerns, or worsening symptoms.

## 2021-12-10 NOTE — PROGRESS NOTES
Your cardiac myocardial perfusion stress test results have been reviewed.  No evidence of a heart blockage.  No coronary artery calcification or buildup noted on the imaging.  This is considered a normal study. (released to my chart)

## 2022-01-01 ENCOUNTER — HOME CARE VISIT (OUTPATIENT)
Dept: HOME HEALTH SERVICES | Facility: HOME HEALTHCARE | Age: 57
End: 2022-01-01

## 2022-01-01 ENCOUNTER — ANESTHESIA (OUTPATIENT)
Dept: GASTROENTEROLOGY | Facility: HOSPITAL | Age: 57
End: 2022-01-01

## 2022-01-01 ENCOUNTER — APPOINTMENT (OUTPATIENT)
Dept: CT IMAGING | Facility: HOSPITAL | Age: 57
End: 2022-01-01

## 2022-01-01 ENCOUNTER — READMISSION MANAGEMENT (OUTPATIENT)
Dept: CALL CENTER | Facility: HOSPITAL | Age: 57
End: 2022-01-01

## 2022-01-01 ENCOUNTER — APPOINTMENT (OUTPATIENT)
Dept: GENERAL RADIOLOGY | Facility: HOSPITAL | Age: 57
End: 2022-01-01

## 2022-01-01 ENCOUNTER — TELEPHONE (OUTPATIENT)
Dept: FAMILY MEDICINE CLINIC | Facility: CLINIC | Age: 57
End: 2022-01-01

## 2022-01-01 ENCOUNTER — TRANSITIONAL CARE MANAGEMENT TELEPHONE ENCOUNTER (OUTPATIENT)
Dept: CALL CENTER | Facility: HOSPITAL | Age: 57
End: 2022-01-01

## 2022-01-01 ENCOUNTER — TELEPHONE (OUTPATIENT)
Dept: NEUROLOGY | Facility: CLINIC | Age: 57
End: 2022-01-01

## 2022-01-01 ENCOUNTER — HOME HEALTH ADMISSION (OUTPATIENT)
Dept: HOME HEALTH SERVICES | Facility: HOME HEALTHCARE | Age: 57
End: 2022-01-01

## 2022-01-01 ENCOUNTER — HOSPITAL ENCOUNTER (INPATIENT)
Facility: HOSPITAL | Age: 57
LOS: 4 days | Discharge: NURSING, REHAB OR LONG-TERM CARE | End: 2022-01-25
Attending: EMERGENCY MEDICINE | Admitting: INTERNAL MEDICINE

## 2022-01-01 ENCOUNTER — APPOINTMENT (OUTPATIENT)
Dept: NEUROLOGY | Facility: HOSPITAL | Age: 57
End: 2022-01-01

## 2022-01-01 ENCOUNTER — TELEMEDICINE (OUTPATIENT)
Dept: FAMILY MEDICINE CLINIC | Facility: CLINIC | Age: 57
End: 2022-01-01

## 2022-01-01 ENCOUNTER — APPOINTMENT (OUTPATIENT)
Dept: CARDIOLOGY | Facility: HOSPITAL | Age: 57
End: 2022-01-01

## 2022-01-01 ENCOUNTER — OFFICE VISIT (OUTPATIENT)
Dept: CARDIOLOGY | Facility: CLINIC | Age: 57
End: 2022-01-01

## 2022-01-01 ENCOUNTER — HOSPITAL ENCOUNTER (INPATIENT)
Facility: HOSPITAL | Age: 57
LOS: 8 days | Discharge: HOME HEALTH | End: 2022-02-03
Attending: EMERGENCY MEDICINE | Admitting: INTERNAL MEDICINE

## 2022-01-01 ENCOUNTER — APPOINTMENT (OUTPATIENT)
Dept: MRI IMAGING | Facility: HOSPITAL | Age: 57
End: 2022-01-01

## 2022-01-01 ENCOUNTER — HOSPITAL ENCOUNTER (OUTPATIENT)
Dept: DIABETES SERVICES | Facility: HOSPITAL | Age: 57
Setting detail: RECURRING SERIES
Discharge: HOME | End: 2022-02-08

## 2022-01-01 ENCOUNTER — ANESTHESIA EVENT (OUTPATIENT)
Dept: GASTROENTEROLOGY | Facility: HOSPITAL | Age: 57
End: 2022-01-01

## 2022-01-01 VITALS — OXYGEN SATURATION: 90 % | HEART RATE: 103 BPM | SYSTOLIC BLOOD PRESSURE: 116 MMHG | DIASTOLIC BLOOD PRESSURE: 62 MMHG

## 2022-01-01 VITALS
TEMPERATURE: 98.7 F | WEIGHT: 171.2 LBS | HEART RATE: 77 BPM | HEIGHT: 62 IN | BODY MASS INDEX: 31.5 KG/M2 | RESPIRATION RATE: 16 BRPM | DIASTOLIC BLOOD PRESSURE: 55 MMHG | SYSTOLIC BLOOD PRESSURE: 128 MMHG | OXYGEN SATURATION: 97 %

## 2022-01-01 VITALS
DIASTOLIC BLOOD PRESSURE: 74 MMHG | TEMPERATURE: 97.5 F | HEART RATE: 70 BPM | SYSTOLIC BLOOD PRESSURE: 119 MMHG | RESPIRATION RATE: 16 BRPM | OXYGEN SATURATION: 97 %

## 2022-01-01 VITALS
SYSTOLIC BLOOD PRESSURE: 128 MMHG | OXYGEN SATURATION: 95 % | TEMPERATURE: 97.3 F | RESPIRATION RATE: 16 BRPM | DIASTOLIC BLOOD PRESSURE: 74 MMHG | HEART RATE: 78 BPM

## 2022-01-01 VITALS
SYSTOLIC BLOOD PRESSURE: 122 MMHG | HEART RATE: 84 BPM | RESPIRATION RATE: 16 BRPM | OXYGEN SATURATION: 99 % | TEMPERATURE: 97.8 F | DIASTOLIC BLOOD PRESSURE: 74 MMHG

## 2022-01-01 VITALS
RESPIRATION RATE: 20 BRPM | WEIGHT: 173.06 LBS | DIASTOLIC BLOOD PRESSURE: 71 MMHG | HEIGHT: 62 IN | BODY MASS INDEX: 31.85 KG/M2 | HEART RATE: 90 BPM | OXYGEN SATURATION: 94 % | SYSTOLIC BLOOD PRESSURE: 121 MMHG | TEMPERATURE: 97.9 F

## 2022-01-01 VITALS
OXYGEN SATURATION: 98 % | HEART RATE: 70 BPM | TEMPERATURE: 98.1 F | SYSTOLIC BLOOD PRESSURE: 124 MMHG | RESPIRATION RATE: 18 BRPM | DIASTOLIC BLOOD PRESSURE: 77 MMHG

## 2022-01-01 VITALS — OXYGEN SATURATION: 99 % | DIASTOLIC BLOOD PRESSURE: 70 MMHG | SYSTOLIC BLOOD PRESSURE: 118 MMHG | HEART RATE: 93 BPM

## 2022-01-01 VITALS
OXYGEN SATURATION: 90 % | DIASTOLIC BLOOD PRESSURE: 76 MMHG | RESPIRATION RATE: 16 BRPM | HEART RATE: 97 BPM | SYSTOLIC BLOOD PRESSURE: 118 MMHG

## 2022-01-01 VITALS
RESPIRATION RATE: 16 BRPM | SYSTOLIC BLOOD PRESSURE: 122 MMHG | OXYGEN SATURATION: 95 % | TEMPERATURE: 98.6 F | DIASTOLIC BLOOD PRESSURE: 70 MMHG | HEART RATE: 106 BPM

## 2022-01-01 VITALS
DIASTOLIC BLOOD PRESSURE: 78 MMHG | OXYGEN SATURATION: 97 % | SYSTOLIC BLOOD PRESSURE: 124 MMHG | HEIGHT: 62 IN | HEART RATE: 86 BPM | BODY MASS INDEX: 29.63 KG/M2 | WEIGHT: 161 LBS

## 2022-01-01 DIAGNOSIS — G89.29 CHRONIC BILATERAL LOW BACK PAIN WITHOUT SCIATICA: ICD-10-CM

## 2022-01-01 DIAGNOSIS — F41.1 GENERALIZED ANXIETY DISORDER: Chronic | ICD-10-CM

## 2022-01-01 DIAGNOSIS — M54.50 CHRONIC BILATERAL LOW BACK PAIN WITHOUT SCIATICA: ICD-10-CM

## 2022-01-01 DIAGNOSIS — I63.9 ACUTE ISCHEMIC STROKE: Primary | ICD-10-CM

## 2022-01-01 DIAGNOSIS — I63.9 CEREBROVASCULAR ACCIDENT (CVA), UNSPECIFIED MECHANISM: Primary | ICD-10-CM

## 2022-01-01 DIAGNOSIS — E66.9 DIABETES MELLITUS TYPE 2 IN OBESE: ICD-10-CM

## 2022-01-01 DIAGNOSIS — Z87.898 HISTORY OF CHEST PAIN: Primary | ICD-10-CM

## 2022-01-01 DIAGNOSIS — I63.9 ACUTE ISCHEMIC STROKE: ICD-10-CM

## 2022-01-01 DIAGNOSIS — I63.9 ACUTE CVA (CEREBROVASCULAR ACCIDENT): Primary | ICD-10-CM

## 2022-01-01 DIAGNOSIS — Z72.0 TOBACCO ABUSE: ICD-10-CM

## 2022-01-01 DIAGNOSIS — R47.01 APHASIA: ICD-10-CM

## 2022-01-01 DIAGNOSIS — R41.841 COGNITIVE COMMUNICATION DEFICIT: ICD-10-CM

## 2022-01-01 DIAGNOSIS — K21.9 GASTROESOPHAGEAL REFLUX DISEASE, UNSPECIFIED WHETHER ESOPHAGITIS PRESENT: Chronic | ICD-10-CM

## 2022-01-01 DIAGNOSIS — I10 BENIGN ESSENTIAL HYPERTENSION: Chronic | ICD-10-CM

## 2022-01-01 DIAGNOSIS — E11.69 DIABETES MELLITUS TYPE 2 IN OBESE: ICD-10-CM

## 2022-01-01 DIAGNOSIS — Z93.1 G TUBE FEEDINGS: ICD-10-CM

## 2022-01-01 DIAGNOSIS — R53.1 WEAKNESS GENERALIZED: ICD-10-CM

## 2022-01-01 DIAGNOSIS — R41.0 CONFUSION: ICD-10-CM

## 2022-01-01 DIAGNOSIS — R13.10 DYSPHAGIA, UNSPECIFIED TYPE: ICD-10-CM

## 2022-01-01 DIAGNOSIS — E11.9 TYPE 2 DIABETES MELLITUS WITHOUT COMPLICATION, WITHOUT LONG-TERM CURRENT USE OF INSULIN: ICD-10-CM

## 2022-01-01 DIAGNOSIS — F41.9 ANXIETY: ICD-10-CM

## 2022-01-01 DIAGNOSIS — F11.90 CHRONIC, CONTINUOUS USE OF OPIOIDS: Chronic | ICD-10-CM

## 2022-01-01 DIAGNOSIS — Z86.73 HISTORY OF CVA (CEREBROVASCULAR ACCIDENT): ICD-10-CM

## 2022-01-01 DIAGNOSIS — R00.2 PALPITATIONS: ICD-10-CM

## 2022-01-01 DIAGNOSIS — I65.23 CAROTID OCCLUSION, BILATERAL: ICD-10-CM

## 2022-01-01 DIAGNOSIS — I65.23 CAROTID STENOSIS, BILATERAL: ICD-10-CM

## 2022-01-01 DIAGNOSIS — Z72.0 TOBACCO ABUSE: Chronic | ICD-10-CM

## 2022-01-01 DIAGNOSIS — I10 BENIGN ESSENTIAL HYPERTENSION: ICD-10-CM

## 2022-01-01 DIAGNOSIS — E78.5 HYPERLIPIDEMIA LDL GOAL <70: ICD-10-CM

## 2022-01-01 LAB
ALBUMIN SERPL-MCNC: 3.8 G/DL (ref 3.5–5.2)
ALBUMIN/GLOB SERPL: 1.8 G/DL
ALP SERPL-CCNC: 95 U/L (ref 39–117)
ALT SERPL W P-5'-P-CCNC: 13 U/L (ref 1–33)
ALT SERPL W P-5'-P-CCNC: 15 U/L (ref 1–33)
ALT SERPL W P-5'-P-CCNC: 20 U/L (ref 1–33)
ANA SER QL: NEGATIVE
ANION GAP SERPL CALCULATED.3IONS-SCNC: 10 MMOL/L (ref 5–15)
ANION GAP SERPL CALCULATED.3IONS-SCNC: 11 MMOL/L (ref 5–15)
ANION GAP SERPL CALCULATED.3IONS-SCNC: 13 MMOL/L (ref 5–15)
ANION GAP SERPL CALCULATED.3IONS-SCNC: 13 MMOL/L (ref 5–15)
ANION GAP SERPL CALCULATED.3IONS-SCNC: 14 MMOL/L (ref 5–15)
ANION GAP SERPL CALCULATED.3IONS-SCNC: 9 MMOL/L (ref 5–15)
APTT PPP: 33.5 SECONDS (ref 22–39)
APTT PPP: 34 SECONDS (ref 50–95)
APTT PPP: 35.4 SECONDS (ref 22–39)
APTT SCREEN TO CONFIRM RATIO: 0.81 RATIO (ref 0–1.34)
AST SERPL-CCNC: 15 U/L (ref 1–32)
AST SERPL-CCNC: 16 U/L (ref 1–32)
AST SERPL-CCNC: 17 U/L (ref 1–32)
AT III ACT/NOR PPP CHRO: 115 % (ref 75–135)
B2 GLYCOPROT1 IGA SER-ACNC: <9 GPI IGA UNITS (ref 0–25)
B2 GLYCOPROT1 IGG SER-ACNC: <9 GPI IGG UNITS (ref 0–20)
B2 GLYCOPROT1 IGM SER-ACNC: <9 GPI IGM UNITS (ref 0–32)
BACTERIA SPEC AEROBE CULT: ABNORMAL
BACTERIA SPEC AEROBE CULT: ABNORMAL
BACTERIA SPEC AEROBE CULT: NO GROWTH
BACTERIA SPEC AEROBE CULT: NORMAL
BACTERIA SPEC AEROBE CULT: NORMAL
BACTERIA UR QL AUTO: ABNORMAL /HPF
BASE EXCESS BLDA CALC-SCNC: 1 MMOL/L (ref -5–5)
BASE EXCESS BLDA CALC-SCNC: 3 MMOL/L (ref -5–5)
BASOPHILS # BLD AUTO: 0.03 10*3/MM3 (ref 0–0.2)
BASOPHILS # BLD AUTO: 0.04 10*3/MM3 (ref 0–0.2)
BASOPHILS # BLD AUTO: 0.04 10*3/MM3 (ref 0–0.2)
BASOPHILS # BLD AUTO: 0.05 10*3/MM3 (ref 0–0.2)
BASOPHILS NFR BLD AUTO: 0.3 % (ref 0–1.5)
BASOPHILS NFR BLD AUTO: 0.4 % (ref 0–1.5)
BASOPHILS NFR BLD AUTO: 0.4 % (ref 0–1.5)
BASOPHILS NFR BLD AUTO: 0.5 % (ref 0–1.5)
BH CV ECHO MEAS - AO MAX PG (FULL): 5.2 MMHG
BH CV ECHO MEAS - AO MAX PG: 8 MMHG
BH CV ECHO MEAS - AO MEAN PG (FULL): 4 MMHG
BH CV ECHO MEAS - AO MEAN PG: 5 MMHG
BH CV ECHO MEAS - AO ROOT AREA (BSA CORRECTED): 1.6
BH CV ECHO MEAS - AO ROOT AREA: 5.7 CM^2
BH CV ECHO MEAS - AO ROOT DIAM: 2.7 CM
BH CV ECHO MEAS - AO V2 MAX: 145 CM/SEC
BH CV ECHO MEAS - AO V2 MEAN: 99 CM/SEC
BH CV ECHO MEAS - AO V2 VTI: 29.8 CM
BH CV ECHO MEAS - ASC AORTA: 3.2 CM
BH CV ECHO MEAS - AVA(I,A): 2.1 CM^2
BH CV ECHO MEAS - AVA(I,D): 2.1 CM^2
BH CV ECHO MEAS - AVA(V,A): 1.6 CM^2
BH CV ECHO MEAS - AVA(V,D): 1.6 CM^2
BH CV ECHO MEAS - BSA(HAYCOCK): 1.8 M^2
BH CV ECHO MEAS - BSA: 1.7 M^2
BH CV ECHO MEAS - BZI_BMI: 29.1 KILOGRAMS/M^2
BH CV ECHO MEAS - BZI_METRIC_HEIGHT: 157.5 CM
BH CV ECHO MEAS - BZI_METRIC_WEIGHT: 72.1 KG
BH CV ECHO MEAS - EDV(CUBED): 74.1 ML
BH CV ECHO MEAS - EDV(MOD-SP2): 42 ML
BH CV ECHO MEAS - EDV(MOD-SP4): 43.7 ML
BH CV ECHO MEAS - EDV(TEICH): 78.6 ML
BH CV ECHO MEAS - EF(CUBED): 85.6 %
BH CV ECHO MEAS - EF(MOD-BP): 64 %
BH CV ECHO MEAS - EF(MOD-SP2): 68.1 %
BH CV ECHO MEAS - EF(MOD-SP4): 61.6 %
BH CV ECHO MEAS - EF(TEICH): 79.4 %
BH CV ECHO MEAS - ESV(CUBED): 10.6 ML
BH CV ECHO MEAS - ESV(MOD-SP2): 13.4 ML
BH CV ECHO MEAS - ESV(MOD-SP4): 16.8 ML
BH CV ECHO MEAS - ESV(TEICH): 16.2 ML
BH CV ECHO MEAS - FS: 47.6 %
BH CV ECHO MEAS - IVS/LVPW: 1
BH CV ECHO MEAS - IVSD: 0.8 CM
BH CV ECHO MEAS - LA DIMENSION: 3.2 CM
BH CV ECHO MEAS - LA/AO: 1.2
BH CV ECHO MEAS - LAD MAJOR: 3.7 CM
BH CV ECHO MEAS - LAT PEAK E' VEL: 6.3 CM/SEC
BH CV ECHO MEAS - LATERAL E/E' RATIO: 12.7
BH CV ECHO MEAS - LV DIASTOLIC VOL/BSA (35-75): 25.2 ML/M^2
BH CV ECHO MEAS - LV IVRT: 0.08 SEC
BH CV ECHO MEAS - LV MASS(C)D: 101.3 GRAMS
BH CV ECHO MEAS - LV MASS(C)DI: 58.4 GRAMS/M^2
BH CV ECHO MEAS - LV MAX PG: 2.8 MMHG
BH CV ECHO MEAS - LV MEAN PG: 1 MMHG
BH CV ECHO MEAS - LV SYSTOLIC VOL/BSA (12-30): 9.7 ML/M^2
BH CV ECHO MEAS - LV V1 MAX: 84.2 CM/SEC
BH CV ECHO MEAS - LV V1 MEAN: 52.9 CM/SEC
BH CV ECHO MEAS - LV V1 VTI: 21.8 CM
BH CV ECHO MEAS - LVIDD: 4.2 CM
BH CV ECHO MEAS - LVIDS: 2.2 CM
BH CV ECHO MEAS - LVLD AP2: 6.6 CM
BH CV ECHO MEAS - LVLD AP4: 7.1 CM
BH CV ECHO MEAS - LVLS AP2: 4.6 CM
BH CV ECHO MEAS - LVLS AP4: 5.3 CM
BH CV ECHO MEAS - LVOT AREA (M): 2.8 CM^2
BH CV ECHO MEAS - LVOT AREA: 2.8 CM^2
BH CV ECHO MEAS - LVOT DIAM: 1.9 CM
BH CV ECHO MEAS - LVPWD: 0.8 CM
BH CV ECHO MEAS - MED PEAK E' VEL: 6.3 CM/SEC
BH CV ECHO MEAS - MEDIAL E/E' RATIO: 12.7
BH CV ECHO MEAS - MV A MAX VEL: 103 CM/SEC
BH CV ECHO MEAS - MV DEC SLOPE: 778 CM/SEC^2
BH CV ECHO MEAS - MV DEC TIME: 0.16 SEC
BH CV ECHO MEAS - MV E MAX VEL: 80.4 CM/SEC
BH CV ECHO MEAS - MV E/A: 0.78
BH CV ECHO MEAS - MV MAX PG: 4.8 MMHG
BH CV ECHO MEAS - MV MEAN PG: 3 MMHG
BH CV ECHO MEAS - MV P1/2T MAX VEL: 112 CM/SEC
BH CV ECHO MEAS - MV P1/2T: 42.2 MSEC
BH CV ECHO MEAS - MV V2 MAX: 110 CM/SEC
BH CV ECHO MEAS - MV V2 MEAN: 83.5 CM/SEC
BH CV ECHO MEAS - MV V2 VTI: 25.7 CM
BH CV ECHO MEAS - MVA P1/2T LCG: 2 CM^2
BH CV ECHO MEAS - MVA(P1/2T): 5.2 CM^2
BH CV ECHO MEAS - MVA(VTI): 2.4 CM^2
BH CV ECHO MEAS - PA ACC TIME: 0.12 SEC
BH CV ECHO MEAS - PA PR(ACCEL): 25 MMHG
BH CV ECHO MEAS - SI(AO): 98.4 ML/M^2
BH CV ECHO MEAS - SI(CUBED): 36.6 ML/M^2
BH CV ECHO MEAS - SI(LVOT): 35.6 ML/M^2
BH CV ECHO MEAS - SI(MOD-SP2): 16.5 ML/M^2
BH CV ECHO MEAS - SI(MOD-SP4): 15.5 ML/M^2
BH CV ECHO MEAS - SI(TEICH): 36 ML/M^2
BH CV ECHO MEAS - SV(AO): 170.6 ML
BH CV ECHO MEAS - SV(CUBED): 63.4 ML
BH CV ECHO MEAS - SV(LVOT): 61.8 ML
BH CV ECHO MEAS - SV(MOD-SP2): 28.6 ML
BH CV ECHO MEAS - SV(MOD-SP4): 26.9 ML
BH CV ECHO MEAS - SV(TEICH): 62.4 ML
BH CV ECHO MEAS - TAPSE (>1.6): 2.2 CM
BH CV ECHO MEASUREMENTS AVERAGE E/E' RATIO: 12.76
BH CV LOWER VASCULAR LEFT COMMON FEMORAL AUGMENT: NORMAL
BH CV LOWER VASCULAR LEFT COMMON FEMORAL COMPRESS: NORMAL
BH CV LOWER VASCULAR LEFT COMMON FEMORAL PHASIC: NORMAL
BH CV LOWER VASCULAR LEFT COMMON FEMORAL SPONT: NORMAL
BH CV LOWER VASCULAR LEFT DISTAL FEMORAL COMPRESS: NORMAL
BH CV LOWER VASCULAR LEFT GASTRONEMIUS COMPRESS: NORMAL
BH CV LOWER VASCULAR LEFT GREATER SAPH AK COMPRESS: NORMAL
BH CV LOWER VASCULAR LEFT GREATER SAPH BK COMPRESS: NORMAL
BH CV LOWER VASCULAR LEFT LESSER SAPH COMPRESS: NORMAL
BH CV LOWER VASCULAR LEFT MID FEMORAL AUGMENT: NORMAL
BH CV LOWER VASCULAR LEFT MID FEMORAL COMPRESS: NORMAL
BH CV LOWER VASCULAR LEFT MID FEMORAL PHASIC: NORMAL
BH CV LOWER VASCULAR LEFT MID FEMORAL SPONT: NORMAL
BH CV LOWER VASCULAR LEFT PERONEAL COMPRESS: NORMAL
BH CV LOWER VASCULAR LEFT POPLITEAL AUGMENT: NORMAL
BH CV LOWER VASCULAR LEFT POPLITEAL COMPRESS: NORMAL
BH CV LOWER VASCULAR LEFT POPLITEAL PHASIC: NORMAL
BH CV LOWER VASCULAR LEFT POPLITEAL SPONT: NORMAL
BH CV LOWER VASCULAR LEFT POSTERIOR TIBIAL COMPRESS: NORMAL
BH CV LOWER VASCULAR LEFT PROFUNDA FEMORAL COMPRESS: NORMAL
BH CV LOWER VASCULAR LEFT PROXIMAL FEMORAL COMPRESS: NORMAL
BH CV LOWER VASCULAR LEFT SAPHENOFEMORAL JUNCTION COMPRESS: NORMAL
BH CV LOWER VASCULAR RIGHT COMMON FEMORAL AUGMENT: NORMAL
BH CV LOWER VASCULAR RIGHT COMMON FEMORAL COMPRESS: NORMAL
BH CV LOWER VASCULAR RIGHT COMMON FEMORAL PHASIC: NORMAL
BH CV LOWER VASCULAR RIGHT COMMON FEMORAL SPONT: NORMAL
BH CV LOWER VASCULAR RIGHT DISTAL FEMORAL COMPRESS: NORMAL
BH CV LOWER VASCULAR RIGHT GASTRONEMIUS COMPRESS: NORMAL
BH CV LOWER VASCULAR RIGHT GREATER SAPH AK COMPRESS: NORMAL
BH CV LOWER VASCULAR RIGHT GREATER SAPH BK COMPRESS: NORMAL
BH CV LOWER VASCULAR RIGHT LESSER SAPH COMPRESS: NORMAL
BH CV LOWER VASCULAR RIGHT MID FEMORAL AUGMENT: NORMAL
BH CV LOWER VASCULAR RIGHT MID FEMORAL COMPRESS: NORMAL
BH CV LOWER VASCULAR RIGHT MID FEMORAL PHASIC: NORMAL
BH CV LOWER VASCULAR RIGHT MID FEMORAL SPONT: NORMAL
BH CV LOWER VASCULAR RIGHT PERONEAL COMPRESS: NORMAL
BH CV LOWER VASCULAR RIGHT POPLITEAL AUGMENT: NORMAL
BH CV LOWER VASCULAR RIGHT POPLITEAL COMPRESS: NORMAL
BH CV LOWER VASCULAR RIGHT POPLITEAL PHASIC: NORMAL
BH CV LOWER VASCULAR RIGHT POPLITEAL SPONT: NORMAL
BH CV LOWER VASCULAR RIGHT POSTERIOR TIBIAL COMPRESS: NORMAL
BH CV LOWER VASCULAR RIGHT PROFUNDA FEMORAL COMPRESS: NORMAL
BH CV LOWER VASCULAR RIGHT PROXIMAL FEMORAL COMPRESS: NORMAL
BH CV LOWER VASCULAR RIGHT SAPHENOFEMORAL JUNCTION COMPRESS: NORMAL
BH CV VAS BP RIGHT ARM: NORMAL MMHG
BH CV XLRA - RV BASE: 3.4 CM
BH CV XLRA - RV LENGTH: 5.9 CM
BH CV XLRA - RV MID: 2.2 CM
BH CV XLRA - TDI S': 12.1 CM/SEC
BILIRUB SERPL-MCNC: 0.4 MG/DL (ref 0–1.2)
BILIRUB UR QL STRIP: ABNORMAL
BILIRUB UR QL STRIP: NEGATIVE
BUN SERPL-MCNC: 14 MG/DL (ref 6–20)
BUN SERPL-MCNC: 16 MG/DL (ref 6–20)
BUN SERPL-MCNC: 19 MG/DL (ref 6–20)
BUN SERPL-MCNC: 19 MG/DL (ref 6–20)
BUN/CREAT SERPL: 19.4 (ref 7–25)
BUN/CREAT SERPL: 27.1 (ref 7–25)
BUN/CREAT SERPL: 27.1 (ref 7–25)
BUN/CREAT SERPL: 28.8 (ref 7–25)
BUN/CREAT SERPL: 32.7 (ref 7–25)
BUN/CREAT SERPL: 33.3 (ref 7–25)
CA-I BLDA-SCNC: 1.16 MMOL/L (ref 1.2–1.32)
CA-I BLDA-SCNC: 1.23 MMOL/L (ref 1.2–1.32)
CALCIUM SPEC-SCNC: 8.7 MG/DL (ref 8.6–10.5)
CALCIUM SPEC-SCNC: 8.9 MG/DL (ref 8.6–10.5)
CALCIUM SPEC-SCNC: 9 MG/DL (ref 8.6–10.5)
CALCIUM SPEC-SCNC: 9 MG/DL (ref 8.6–10.5)
CALCIUM SPEC-SCNC: 9.4 MG/DL (ref 8.6–10.5)
CALCIUM SPEC-SCNC: 9.5 MG/DL (ref 8.6–10.5)
CARDIOLIPIN IGG SER IA-ACNC: <9 GPL U/ML (ref 0–14)
CARDIOLIPIN IGM SER IA-ACNC: <9 MPL U/ML (ref 0–12)
CHLORIDE SERPL-SCNC: 100 MMOL/L (ref 98–107)
CHLORIDE SERPL-SCNC: 102 MMOL/L (ref 98–107)
CHLORIDE SERPL-SCNC: 103 MMOL/L (ref 98–107)
CHLORIDE SERPL-SCNC: 105 MMOL/L (ref 98–107)
CHLORIDE SERPL-SCNC: 105 MMOL/L (ref 98–107)
CHLORIDE SERPL-SCNC: 106 MMOL/L (ref 98–107)
CHOLEST SERPL-MCNC: 119 MG/DL (ref 0–200)
CLARITY UR: CLEAR
CLARITY UR: CLEAR
CO2 BLDA-SCNC: 27 MMOL/L (ref 24–29)
CO2 BLDA-SCNC: 28 MMOL/L (ref 24–29)
CO2 SERPL-SCNC: 22 MMOL/L (ref 22–29)
CO2 SERPL-SCNC: 23 MMOL/L (ref 22–29)
CO2 SERPL-SCNC: 24 MMOL/L (ref 22–29)
CO2 SERPL-SCNC: 24 MMOL/L (ref 22–29)
CO2 SERPL-SCNC: 25 MMOL/L (ref 22–29)
CO2 SERPL-SCNC: 32 MMOL/L (ref 22–29)
COLOR UR: ABNORMAL
COLOR UR: YELLOW
CONFIRM APTT/NORMAL: 30.3 SEC (ref 0–47.6)
CREAT BLDA-MCNC: 0.5 MG/DL (ref 0.6–1.3)
CREAT BLDA-MCNC: 1 MG/DL (ref 0.6–1.3)
CREAT SERPL-MCNC: 0.49 MG/DL (ref 0.57–1)
CREAT SERPL-MCNC: 0.57 MG/DL (ref 0.57–1)
CREAT SERPL-MCNC: 0.59 MG/DL (ref 0.57–1)
CREAT SERPL-MCNC: 0.59 MG/DL (ref 0.57–1)
CREAT SERPL-MCNC: 0.66 MG/DL (ref 0.57–1)
CREAT SERPL-MCNC: 0.72 MG/DL (ref 0.57–1)
CRP SERPL-MCNC: 0.62 MG/DL (ref 0–0.5)
D DIMER PPP FEU-MCNC: 0.43 MCGFEU/ML (ref 0–0.56)
D-LACTATE SERPL-SCNC: 1.3 MMOL/L (ref 0.5–2)
DEPRECATED RDW RBC AUTO: 39.2 FL (ref 37–54)
DEPRECATED RDW RBC AUTO: 39.9 FL (ref 37–54)
DEPRECATED RDW RBC AUTO: 41.1 FL (ref 37–54)
DEPRECATED RDW RBC AUTO: 41.6 FL (ref 37–54)
DEPRECATED RDW RBC AUTO: 41.7 FL (ref 37–54)
DEPRECATED RDW RBC AUTO: 42.1 FL (ref 37–54)
DEPRECATED RDW RBC AUTO: 43 FL (ref 37–54)
EOSINOPHIL # BLD AUTO: 0.04 10*3/MM3 (ref 0–0.4)
EOSINOPHIL # BLD AUTO: 0.06 10*3/MM3 (ref 0–0.4)
EOSINOPHIL # BLD AUTO: 0.15 10*3/MM3 (ref 0–0.4)
EOSINOPHIL # BLD AUTO: 0.15 10*3/MM3 (ref 0–0.4)
EOSINOPHIL NFR BLD AUTO: 0.4 % (ref 0.3–6.2)
EOSINOPHIL NFR BLD AUTO: 0.7 % (ref 0.3–6.2)
EOSINOPHIL NFR BLD AUTO: 1.3 % (ref 0.3–6.2)
EOSINOPHIL NFR BLD AUTO: 1.5 % (ref 0.3–6.2)
ERYTHROCYTE [DISTWIDTH] IN BLOOD BY AUTOMATED COUNT: 12.9 % (ref 12.3–15.4)
ERYTHROCYTE [DISTWIDTH] IN BLOOD BY AUTOMATED COUNT: 12.9 % (ref 12.3–15.4)
ERYTHROCYTE [DISTWIDTH] IN BLOOD BY AUTOMATED COUNT: 13.2 % (ref 12.3–15.4)
ERYTHROCYTE [DISTWIDTH] IN BLOOD BY AUTOMATED COUNT: 13.2 % (ref 12.3–15.4)
ERYTHROCYTE [DISTWIDTH] IN BLOOD BY AUTOMATED COUNT: 13.3 % (ref 12.3–15.4)
ERYTHROCYTE [SEDIMENTATION RATE] IN BLOOD: 23 MM/HR (ref 0–30)
F5 GENE MUT ANL BLD/T: NORMAL
FACT V ACT/NOR PPP: 110 % (ref 70–150)
FACTOR II, DNA ANALYSIS: NORMAL
FLUAV RNA RESP QL NAA+PROBE: NOT DETECTED
FLUBV RNA RESP QL NAA+PROBE: NOT DETECTED
GFR SERPL CREATININE-BSD FRML MDRD: 105 ML/MIN/1.73
GFR SERPL CREATININE-BSD FRML MDRD: 105 ML/MIN/1.73
GFR SERPL CREATININE-BSD FRML MDRD: 109 ML/MIN/1.73
GFR SERPL CREATININE-BSD FRML MDRD: 130 ML/MIN/1.73
GFR SERPL CREATININE-BSD FRML MDRD: 83 ML/MIN/1.73
GFR SERPL CREATININE-BSD FRML MDRD: 92 ML/MIN/1.73
GLOBULIN UR ELPH-MCNC: 2.1 GM/DL
GLUCOSE BLDC GLUCOMTR-MCNC: 108 MG/DL (ref 70–130)
GLUCOSE BLDC GLUCOMTR-MCNC: 110 MG/DL (ref 70–130)
GLUCOSE BLDC GLUCOMTR-MCNC: 113 MG/DL (ref 70–130)
GLUCOSE BLDC GLUCOMTR-MCNC: 115 MG/DL (ref 70–130)
GLUCOSE BLDC GLUCOMTR-MCNC: 120 MG/DL (ref 70–130)
GLUCOSE BLDC GLUCOMTR-MCNC: 123 MG/DL (ref 70–130)
GLUCOSE BLDC GLUCOMTR-MCNC: 127 MG/DL (ref 70–130)
GLUCOSE BLDC GLUCOMTR-MCNC: 132 MG/DL (ref 70–130)
GLUCOSE BLDC GLUCOMTR-MCNC: 133 MG/DL (ref 70–130)
GLUCOSE BLDC GLUCOMTR-MCNC: 134 MG/DL (ref 70–130)
GLUCOSE BLDC GLUCOMTR-MCNC: 136 MG/DL (ref 70–130)
GLUCOSE BLDC GLUCOMTR-MCNC: 139 MG/DL (ref 70–130)
GLUCOSE BLDC GLUCOMTR-MCNC: 149 MG/DL (ref 70–130)
GLUCOSE BLDC GLUCOMTR-MCNC: 155 MG/DL (ref 70–130)
GLUCOSE BLDC GLUCOMTR-MCNC: 155 MG/DL (ref 70–130)
GLUCOSE BLDC GLUCOMTR-MCNC: 159 MG/DL (ref 70–130)
GLUCOSE BLDC GLUCOMTR-MCNC: 166 MG/DL (ref 70–130)
GLUCOSE BLDC GLUCOMTR-MCNC: 169 MG/DL (ref 70–130)
GLUCOSE BLDC GLUCOMTR-MCNC: 170 MG/DL (ref 70–130)
GLUCOSE BLDC GLUCOMTR-MCNC: 174 MG/DL (ref 70–130)
GLUCOSE BLDC GLUCOMTR-MCNC: 177 MG/DL (ref 70–130)
GLUCOSE BLDC GLUCOMTR-MCNC: 178 MG/DL (ref 70–130)
GLUCOSE BLDC GLUCOMTR-MCNC: 180 MG/DL (ref 70–130)
GLUCOSE BLDC GLUCOMTR-MCNC: 181 MG/DL (ref 70–130)
GLUCOSE BLDC GLUCOMTR-MCNC: 182 MG/DL (ref 70–130)
GLUCOSE BLDC GLUCOMTR-MCNC: 184 MG/DL (ref 70–130)
GLUCOSE BLDC GLUCOMTR-MCNC: 185 MG/DL (ref 70–130)
GLUCOSE BLDC GLUCOMTR-MCNC: 187 MG/DL (ref 70–130)
GLUCOSE BLDC GLUCOMTR-MCNC: 189 MG/DL (ref 70–130)
GLUCOSE BLDC GLUCOMTR-MCNC: 191 MG/DL (ref 70–130)
GLUCOSE BLDC GLUCOMTR-MCNC: 195 MG/DL (ref 70–130)
GLUCOSE BLDC GLUCOMTR-MCNC: 197 MG/DL (ref 70–130)
GLUCOSE BLDC GLUCOMTR-MCNC: 199 MG/DL (ref 70–130)
GLUCOSE BLDC GLUCOMTR-MCNC: 199 MG/DL (ref 70–130)
GLUCOSE BLDC GLUCOMTR-MCNC: 202 MG/DL (ref 70–130)
GLUCOSE BLDC GLUCOMTR-MCNC: 204 MG/DL (ref 70–130)
GLUCOSE BLDC GLUCOMTR-MCNC: 205 MG/DL (ref 70–130)
GLUCOSE BLDC GLUCOMTR-MCNC: 208 MG/DL (ref 70–130)
GLUCOSE BLDC GLUCOMTR-MCNC: 213 MG/DL (ref 70–130)
GLUCOSE BLDC GLUCOMTR-MCNC: 214 MG/DL (ref 70–130)
GLUCOSE BLDC GLUCOMTR-MCNC: 217 MG/DL (ref 70–130)
GLUCOSE BLDC GLUCOMTR-MCNC: 221 MG/DL (ref 70–130)
GLUCOSE BLDC GLUCOMTR-MCNC: 222 MG/DL (ref 70–130)
GLUCOSE BLDC GLUCOMTR-MCNC: 229 MG/DL (ref 70–130)
GLUCOSE BLDC GLUCOMTR-MCNC: 240 MG/DL (ref 70–130)
GLUCOSE BLDC GLUCOMTR-MCNC: 249 MG/DL (ref 70–130)
GLUCOSE BLDC GLUCOMTR-MCNC: 250 MG/DL (ref 70–130)
GLUCOSE BLDC GLUCOMTR-MCNC: 263 MG/DL (ref 70–130)
GLUCOSE BLDC GLUCOMTR-MCNC: 273 MG/DL (ref 70–130)
GLUCOSE BLDC GLUCOMTR-MCNC: 288 MG/DL (ref 70–130)
GLUCOSE BLDC GLUCOMTR-MCNC: 292 MG/DL (ref 70–130)
GLUCOSE BLDC GLUCOMTR-MCNC: 305 MG/DL (ref 70–130)
GLUCOSE BLDC GLUCOMTR-MCNC: 326 MG/DL (ref 70–130)
GLUCOSE BLDC GLUCOMTR-MCNC: 95 MG/DL (ref 70–130)
GLUCOSE SERPL-MCNC: 120 MG/DL (ref 65–99)
GLUCOSE SERPL-MCNC: 171 MG/DL (ref 65–99)
GLUCOSE SERPL-MCNC: 183 MG/DL (ref 65–99)
GLUCOSE SERPL-MCNC: 189 MG/DL (ref 65–99)
GLUCOSE SERPL-MCNC: 213 MG/DL (ref 65–99)
GLUCOSE SERPL-MCNC: 274 MG/DL (ref 65–99)
GLUCOSE UR STRIP-MCNC: ABNORMAL MG/DL
GLUCOSE UR STRIP-MCNC: NEGATIVE MG/DL
HBA1C MFR BLD: 8.6 % (ref 4.8–5.6)
HCO3 BLDA-SCNC: 26.4 MMOL/L (ref 22–26)
HCO3 BLDA-SCNC: 26.4 MMOL/L (ref 22–26)
HCT VFR BLD AUTO: 40.3 % (ref 34–46.6)
HCT VFR BLD AUTO: 41.4 % (ref 34–46.6)
HCT VFR BLD AUTO: 42.4 % (ref 34–46.6)
HCT VFR BLD AUTO: 47 % (ref 34–46.6)
HCT VFR BLD AUTO: 47 % (ref 34–46.6)
HCT VFR BLD AUTO: 47.3 % (ref 34–46.6)
HCT VFR BLD AUTO: 49.5 % (ref 34–46.6)
HCT VFR BLDA CALC: 46 % (ref 38–51)
HCT VFR BLDA CALC: 47 % (ref 38–51)
HCYS SERPL-MCNC: 9 UMOL/L (ref 0–15)
HDLC SERPL-MCNC: 29 MG/DL (ref 40–60)
HGB BLD-MCNC: 13.6 G/DL (ref 12–15.9)
HGB BLD-MCNC: 13.8 G/DL (ref 12–15.9)
HGB BLD-MCNC: 14.2 G/DL (ref 12–15.9)
HGB BLD-MCNC: 15.8 G/DL (ref 12–15.9)
HGB BLD-MCNC: 15.9 G/DL (ref 12–15.9)
HGB BLD-MCNC: 16 G/DL (ref 12–15.9)
HGB BLD-MCNC: 16.3 G/DL (ref 12–15.9)
HGB BLDA-MCNC: 15.6 G/DL (ref 12–17)
HGB BLDA-MCNC: 16 G/DL (ref 12–17)
HGB UR QL STRIP.AUTO: NEGATIVE
HGB UR QL STRIP.AUTO: NEGATIVE
HOLD SPECIMEN: NORMAL
HYALINE CASTS UR QL AUTO: ABNORMAL /LPF
IMM GRANULOCYTES # BLD AUTO: 0.04 10*3/MM3 (ref 0–0.05)
IMM GRANULOCYTES # BLD AUTO: 0.05 10*3/MM3 (ref 0–0.05)
IMM GRANULOCYTES # BLD AUTO: 0.05 10*3/MM3 (ref 0–0.05)
IMM GRANULOCYTES # BLD AUTO: 0.07 10*3/MM3 (ref 0–0.05)
IMM GRANULOCYTES NFR BLD AUTO: 0.4 % (ref 0–0.5)
IMM GRANULOCYTES NFR BLD AUTO: 0.4 % (ref 0–0.5)
IMM GRANULOCYTES NFR BLD AUTO: 0.5 % (ref 0–0.5)
IMM GRANULOCYTES NFR BLD AUTO: 0.8 % (ref 0–0.5)
INR PPP: 0.95 (ref 0.85–1.16)
INR PPP: 1.4 (ref 0.8–1.2)
KETONES UR QL STRIP: NEGATIVE
KETONES UR QL STRIP: NEGATIVE
LA 2 SCREEN W REFLEX-IMP: NORMAL
LDLC SERPL CALC-MCNC: 50 MG/DL (ref 0–100)
LDLC/HDLC SERPL: 1.34 {RATIO}
LEFT ATRIUM VOLUME INDEX: 14.6 ML/M^2
LEFT ATRIUM VOLUME: 25.4 ML
LEUKOCYTE ESTERASE UR QL STRIP.AUTO: ABNORMAL
LEUKOCYTE ESTERASE UR QL STRIP.AUTO: NEGATIVE
LYMPHOCYTES # BLD AUTO: 1.7 10*3/MM3 (ref 0.7–3.1)
LYMPHOCYTES # BLD AUTO: 1.86 10*3/MM3 (ref 0.7–3.1)
LYMPHOCYTES # BLD AUTO: 2.44 10*3/MM3 (ref 0.7–3.1)
LYMPHOCYTES # BLD AUTO: 2.62 10*3/MM3 (ref 0.7–3.1)
LYMPHOCYTES NFR BLD AUTO: 17 % (ref 19.6–45.3)
LYMPHOCYTES NFR BLD AUTO: 20.4 % (ref 19.6–45.3)
LYMPHOCYTES NFR BLD AUTO: 22 % (ref 19.6–45.3)
LYMPHOCYTES NFR BLD AUTO: 24.3 % (ref 19.6–45.3)
MAGNESIUM SERPL-MCNC: 1.7 MG/DL (ref 1.6–2.6)
MAGNESIUM SERPL-MCNC: 1.8 MG/DL (ref 1.6–2.6)
MAGNESIUM SERPL-MCNC: 1.8 MG/DL (ref 1.6–2.6)
MAGNESIUM SERPL-MCNC: 1.9 MG/DL (ref 1.6–2.6)
MAXIMAL PREDICTED HEART RATE: 163 BPM
MAXIMAL PREDICTED HEART RATE: 163 BPM
MCH RBC QN AUTO: 28.7 PG (ref 26.6–33)
MCH RBC QN AUTO: 28.8 PG (ref 26.6–33)
MCH RBC QN AUTO: 28.8 PG (ref 26.6–33)
MCH RBC QN AUTO: 28.9 PG (ref 26.6–33)
MCH RBC QN AUTO: 28.9 PG (ref 26.6–33)
MCH RBC QN AUTO: 29.2 PG (ref 26.6–33)
MCH RBC QN AUTO: 29.2 PG (ref 26.6–33)
MCHC RBC AUTO-ENTMCNC: 32.3 G/DL (ref 31.5–35.7)
MCHC RBC AUTO-ENTMCNC: 33.3 G/DL (ref 31.5–35.7)
MCHC RBC AUTO-ENTMCNC: 33.5 G/DL (ref 31.5–35.7)
MCHC RBC AUTO-ENTMCNC: 33.6 G/DL (ref 31.5–35.7)
MCHC RBC AUTO-ENTMCNC: 33.7 G/DL (ref 31.5–35.7)
MCHC RBC AUTO-ENTMCNC: 33.8 G/DL (ref 31.5–35.7)
MCHC RBC AUTO-ENTMCNC: 34.5 G/DL (ref 31.5–35.7)
MCV RBC AUTO: 84.6 FL (ref 79–97)
MCV RBC AUTO: 85.1 FL (ref 79–97)
MCV RBC AUTO: 85.5 FL (ref 79–97)
MCV RBC AUTO: 86.4 FL (ref 79–97)
MCV RBC AUTO: 86.6 FL (ref 79–97)
MCV RBC AUTO: 86.7 FL (ref 79–97)
MCV RBC AUTO: 89.2 FL (ref 79–97)
MONOCYTES # BLD AUTO: 0.48 10*3/MM3 (ref 0.1–0.9)
MONOCYTES # BLD AUTO: 0.55 10*3/MM3 (ref 0.1–0.9)
MONOCYTES # BLD AUTO: 0.71 10*3/MM3 (ref 0.1–0.9)
MONOCYTES # BLD AUTO: 0.72 10*3/MM3 (ref 0.1–0.9)
MONOCYTES NFR BLD AUTO: 5.5 % (ref 5–12)
MONOCYTES NFR BLD AUTO: 5.8 % (ref 5–12)
MONOCYTES NFR BLD AUTO: 6 % (ref 5–12)
MONOCYTES NFR BLD AUTO: 6.6 % (ref 5–12)
MTHFR GENE MUT ANL BLD/T: NORMAL
NEUTROPHILS NFR BLD AUTO: 5.98 10*3/MM3 (ref 1.7–7)
NEUTROPHILS NFR BLD AUTO: 6.83 10*3/MM3 (ref 1.7–7)
NEUTROPHILS NFR BLD AUTO: 67.8 % (ref 42.7–76)
NEUTROPHILS NFR BLD AUTO: 70 % (ref 42.7–76)
NEUTROPHILS NFR BLD AUTO: 71.9 % (ref 42.7–76)
NEUTROPHILS NFR BLD AUTO: 75.1 % (ref 42.7–76)
NEUTROPHILS NFR BLD AUTO: 8.24 10*3/MM3 (ref 1.7–7)
NEUTROPHILS NFR BLD AUTO: 8.34 10*3/MM3 (ref 1.7–7)
NITRITE UR QL STRIP: NEGATIVE
NITRITE UR QL STRIP: NEGATIVE
NRBC BLD AUTO-RTO: 0 /100 WBC (ref 0–0.2)
PA ADP PRP-ACNC: 114 PRU
PCO2 BLDA: 36.4 MM HG (ref 35–45)
PCO2 BLDA: 44 MM HG (ref 35–45)
PH BLDA: 7.39 PH UNITS (ref 7.35–7.6)
PH BLDA: 7.47 PH UNITS (ref 7.35–7.6)
PH UR STRIP.AUTO: 6.5 [PH] (ref 5–8)
PH UR STRIP.AUTO: 7 [PH] (ref 5–8)
PHOSPHATE SERPL-MCNC: 2.6 MG/DL (ref 2.5–4.5)
PHOSPHATE SERPL-MCNC: 2.7 MG/DL (ref 2.5–4.5)
PLATELET # BLD AUTO: 199 10*3/MM3 (ref 140–450)
PLATELET # BLD AUTO: 200 10*3/MM3 (ref 140–450)
PLATELET # BLD AUTO: 204 10*3/MM3 (ref 140–450)
PLATELET # BLD AUTO: 228 10*3/MM3 (ref 140–450)
PLATELET # BLD AUTO: 229 10*3/MM3 (ref 140–450)
PLATELET # BLD AUTO: 244 10*3/MM3 (ref 140–450)
PLATELET # BLD AUTO: 252 10*3/MM3 (ref 140–450)
PMV BLD AUTO: 10.5 FL (ref 6–12)
PMV BLD AUTO: 10.8 FL (ref 6–12)
PMV BLD AUTO: 10.8 FL (ref 6–12)
PMV BLD AUTO: 10.9 FL (ref 6–12)
PMV BLD AUTO: 10.9 FL (ref 6–12)
PMV BLD AUTO: 11 FL (ref 6–12)
PMV BLD AUTO: 11.2 FL (ref 6–12)
PO2 BLDA: 36 MMHG (ref 80–105)
PO2 BLDA: 47 MMHG (ref 80–105)
POTASSIUM BLDA-SCNC: 3.8 MMOL/L (ref 3.5–4.9)
POTASSIUM BLDA-SCNC: 4 MMOL/L (ref 3.5–4.9)
POTASSIUM SERPL-SCNC: 3.2 MMOL/L (ref 3.5–5.2)
POTASSIUM SERPL-SCNC: 3.3 MMOL/L (ref 3.5–5.2)
POTASSIUM SERPL-SCNC: 3.4 MMOL/L (ref 3.5–5.2)
POTASSIUM SERPL-SCNC: 3.4 MMOL/L (ref 3.5–5.2)
POTASSIUM SERPL-SCNC: 3.6 MMOL/L (ref 3.5–5.2)
POTASSIUM SERPL-SCNC: 3.7 MMOL/L (ref 3.5–5.2)
POTASSIUM SERPL-SCNC: 3.8 MMOL/L (ref 3.5–5.2)
POTASSIUM SERPL-SCNC: 3.9 MMOL/L (ref 3.5–5.2)
PROT C ACT/NOR PPP: 154 % (ref 73–180)
PROT S ACT/NOR PPP: 85 % (ref 63–140)
PROT SERPL-MCNC: 5.9 G/DL (ref 6–8.5)
PROT UR QL STRIP: ABNORMAL
PROT UR QL STRIP: NEGATIVE
PROTHROMBIN TIME: 12.4 SECONDS (ref 11.4–14.4)
PROTHROMBIN TIME: 16.5 SECONDS (ref 12.8–15.2)
QT INTERVAL: 388 MS
QT INTERVAL: 402 MS
QTC INTERVAL: 440 MS
QTC INTERVAL: 461 MS
RBC # BLD AUTO: 4.65 10*6/MM3 (ref 3.77–5.28)
RBC # BLD AUTO: 4.78 10*6/MM3 (ref 3.77–5.28)
RBC # BLD AUTO: 4.91 10*6/MM3 (ref 3.77–5.28)
RBC # BLD AUTO: 5.5 10*6/MM3 (ref 3.77–5.28)
RBC # BLD AUTO: 5.52 10*6/MM3 (ref 3.77–5.28)
RBC # BLD AUTO: 5.55 10*6/MM3 (ref 3.77–5.28)
RBC # BLD AUTO: 5.59 10*6/MM3 (ref 3.77–5.28)
RBC # UR STRIP: ABNORMAL /HPF
REF LAB TEST METHOD: ABNORMAL
RSV RNA NPH QL NAA+NON-PROBE: NOT DETECTED
SAO2 % BLDA: 67 % (ref 95–98)
SAO2 % BLDA: 85 % (ref 95–98)
SARS-COV-2 RNA PNL SPEC NAA+PROBE: NOT DETECTED
SARS-COV-2 RNA RESP QL NAA+PROBE: NOT DETECTED
SCREEN APTT: 33.5 SEC (ref 0–51.9)
SCREEN DRVVT: 30.8 SEC (ref 0–47)
SODIUM BLD-SCNC: 141 MMOL/L (ref 138–146)
SODIUM BLD-SCNC: 141 MMOL/L (ref 138–146)
SODIUM SERPL-SCNC: 138 MMOL/L (ref 136–145)
SODIUM SERPL-SCNC: 139 MMOL/L (ref 136–145)
SODIUM SERPL-SCNC: 139 MMOL/L (ref 136–145)
SODIUM SERPL-SCNC: 141 MMOL/L (ref 136–145)
SODIUM SERPL-SCNC: 142 MMOL/L (ref 136–145)
SODIUM SERPL-SCNC: 142 MMOL/L (ref 136–145)
SP GR UR STRIP: 1.02 (ref 1–1.03)
SP GR UR STRIP: 1.05 (ref 1–1.03)
SQUAMOUS #/AREA URNS HPF: ABNORMAL /HPF
STRESS TARGET HR: 139 BPM
STRESS TARGET HR: 139 BPM
THROMBIN TIME: 16.8 SEC (ref 0–23)
TRIGL SERPL-MCNC: 255 MG/DL (ref 0–150)
TROPONIN T SERPL-MCNC: <0.01 NG/ML (ref 0–0.03)
TROPONIN T SERPL-MCNC: <0.01 NG/ML (ref 0–0.03)
UFH PPP CHRO-ACNC: 0.1 IU/ML (ref 0.3–0.7)
UROBILINOGEN UR QL STRIP: ABNORMAL
UROBILINOGEN UR QL STRIP: ABNORMAL
VLDLC SERPL-MCNC: 40 MG/DL (ref 5–40)
WBC # UR STRIP: ABNORMAL /HPF
WBC NRBC COR # BLD: 10.06 10*3/MM3 (ref 3.4–10.8)
WBC NRBC COR # BLD: 10.95 10*3/MM3 (ref 3.4–10.8)
WBC NRBC COR # BLD: 11.91 10*3/MM3 (ref 3.4–10.8)
WBC NRBC COR # BLD: 12.66 10*3/MM3 (ref 3.4–10.8)
WBC NRBC COR # BLD: 8.18 10*3/MM3 (ref 3.4–10.8)
WBC NRBC COR # BLD: 8.32 10*3/MM3 (ref 3.4–10.8)
WBC NRBC COR # BLD: 9.66 10*3/MM3 (ref 3.4–10.8)
WHOLE BLOOD HOLD SPECIMEN: NORMAL

## 2022-01-01 PROCEDURE — 63710000001 INSULIN LISPRO (HUMAN) PER 5 UNITS: Performed by: FAMILY MEDICINE

## 2022-01-01 PROCEDURE — 94799 UNLISTED PULMONARY SVC/PX: CPT

## 2022-01-01 PROCEDURE — 70450 CT HEAD/BRAIN W/O DYE: CPT

## 2022-01-01 PROCEDURE — 97110 THERAPEUTIC EXERCISES: CPT

## 2022-01-01 PROCEDURE — 85014 HEMATOCRIT: CPT

## 2022-01-01 PROCEDURE — 85730 THROMBOPLASTIN TIME PARTIAL: CPT | Performed by: EMERGENCY MEDICINE

## 2022-01-01 PROCEDURE — 99233 SBSQ HOSP IP/OBS HIGH 50: CPT | Performed by: FAMILY MEDICINE

## 2022-01-01 PROCEDURE — 84132 ASSAY OF SERUM POTASSIUM: CPT

## 2022-01-01 PROCEDURE — 84484 ASSAY OF TROPONIN QUANT: CPT | Performed by: EMERGENCY MEDICINE

## 2022-01-01 PROCEDURE — 82962 GLUCOSE BLOOD TEST: CPT

## 2022-01-01 PROCEDURE — 85670 THROMBIN TIME PLASMA: CPT | Performed by: NURSE PRACTITIONER

## 2022-01-01 PROCEDURE — P9612 CATHETERIZE FOR URINE SPEC: HCPCS

## 2022-01-01 PROCEDURE — 87040 BLOOD CULTURE FOR BACTERIA: CPT | Performed by: EMERGENCY MEDICINE

## 2022-01-01 PROCEDURE — 97530 THERAPEUTIC ACTIVITIES: CPT

## 2022-01-01 PROCEDURE — 92523 SPEECH SOUND LANG COMPREHEN: CPT

## 2022-01-01 PROCEDURE — 99291 CRITICAL CARE FIRST HOUR: CPT | Performed by: STUDENT IN AN ORGANIZED HEALTH CARE EDUCATION/TRAINING PROGRAM

## 2022-01-01 PROCEDURE — 0042T HC CT CEREBRAL PERFUSION W/WO CONTRAST: CPT

## 2022-01-01 PROCEDURE — 99232 SBSQ HOSP IP/OBS MODERATE 35: CPT | Performed by: NURSE PRACTITIONER

## 2022-01-01 PROCEDURE — 70498 CT ANGIOGRAPHY NECK: CPT

## 2022-01-01 PROCEDURE — 97165 OT EVAL LOW COMPLEX 30 MIN: CPT | Performed by: OCCUPATIONAL THERAPIST

## 2022-01-01 PROCEDURE — 83735 ASSAY OF MAGNESIUM: CPT | Performed by: INTERNAL MEDICINE

## 2022-01-01 PROCEDURE — 99233 SBSQ HOSP IP/OBS HIGH 50: CPT | Performed by: STUDENT IN AN ORGANIZED HEALTH CARE EDUCATION/TRAINING PROGRAM

## 2022-01-01 PROCEDURE — 81003 URINALYSIS AUTO W/O SCOPE: CPT | Performed by: INTERNAL MEDICINE

## 2022-01-01 PROCEDURE — 84460 ALANINE AMINO (ALT) (SGPT): CPT | Performed by: EMERGENCY MEDICINE

## 2022-01-01 PROCEDURE — 86140 C-REACTIVE PROTEIN: CPT | Performed by: NURSE PRACTITIONER

## 2022-01-01 PROCEDURE — 0 MAGNESIUM SULFATE 4 GM/100ML SOLUTION: Performed by: FAMILY MEDICINE

## 2022-01-01 PROCEDURE — 63710000001 INSULIN REGULAR HUMAN PER 5 UNITS: Performed by: NURSE PRACTITIONER

## 2022-01-01 PROCEDURE — 81291 MTHFR GENE: CPT | Performed by: NURSE PRACTITIONER

## 2022-01-01 PROCEDURE — 80048 BASIC METABOLIC PNL TOTAL CA: CPT | Performed by: STUDENT IN AN ORGANIZED HEALTH CARE EDUCATION/TRAINING PROGRAM

## 2022-01-01 PROCEDURE — G0152 HHCP-SERV OF OT,EA 15 MIN: HCPCS

## 2022-01-01 PROCEDURE — 36415 COLL VENOUS BLD VENIPUNCTURE: CPT

## 2022-01-01 PROCEDURE — 99233 SBSQ HOSP IP/OBS HIGH 50: CPT | Performed by: NURSE PRACTITIONER

## 2022-01-01 PROCEDURE — 85520 HEPARIN ASSAY: CPT | Performed by: NURSE PRACTITIONER

## 2022-01-01 PROCEDURE — 74018 RADEX ABDOMEN 1 VIEW: CPT

## 2022-01-01 PROCEDURE — 84132 ASSAY OF SERUM POTASSIUM: CPT | Performed by: STUDENT IN AN ORGANIZED HEALTH CARE EDUCATION/TRAINING PROGRAM

## 2022-01-01 PROCEDURE — 25010000002 PROPOFOL 10 MG/ML EMULSION: Performed by: NURSE ANESTHETIST, CERTIFIED REGISTERED

## 2022-01-01 PROCEDURE — 87637 SARSCOV2&INF A&B&RSV AMP PRB: CPT | Performed by: EMERGENCY MEDICINE

## 2022-01-01 PROCEDURE — G0299 HHS/HOSPICE OF RN EA 15 MIN: HCPCS

## 2022-01-01 PROCEDURE — 86038 ANTINUCLEAR ANTIBODIES: CPT | Performed by: NURSE PRACTITIONER

## 2022-01-01 PROCEDURE — 82803 BLOOD GASES ANY COMBINATION: CPT

## 2022-01-01 PROCEDURE — 81001 URINALYSIS AUTO W/SCOPE: CPT | Performed by: NURSE PRACTITIONER

## 2022-01-01 PROCEDURE — 63710000001 INSULIN REGULAR HUMAN PER 5 UNITS: Performed by: INTERNAL MEDICINE

## 2022-01-01 PROCEDURE — 25010000002 HALOPERIDOL LACTATE PER 5 MG: Performed by: STUDENT IN AN ORGANIZED HEALTH CARE EDUCATION/TRAINING PROGRAM

## 2022-01-01 PROCEDURE — 99213 OFFICE O/P EST LOW 20 MIN: CPT | Performed by: PHYSICIAN ASSISTANT

## 2022-01-01 PROCEDURE — 63710000001 INSULIN LISPRO (HUMAN) PER 5 UNITS: Performed by: INTERNAL MEDICINE

## 2022-01-01 PROCEDURE — 97116 GAIT TRAINING THERAPY: CPT

## 2022-01-01 PROCEDURE — 99232 SBSQ HOSP IP/OBS MODERATE 35: CPT

## 2022-01-01 PROCEDURE — 99239 HOSP IP/OBS DSCHRG MGMT >30: CPT | Performed by: PHYSICIAN ASSISTANT

## 2022-01-01 PROCEDURE — 82565 ASSAY OF CREATININE: CPT

## 2022-01-01 PROCEDURE — 97530 THERAPEUTIC ACTIVITIES: CPT | Performed by: OCCUPATIONAL THERAPIST

## 2022-01-01 PROCEDURE — 97535 SELF CARE MNGMENT TRAINING: CPT | Performed by: OCCUPATIONAL THERAPIST

## 2022-01-01 PROCEDURE — 92610 EVALUATE SWALLOWING FUNCTION: CPT

## 2022-01-01 PROCEDURE — 99214 OFFICE O/P EST MOD 30 MIN: CPT | Performed by: FAMILY MEDICINE

## 2022-01-01 PROCEDURE — 25010000002 HEPARIN (PORCINE) PER 1000 UNITS: Performed by: NURSE PRACTITIONER

## 2022-01-01 PROCEDURE — 3E0G76Z INTRODUCTION OF NUTRITIONAL SUBSTANCE INTO UPPER GI, VIA NATURAL OR ARTIFICIAL OPENING: ICD-10-PCS | Performed by: INTERNAL MEDICINE

## 2022-01-01 PROCEDURE — 94640 AIRWAY INHALATION TREATMENT: CPT

## 2022-01-01 PROCEDURE — 93005 ELECTROCARDIOGRAM TRACING: CPT | Performed by: EMERGENCY MEDICINE

## 2022-01-01 PROCEDURE — 86147 CARDIOLIPIN ANTIBODY EA IG: CPT | Performed by: NURSE PRACTITIONER

## 2022-01-01 PROCEDURE — 71045 X-RAY EXAM CHEST 1 VIEW: CPT

## 2022-01-01 PROCEDURE — 92507 TX SP LANG VOICE COMM INDIV: CPT

## 2022-01-01 PROCEDURE — 97162 PT EVAL MOD COMPLEX 30 MIN: CPT

## 2022-01-01 PROCEDURE — 0 IOPAMIDOL PER 1 ML: Performed by: EMERGENCY MEDICINE

## 2022-01-01 PROCEDURE — 99291 CRITICAL CARE FIRST HOUR: CPT

## 2022-01-01 PROCEDURE — G0108 DIAB MANAGE TRN  PER INDIV: HCPCS | Performed by: NURSE PRACTITIONER

## 2022-01-01 PROCEDURE — 99285 EMERGENCY DEPT VISIT HI MDM: CPT

## 2022-01-01 PROCEDURE — 70551 MRI BRAIN STEM W/O DYE: CPT

## 2022-01-01 PROCEDURE — 93970 EXTREMITY STUDY: CPT

## 2022-01-01 PROCEDURE — 93272 ECG/REVIEW INTERPRET ONLY: CPT | Performed by: INTERNAL MEDICINE

## 2022-01-01 PROCEDURE — 83036 HEMOGLOBIN GLYCOSYLATED A1C: CPT | Performed by: NURSE PRACTITIONER

## 2022-01-01 PROCEDURE — 85576 BLOOD PLATELET AGGREGATION: CPT | Performed by: NURSE PRACTITIONER

## 2022-01-01 PROCEDURE — 85025 COMPLETE CBC W/AUTO DIFF WBC: CPT | Performed by: INTERNAL MEDICINE

## 2022-01-01 PROCEDURE — 93306 TTE W/DOPPLER COMPLETE: CPT | Performed by: INTERNAL MEDICINE

## 2022-01-01 PROCEDURE — 82330 ASSAY OF CALCIUM: CPT

## 2022-01-01 PROCEDURE — 81241 F5 GENE: CPT | Performed by: NURSE PRACTITIONER

## 2022-01-01 PROCEDURE — 85025 COMPLETE CBC W/AUTO DIFF WBC: CPT | Performed by: EMERGENCY MEDICINE

## 2022-01-01 PROCEDURE — 99232 SBSQ HOSP IP/OBS MODERATE 35: CPT | Performed by: STUDENT IN AN ORGANIZED HEALTH CARE EDUCATION/TRAINING PROGRAM

## 2022-01-01 PROCEDURE — 0DH68UZ INSERTION OF FEEDING DEVICE INTO STOMACH, VIA NATURAL OR ARTIFICIAL OPENING ENDOSCOPIC: ICD-10-PCS | Performed by: INTERNAL MEDICINE

## 2022-01-01 PROCEDURE — 63710000001 INSULIN DETEMIR PER 5 UNITS: Performed by: INTERNAL MEDICINE

## 2022-01-01 PROCEDURE — 83605 ASSAY OF LACTIC ACID: CPT | Performed by: EMERGENCY MEDICINE

## 2022-01-01 PROCEDURE — 80048 BASIC METABOLIC PNL TOTAL CA: CPT

## 2022-01-01 PROCEDURE — 85306 CLOT INHIBIT PROT S FREE: CPT | Performed by: NURSE PRACTITIONER

## 2022-01-01 PROCEDURE — 80048 BASIC METABOLIC PNL TOTAL CA: CPT | Performed by: FAMILY MEDICINE

## 2022-01-01 PROCEDURE — 43246 EGD PLACE GASTROSTOMY TUBE: CPT | Performed by: INTERNAL MEDICINE

## 2022-01-01 PROCEDURE — 85732 THROMBOPLASTIN TIME PARTIAL: CPT | Performed by: NURSE PRACTITIONER

## 2022-01-01 PROCEDURE — 85300 ANTITHROMBIN III ACTIVITY: CPT | Performed by: NURSE PRACTITIONER

## 2022-01-01 PROCEDURE — 84100 ASSAY OF PHOSPHORUS: CPT | Performed by: NURSE PRACTITIONER

## 2022-01-01 PROCEDURE — 70496 CT ANGIOGRAPHY HEAD: CPT

## 2022-01-01 PROCEDURE — 99232 SBSQ HOSP IP/OBS MODERATE 35: CPT | Performed by: FAMILY MEDICINE

## 2022-01-01 PROCEDURE — 85303 CLOT INHIBIT PROT C ACTIVITY: CPT | Performed by: INTERNAL MEDICINE

## 2022-01-01 PROCEDURE — 85220 BLOOC CLOT FACTOR V TEST: CPT | Performed by: NURSE PRACTITIONER

## 2022-01-01 PROCEDURE — C1769 GUIDE WIRE: HCPCS | Performed by: INTERNAL MEDICINE

## 2022-01-01 PROCEDURE — 97165 OT EVAL LOW COMPLEX 30 MIN: CPT

## 2022-01-01 PROCEDURE — 84450 TRANSFERASE (AST) (SGOT): CPT | Performed by: EMERGENCY MEDICINE

## 2022-01-01 PROCEDURE — 85027 COMPLETE CBC AUTOMATED: CPT | Performed by: NURSE PRACTITIONER

## 2022-01-01 PROCEDURE — 63710000001 INSULIN DETEMIR PER 5 UNITS: Performed by: FAMILY MEDICINE

## 2022-01-01 PROCEDURE — 87077 CULTURE AEROBIC IDENTIFY: CPT | Performed by: NURSE PRACTITIONER

## 2022-01-01 PROCEDURE — G0151 HHCP-SERV OF PT,EA 15 MIN: HCPCS

## 2022-01-01 PROCEDURE — 99254 IP/OBS CNSLTJ NEW/EST MOD 60: CPT | Performed by: NURSE PRACTITIONER

## 2022-01-01 PROCEDURE — 85027 COMPLETE CBC AUTOMATED: CPT | Performed by: FAMILY MEDICINE

## 2022-01-01 PROCEDURE — 83735 ASSAY OF MAGNESIUM: CPT | Performed by: FAMILY MEDICINE

## 2022-01-01 PROCEDURE — 80048 BASIC METABOLIC PNL TOTAL CA: CPT | Performed by: NURSE PRACTITIONER

## 2022-01-01 PROCEDURE — 92526 ORAL FUNCTION THERAPY: CPT

## 2022-01-01 PROCEDURE — 84295 ASSAY OF SERUM SODIUM: CPT

## 2022-01-01 PROCEDURE — 83090 ASSAY OF HOMOCYSTEINE: CPT | Performed by: NURSE PRACTITIONER

## 2022-01-01 PROCEDURE — 87086 URINE CULTURE/COLONY COUNT: CPT | Performed by: INTERNAL MEDICINE

## 2022-01-01 PROCEDURE — 85652 RBC SED RATE AUTOMATED: CPT | Performed by: NURSE PRACTITIONER

## 2022-01-01 PROCEDURE — 99233 SBSQ HOSP IP/OBS HIGH 50: CPT | Performed by: INTERNAL MEDICINE

## 2022-01-01 PROCEDURE — 85379 FIBRIN DEGRADATION QUANT: CPT | Performed by: NURSE PRACTITIONER

## 2022-01-01 PROCEDURE — 99222 1ST HOSP IP/OBS MODERATE 55: CPT | Performed by: NURSE PRACTITIONER

## 2022-01-01 PROCEDURE — 83735 ASSAY OF MAGNESIUM: CPT | Performed by: NURSE PRACTITIONER

## 2022-01-01 PROCEDURE — G0153 HHCP-SVS OF S/L PATH,EA 15MN: HCPCS

## 2022-01-01 PROCEDURE — 87086 URINE CULTURE/COLONY COUNT: CPT | Performed by: NURSE PRACTITIONER

## 2022-01-01 PROCEDURE — 81240 F2 GENE: CPT | Performed by: NURSE PRACTITIONER

## 2022-01-01 PROCEDURE — 82947 ASSAY GLUCOSE BLOOD QUANT: CPT

## 2022-01-01 PROCEDURE — 85610 PROTHROMBIN TIME: CPT | Performed by: NURSE PRACTITIONER

## 2022-01-01 PROCEDURE — 85610 PROTHROMBIN TIME: CPT

## 2022-01-01 PROCEDURE — 85025 COMPLETE CBC W/AUTO DIFF WBC: CPT | Performed by: NURSE PRACTITIONER

## 2022-01-01 PROCEDURE — 85705 THROMBOPLASTIN INHIBITION: CPT | Performed by: NURSE PRACTITIONER

## 2022-01-01 PROCEDURE — 87186 SC STD MICRODIL/AGAR DIL: CPT | Performed by: NURSE PRACTITIONER

## 2022-01-01 PROCEDURE — 94761 N-INVAS EAR/PLS OXIMETRY MLT: CPT

## 2022-01-01 PROCEDURE — 95819 EEG AWAKE AND ASLEEP: CPT

## 2022-01-01 PROCEDURE — 85613 RUSSELL VIPER VENOM DILUTED: CPT | Performed by: NURSE PRACTITIONER

## 2022-01-01 PROCEDURE — 80061 LIPID PANEL: CPT | Performed by: NURSE PRACTITIONER

## 2022-01-01 PROCEDURE — 84132 ASSAY OF SERUM POTASSIUM: CPT | Performed by: ANESTHESIOLOGY

## 2022-01-01 PROCEDURE — U0004 COV-19 TEST NON-CDC HGH THRU: HCPCS | Performed by: INTERNAL MEDICINE

## 2022-01-01 PROCEDURE — 84100 ASSAY OF PHOSPHORUS: CPT | Performed by: INTERNAL MEDICINE

## 2022-01-01 PROCEDURE — 80053 COMPREHEN METABOLIC PANEL: CPT | Performed by: INTERNAL MEDICINE

## 2022-01-01 PROCEDURE — 99223 1ST HOSP IP/OBS HIGH 75: CPT | Performed by: INTERNAL MEDICINE

## 2022-01-01 PROCEDURE — 86146 BETA-2 GLYCOPROTEIN ANTIBODY: CPT | Performed by: NURSE PRACTITIONER

## 2022-01-01 PROCEDURE — 99231 SBSQ HOSP IP/OBS SF/LOW 25: CPT | Performed by: NURSE PRACTITIONER

## 2022-01-01 PROCEDURE — 93306 TTE W/DOPPLER COMPLETE: CPT

## 2022-01-01 PROCEDURE — 85730 THROMBOPLASTIN TIME PARTIAL: CPT | Performed by: NURSE PRACTITIONER

## 2022-01-01 PROCEDURE — 99239 HOSP IP/OBS DSCHRG MGMT >30: CPT | Performed by: NURSE PRACTITIONER

## 2022-01-01 PROCEDURE — 25010000002 HEPARIN (PORCINE) PER 1000 UNITS

## 2022-01-01 RX ORDER — POTASSIUM CHLORIDE 7.45 MG/ML
10 INJECTION INTRAVENOUS
Status: DISCONTINUED | OUTPATIENT
Start: 2022-01-01 | End: 2022-01-01 | Stop reason: HOSPADM

## 2022-01-01 RX ORDER — ATORVASTATIN CALCIUM 40 MG/1
80 TABLET, FILM COATED ORAL NIGHTLY
Status: DISCONTINUED | OUTPATIENT
Start: 2022-01-01 | End: 2022-01-01 | Stop reason: HOSPADM

## 2022-01-01 RX ORDER — DIAZEPAM 2 MG/1
2 TABLET ORAL NIGHTLY
Qty: 3 TABLET | Refills: 0 | Status: CANCELLED | OUTPATIENT
Start: 2022-01-01

## 2022-01-01 RX ORDER — LIDOCAINE HYDROCHLORIDE 10 MG/ML
INJECTION, SOLUTION EPIDURAL; INFILTRATION; INTRACAUDAL; PERINEURAL AS NEEDED
Status: DISCONTINUED | OUTPATIENT
Start: 2022-01-01 | End: 2022-01-01 | Stop reason: SURG

## 2022-01-01 RX ORDER — SODIUM CHLORIDE 0.9 % (FLUSH) 0.9 %
10 SYRINGE (ML) INJECTION EVERY 12 HOURS SCHEDULED
Status: DISCONTINUED | OUTPATIENT
Start: 2022-01-01 | End: 2022-01-01

## 2022-01-01 RX ORDER — ASPIRIN 325 MG
325 TABLET ORAL DAILY
Status: DISCONTINUED | OUTPATIENT
Start: 2022-01-01 | End: 2022-01-01

## 2022-01-01 RX ORDER — HEPARIN SOD,PORCINE/0.9 % NACL 25000/250
12 INTRAVENOUS SOLUTION INTRAVENOUS
Status: DISPENSED | OUTPATIENT
Start: 2022-01-01 | End: 2022-01-01

## 2022-01-01 RX ORDER — LISINOPRIL 10 MG/1
10 TABLET ORAL
Status: DISCONTINUED | OUTPATIENT
Start: 2022-01-01 | End: 2022-01-01 | Stop reason: HOSPADM

## 2022-01-01 RX ORDER — OXYCODONE HYDROCHLORIDE 5 MG/1
5 TABLET ORAL EVERY 4 HOURS PRN
COMMUNITY
End: 2022-01-01 | Stop reason: HOSPADM

## 2022-01-01 RX ORDER — PANTOPRAZOLE SODIUM 40 MG/10ML
40 INJECTION, POWDER, LYOPHILIZED, FOR SOLUTION INTRAVENOUS
Status: DISCONTINUED | OUTPATIENT
Start: 2022-01-01 | End: 2022-01-01 | Stop reason: HOSPADM

## 2022-01-01 RX ORDER — MAGNESIUM SULFATE HEPTAHYDRATE 40 MG/ML
2 INJECTION, SOLUTION INTRAVENOUS AS NEEDED
Status: DISCONTINUED | OUTPATIENT
Start: 2022-01-01 | End: 2022-01-01 | Stop reason: HOSPADM

## 2022-01-01 RX ORDER — MAGNESIUM SULFATE HEPTAHYDRATE 40 MG/ML
4 INJECTION, SOLUTION INTRAVENOUS AS NEEDED
Status: DISCONTINUED | OUTPATIENT
Start: 2022-01-01 | End: 2022-01-01 | Stop reason: HOSPADM

## 2022-01-01 RX ORDER — CEFDINIR 250 MG/5ML
300 POWDER, FOR SUSPENSION ORAL EVERY 12 HOURS SCHEDULED
Qty: 60 ML | Refills: 0 | Status: SHIPPED | OUTPATIENT
Start: 2022-01-01 | End: 2022-01-01

## 2022-01-01 RX ORDER — POTASSIUM CHLORIDE 1.5 G/1.77G
40 POWDER, FOR SOLUTION ORAL AS NEEDED
Status: DISCONTINUED | OUTPATIENT
Start: 2022-01-01 | End: 2022-01-01 | Stop reason: HOSPADM

## 2022-01-01 RX ORDER — AMMONIUM LACTATE 120 MG/G
1 CREAM TOPICAL 2 TIMES DAILY
COMMUNITY

## 2022-01-01 RX ORDER — BISACODYL 10 MG
10 SUPPOSITORY, RECTAL RECTAL DAILY
Status: DISCONTINUED | OUTPATIENT
Start: 2022-01-01 | End: 2022-01-01 | Stop reason: HOSPADM

## 2022-01-01 RX ORDER — ASPIRIN 81 MG/1
81 TABLET, CHEWABLE ORAL DAILY
Status: DISCONTINUED | OUTPATIENT
Start: 2022-01-01 | End: 2022-01-01

## 2022-01-01 RX ORDER — BACLOFEN 20 MG/1
10 TABLET ORAL 3 TIMES DAILY PRN
Qty: 60 TABLET | Refills: 5
Start: 2022-01-01 | End: 2022-01-01 | Stop reason: HOSPADM

## 2022-01-01 RX ORDER — AMOXICILLIN 250 MG
2 CAPSULE ORAL 2 TIMES DAILY
Status: DISCONTINUED | OUTPATIENT
Start: 2022-01-01 | End: 2022-01-01 | Stop reason: HOSPADM

## 2022-01-01 RX ORDER — CLOPIDOGREL BISULFATE 75 MG/1
75 TABLET ORAL DAILY
Qty: 30 TABLET
Start: 2022-01-01 | End: 2022-01-01 | Stop reason: HOSPADM

## 2022-01-01 RX ORDER — HYDROCODONE BITARTRATE AND ACETAMINOPHEN 10; 325 MG/1; MG/1
1 TABLET ORAL 2 TIMES DAILY
Status: DISCONTINUED | OUTPATIENT
Start: 2022-01-01 | End: 2022-01-01

## 2022-01-01 RX ORDER — DIAZEPAM 2 MG/1
2 TABLET ORAL 2 TIMES DAILY
Status: DISCONTINUED | OUTPATIENT
Start: 2022-01-01 | End: 2022-01-01

## 2022-01-01 RX ORDER — POTASSIUM CHLORIDE 1.5 G/1.77G
40 POWDER, FOR SOLUTION ORAL AS NEEDED
Status: DISCONTINUED | OUTPATIENT
Start: 2022-01-01 | End: 2022-01-01 | Stop reason: SDUPTHER

## 2022-01-01 RX ORDER — FLUCONAZOLE 100 MG/1
100 TABLET ORAL DAILY
Qty: 7 TABLET | Refills: 0 | Status: SHIPPED | OUTPATIENT
Start: 2022-01-01 | End: 2022-01-01 | Stop reason: SDUPTHER

## 2022-01-01 RX ORDER — MIDAZOLAM HYDROCHLORIDE 1 MG/ML
1 INJECTION INTRAMUSCULAR; INTRAVENOUS
Status: DISCONTINUED | OUTPATIENT
Start: 2022-01-01 | End: 2022-01-01

## 2022-01-01 RX ORDER — SODIUM CHLORIDE, SODIUM LACTATE, POTASSIUM CHLORIDE, CALCIUM CHLORIDE 600; 310; 30; 20 MG/100ML; MG/100ML; MG/100ML; MG/100ML
9 INJECTION, SOLUTION INTRAVENOUS CONTINUOUS
Status: DISCONTINUED | OUTPATIENT
Start: 2022-01-01 | End: 2022-01-01

## 2022-01-01 RX ORDER — POTASSIUM CHLORIDE 7.45 MG/ML
10 INJECTION INTRAVENOUS
Status: DISCONTINUED | OUTPATIENT
Start: 2022-01-01 | End: 2022-01-01 | Stop reason: SDUPTHER

## 2022-01-01 RX ORDER — CEFDINIR 300 MG/1
300 CAPSULE ORAL EVERY 12 HOURS SCHEDULED
Status: DISCONTINUED | OUTPATIENT
Start: 2022-01-01 | End: 2022-01-01 | Stop reason: HOSPADM

## 2022-01-01 RX ORDER — HEPARIN SOD,PORCINE/0.9 % NACL 25000/250
12 INTRAVENOUS SOLUTION INTRAVENOUS
Status: DISCONTINUED | OUTPATIENT
Start: 2022-01-01 | End: 2022-01-01

## 2022-01-01 RX ORDER — ASPIRIN 81 MG/1
81 TABLET, CHEWABLE ORAL DAILY
Status: DISCONTINUED | OUTPATIENT
Start: 2022-01-01 | End: 2022-01-01 | Stop reason: HOSPADM

## 2022-01-01 RX ORDER — DIAZEPAM 2 MG/1
2 TABLET ORAL NIGHTLY
Qty: 3 TABLET | Refills: 0 | Status: SHIPPED | OUTPATIENT
Start: 2022-01-01 | End: 2022-01-01 | Stop reason: SDUPTHER

## 2022-01-01 RX ORDER — NICOTINE POLACRILEX 4 MG
15 LOZENGE BUCCAL
Status: DISCONTINUED | OUTPATIENT
Start: 2022-01-01 | End: 2022-01-01 | Stop reason: HOSPADM

## 2022-01-01 RX ORDER — ATORVASTATIN CALCIUM 40 MG/1
80 TABLET, FILM COATED ORAL NIGHTLY
Status: DISCONTINUED | OUTPATIENT
Start: 2022-01-01 | End: 2022-01-01

## 2022-01-01 RX ORDER — SODIUM CHLORIDE 0.9 % (FLUSH) 0.9 %
10 SYRINGE (ML) INJECTION AS NEEDED
Status: DISCONTINUED | OUTPATIENT
Start: 2022-01-01 | End: 2022-01-01

## 2022-01-01 RX ORDER — HYDROCODONE BITARTRATE AND ACETAMINOPHEN 10; 325 MG/1; MG/1
1 TABLET ORAL DAILY
Status: DISCONTINUED | OUTPATIENT
Start: 2022-01-01 | End: 2022-01-01

## 2022-01-01 RX ORDER — IPRATROPIUM BROMIDE AND ALBUTEROL SULFATE 2.5; .5 MG/3ML; MG/3ML
3 SOLUTION RESPIRATORY (INHALATION) EVERY 4 HOURS PRN
Status: DISCONTINUED | OUTPATIENT
Start: 2022-01-01 | End: 2022-01-01 | Stop reason: HOSPADM

## 2022-01-01 RX ORDER — CLOPIDOGREL BISULFATE 75 MG/1
300 TABLET ORAL ONCE
Status: COMPLETED | OUTPATIENT
Start: 2022-01-01 | End: 2022-01-01

## 2022-01-01 RX ORDER — ASPIRIN 300 MG/1
300 SUPPOSITORY RECTAL DAILY
Status: DISCONTINUED | OUTPATIENT
Start: 2022-01-01 | End: 2022-01-01

## 2022-01-01 RX ORDER — BISACODYL 10 MG
10 SUPPOSITORY, RECTAL RECTAL DAILY
Status: DISPENSED | OUTPATIENT
Start: 2022-01-01 | End: 2022-01-01

## 2022-01-01 RX ORDER — LIDOCAINE HYDROCHLORIDE 10 MG/ML
0.5 INJECTION, SOLUTION EPIDURAL; INFILTRATION; INTRACAUDAL; PERINEURAL ONCE AS NEEDED
Status: DISCONTINUED | OUTPATIENT
Start: 2022-01-01 | End: 2022-01-01 | Stop reason: HOSPADM

## 2022-01-01 RX ORDER — SODIUM CHLORIDE 0.9 % (FLUSH) 0.9 %
10 SYRINGE (ML) INJECTION EVERY 12 HOURS SCHEDULED
Status: DISCONTINUED | OUTPATIENT
Start: 2022-01-01 | End: 2022-01-01 | Stop reason: HOSPADM

## 2022-01-01 RX ORDER — HYDROCODONE BITARTRATE AND ACETAMINOPHEN 10; 325 MG/1; MG/1
1 TABLET ORAL 2 TIMES DAILY
Qty: 6 TABLET | Refills: 0 | Status: SHIPPED | OUTPATIENT
Start: 2022-01-01 | End: 2022-01-01 | Stop reason: SDUPTHER

## 2022-01-01 RX ORDER — NICOTINE 21 MG/24HR
1 PATCH, TRANSDERMAL 24 HOURS TRANSDERMAL
Status: DISCONTINUED | OUTPATIENT
Start: 2022-01-01 | End: 2022-01-01

## 2022-01-01 RX ORDER — DOCUSATE SODIUM 100 MG/1
100 CAPSULE, LIQUID FILLED ORAL 2 TIMES DAILY
Status: DISCONTINUED | OUTPATIENT
Start: 2022-01-01 | End: 2022-01-01 | Stop reason: HOSPADM

## 2022-01-01 RX ORDER — POTASSIUM CHLORIDE 750 MG/1
40 CAPSULE, EXTENDED RELEASE ORAL AS NEEDED
Status: DISCONTINUED | OUTPATIENT
Start: 2022-01-01 | End: 2022-01-01 | Stop reason: HOSPADM

## 2022-01-01 RX ORDER — LORAZEPAM 2 MG/ML
0.5 INJECTION INTRAMUSCULAR ONCE AS NEEDED
Status: DISCONTINUED | OUTPATIENT
Start: 2022-01-01 | End: 2022-01-01

## 2022-01-01 RX ORDER — DIAZEPAM 5 MG/1
5 TABLET ORAL 3 TIMES DAILY PRN
Start: 2022-01-01 | End: 2022-01-01 | Stop reason: HOSPADM

## 2022-01-01 RX ORDER — DEXTROSE MONOHYDRATE 25 G/50ML
25 INJECTION, SOLUTION INTRAVENOUS
Status: DISCONTINUED | OUTPATIENT
Start: 2022-01-01 | End: 2022-01-01 | Stop reason: SDUPTHER

## 2022-01-01 RX ORDER — DIAZEPAM 2 MG/1
2 TABLET ORAL EVERY 8 HOURS PRN
Qty: 90 TABLET | Refills: 2 | Status: SHIPPED | OUTPATIENT
Start: 2022-01-01

## 2022-01-01 RX ORDER — GABAPENTIN 400 MG/1
400 CAPSULE ORAL 3 TIMES DAILY
Status: DISCONTINUED | OUTPATIENT
Start: 2022-01-01 | End: 2022-01-01 | Stop reason: HOSPADM

## 2022-01-01 RX ORDER — NICOTINE POLACRILEX 4 MG
15 LOZENGE BUCCAL
Status: DISCONTINUED | OUTPATIENT
Start: 2022-01-01 | End: 2022-01-01 | Stop reason: SDUPTHER

## 2022-01-01 RX ORDER — DEXTROSE MONOHYDRATE 25 G/50ML
25 INJECTION, SOLUTION INTRAVENOUS
Status: DISCONTINUED | OUTPATIENT
Start: 2022-01-01 | End: 2022-01-01 | Stop reason: HOSPADM

## 2022-01-01 RX ORDER — ONDANSETRON 2 MG/ML
4 INJECTION INTRAMUSCULAR; INTRAVENOUS ONCE AS NEEDED
Status: DISCONTINUED | OUTPATIENT
Start: 2022-01-01 | End: 2022-01-01 | Stop reason: HOSPADM

## 2022-01-01 RX ORDER — ASPIRIN 81 MG/1
81 TABLET, CHEWABLE ORAL DAILY
Start: 2022-01-01

## 2022-01-01 RX ORDER — PROPOFOL 10 MG/ML
VIAL (ML) INTRAVENOUS AS NEEDED
Status: DISCONTINUED | OUTPATIENT
Start: 2022-01-01 | End: 2022-01-01 | Stop reason: SURG

## 2022-01-01 RX ORDER — IPRATROPIUM BROMIDE AND ALBUTEROL SULFATE 2.5; .5 MG/3ML; MG/3ML
3 SOLUTION RESPIRATORY (INHALATION)
Status: DISCONTINUED | OUTPATIENT
Start: 2022-01-01 | End: 2022-01-01 | Stop reason: HOSPADM

## 2022-01-01 RX ORDER — BISACODYL 5 MG/1
10 TABLET, DELAYED RELEASE ORAL DAILY PRN
Start: 2022-01-01 | End: 2022-01-01 | Stop reason: HOSPADM

## 2022-01-01 RX ORDER — HALOPERIDOL 5 MG/ML
1 INJECTION INTRAMUSCULAR ONCE
Status: COMPLETED | OUTPATIENT
Start: 2022-01-01 | End: 2022-01-01

## 2022-01-01 RX ORDER — IPRATROPIUM BROMIDE AND ALBUTEROL SULFATE 2.5; .5 MG/3ML; MG/3ML
3 SOLUTION RESPIRATORY (INHALATION) ONCE AS NEEDED
Status: DISCONTINUED | OUTPATIENT
Start: 2022-01-01 | End: 2022-01-01 | Stop reason: HOSPADM

## 2022-01-01 RX ORDER — HYDROCODONE BITARTRATE AND ACETAMINOPHEN 10; 325 MG/1; MG/1
1 TABLET ORAL 2 TIMES DAILY
Status: DISCONTINUED | OUTPATIENT
Start: 2022-01-01 | End: 2022-01-01 | Stop reason: HOSPADM

## 2022-01-01 RX ORDER — ASPIRIN 81 MG/1
81 TABLET ORAL DAILY
Status: DISCONTINUED | OUTPATIENT
Start: 2022-01-01 | End: 2022-01-01

## 2022-01-01 RX ORDER — POTASSIUM CHLORIDE 750 MG/1
40 CAPSULE, EXTENDED RELEASE ORAL AS NEEDED
Status: DISCONTINUED | OUTPATIENT
Start: 2022-01-01 | End: 2022-01-01

## 2022-01-01 RX ORDER — SODIUM CHLORIDE 0.9 % (FLUSH) 0.9 %
10 SYRINGE (ML) INJECTION AS NEEDED
Status: DISCONTINUED | OUTPATIENT
Start: 2022-01-01 | End: 2022-01-01 | Stop reason: HOSPADM

## 2022-01-01 RX ORDER — SODIUM CHLORIDE, SODIUM LACTATE, POTASSIUM CHLORIDE, CALCIUM CHLORIDE 600; 310; 30; 20 MG/100ML; MG/100ML; MG/100ML; MG/100ML
INJECTION, SOLUTION INTRAVENOUS CONTINUOUS PRN
Status: DISCONTINUED | OUTPATIENT
Start: 2022-01-01 | End: 2022-01-01 | Stop reason: SURG

## 2022-01-01 RX ORDER — NICOTINE 21 MG/24HR
1 PATCH, TRANSDERMAL 24 HOURS TRANSDERMAL ONCE
Status: DISCONTINUED | OUTPATIENT
Start: 2022-01-01 | End: 2022-01-01

## 2022-01-01 RX ORDER — ACETAMINOPHEN 325 MG/1
650 TABLET ORAL EVERY 6 HOURS PRN
COMMUNITY

## 2022-01-01 RX ORDER — FAMOTIDINE 20 MG/1
20 TABLET, FILM COATED ORAL ONCE
Status: COMPLETED | OUTPATIENT
Start: 2022-01-01 | End: 2022-01-01

## 2022-01-01 RX ORDER — DIAZEPAM 5 MG/1
5 TABLET ORAL EVERY 6 HOURS PRN
Status: DISCONTINUED | OUTPATIENT
Start: 2022-01-01 | End: 2022-01-01 | Stop reason: HOSPADM

## 2022-01-01 RX ORDER — NICOTINE 21 MG/24HR
1 PATCH, TRANSDERMAL 24 HOURS TRANSDERMAL
Status: DISCONTINUED | OUTPATIENT
Start: 2022-01-01 | End: 2022-01-01 | Stop reason: HOSPADM

## 2022-01-01 RX ORDER — AMOXICILLIN 250 MG
2 CAPSULE ORAL 2 TIMES DAILY
Start: 2022-01-01 | End: 2022-01-01 | Stop reason: HOSPADM

## 2022-01-01 RX ORDER — POLYETHYLENE GLYCOL 3350 17 G/17G
17 POWDER, FOR SOLUTION ORAL DAILY
Status: DISCONTINUED | OUTPATIENT
Start: 2022-01-01 | End: 2022-01-01 | Stop reason: HOSPADM

## 2022-01-01 RX ORDER — GLIPIZIDE 5 MG/1
5 TABLET ORAL EVERY 12 HOURS
Status: DISCONTINUED | OUTPATIENT
Start: 2022-01-01 | End: 2022-01-01

## 2022-01-01 RX ORDER — CLOPIDOGREL BISULFATE 75 MG/1
75 TABLET ORAL DAILY
Status: DISCONTINUED | OUTPATIENT
Start: 2022-01-01 | End: 2022-01-01 | Stop reason: HOSPADM

## 2022-01-01 RX ORDER — CALCIUM CARBONATE 200(500)MG
2 TABLET,CHEWABLE ORAL DAILY
COMMUNITY

## 2022-01-01 RX ORDER — BISACODYL 10 MG
10 SUPPOSITORY, RECTAL RECTAL DAILY PRN
Start: 2022-01-01 | End: 2022-01-01 | Stop reason: HOSPADM

## 2022-01-01 RX ORDER — CLOPIDOGREL BISULFATE 75 MG/1
75 TABLET ORAL DAILY
Status: DISCONTINUED | OUTPATIENT
Start: 2022-01-01 | End: 2022-01-01

## 2022-01-01 RX ORDER — AMINO ACIDS/PROTEIN HYDROLYS 15G-100/30
1 LIQUID (ML) ORAL DAILY
Status: DISCONTINUED | OUTPATIENT
Start: 2022-01-01 | End: 2022-01-01 | Stop reason: HOSPADM

## 2022-01-01 RX ORDER — PANTOPRAZOLE SODIUM 40 MG/1
40 TABLET, DELAYED RELEASE ORAL
Status: DISCONTINUED | OUTPATIENT
Start: 2022-01-01 | End: 2022-01-01 | Stop reason: HOSPADM

## 2022-01-01 RX ORDER — POLYETHYLENE GLYCOL 3350 17 G/17G
17 POWDER, FOR SOLUTION ORAL DAILY
Start: 2022-01-01 | End: 2022-01-01 | Stop reason: HOSPADM

## 2022-01-01 RX ORDER — FLUCONAZOLE 100 MG/1
100 TABLET ORAL DAILY
Qty: 7 TABLET | Refills: 0 | Status: SHIPPED | OUTPATIENT
Start: 2022-01-01

## 2022-01-01 RX ORDER — SODIUM CHLORIDE 9 MG/ML
75 INJECTION, SOLUTION INTRAVENOUS CONTINUOUS
Status: DISCONTINUED | OUTPATIENT
Start: 2022-01-01 | End: 2022-01-01

## 2022-01-01 RX ORDER — HYDROCODONE BITARTRATE AND ACETAMINOPHEN 5; 325 MG/1; MG/1
1 TABLET ORAL EVERY 6 HOURS PRN
Status: DISCONTINUED | OUTPATIENT
Start: 2022-01-01 | End: 2022-01-01 | Stop reason: HOSPADM

## 2022-01-01 RX ORDER — GABAPENTIN 800 MG/1
800 TABLET ORAL 3 TIMES DAILY
Refills: 0
Start: 2022-01-01 | End: 2022-01-01 | Stop reason: HOSPADM

## 2022-01-01 RX ORDER — FAMOTIDINE 10 MG/ML
20 INJECTION, SOLUTION INTRAVENOUS ONCE
Status: DISCONTINUED | OUTPATIENT
Start: 2022-01-01 | End: 2022-01-01

## 2022-01-01 RX ORDER — PANTOPRAZOLE SODIUM 40 MG/10ML
40 INJECTION, POWDER, LYOPHILIZED, FOR SOLUTION INTRAVENOUS
Status: DISCONTINUED | OUTPATIENT
Start: 2022-01-01 | End: 2022-01-01

## 2022-01-01 RX ORDER — SODIUM CHLORIDE, SODIUM LACTATE, POTASSIUM CHLORIDE, CALCIUM CHLORIDE 600; 310; 30; 20 MG/100ML; MG/100ML; MG/100ML; MG/100ML
75 INJECTION, SOLUTION INTRAVENOUS CONTINUOUS
Status: DISCONTINUED | OUTPATIENT
Start: 2022-01-01 | End: 2022-01-01

## 2022-01-01 RX ORDER — PSEUDOEPHEDRINE HCL 30 MG
100 TABLET ORAL 2 TIMES DAILY
Start: 2022-01-01 | End: 2022-01-01 | Stop reason: HOSPADM

## 2022-01-01 RX ORDER — DIAZEPAM 2 MG/1
2 TABLET ORAL NIGHTLY
Status: DISCONTINUED | OUTPATIENT
Start: 2022-01-01 | End: 2022-01-01 | Stop reason: HOSPADM

## 2022-01-01 RX ORDER — GLIPIZIDE 5 MG/1
5 TABLET ORAL EVERY 12 HOURS
Status: DISCONTINUED | OUTPATIENT
Start: 2022-01-01 | End: 2022-01-01 | Stop reason: HOSPADM

## 2022-01-01 RX ORDER — HYDROCODONE BITARTRATE AND ACETAMINOPHEN 10; 325 MG/1; MG/1
1 TABLET ORAL EVERY 6 HOURS PRN
Qty: 120 TABLET | Refills: 0 | Status: SHIPPED | OUTPATIENT
Start: 2022-01-01

## 2022-01-01 RX ORDER — POTASSIUM CHLORIDE 750 MG/1
40 CAPSULE, EXTENDED RELEASE ORAL AS NEEDED
Status: DISCONTINUED | OUTPATIENT
Start: 2022-01-01 | End: 2022-01-01 | Stop reason: SDUPTHER

## 2022-01-01 RX ORDER — SODIUM CHLORIDE 9 MG/ML
50 INJECTION, SOLUTION INTRAVENOUS CONTINUOUS
Status: DISCONTINUED | OUTPATIENT
Start: 2022-01-01 | End: 2022-01-01

## 2022-01-01 RX ORDER — ACETAMINOPHEN 160 MG/5ML
650 SOLUTION ORAL EVERY 6 HOURS PRN
Status: DISCONTINUED | OUTPATIENT
Start: 2022-01-01 | End: 2022-01-01 | Stop reason: HOSPADM

## 2022-01-01 RX ORDER — PANTOPRAZOLE SODIUM 40 MG/1
40 TABLET, DELAYED RELEASE ORAL
Start: 2022-01-01 | End: 2022-01-01 | Stop reason: HOSPADM

## 2022-01-01 RX ORDER — BISACODYL 5 MG/1
10 TABLET, DELAYED RELEASE ORAL DAILY PRN
Status: DISCONTINUED | OUTPATIENT
Start: 2022-01-01 | End: 2022-01-01 | Stop reason: HOSPADM

## 2022-01-01 RX ORDER — SODIUM CHLORIDE 9 MG/ML
30 INJECTION, SOLUTION INTRAVENOUS CONTINUOUS PRN
Status: DISCONTINUED | OUTPATIENT
Start: 2022-01-01 | End: 2022-01-01

## 2022-01-01 RX ORDER — POTASSIUM CHLORIDE 1.5 G/1.77G
40 POWDER, FOR SOLUTION ORAL AS NEEDED
Status: DISCONTINUED | OUTPATIENT
Start: 2022-01-01 | End: 2022-01-01

## 2022-01-01 RX ORDER — TIZANIDINE 4 MG/1
4 TABLET ORAL 2 TIMES DAILY
COMMUNITY
Start: 2021-01-01 | End: 2022-01-01 | Stop reason: HOSPADM

## 2022-01-01 RX ADMIN — DIAZEPAM 2 MG: 2 TABLET ORAL at 08:32

## 2022-01-01 RX ADMIN — Medication 1 PACKET: at 15:40

## 2022-01-01 RX ADMIN — Medication 1 PACKET: at 09:36

## 2022-01-01 RX ADMIN — INSULIN LISPRO 2 UNITS: 100 INJECTION, SOLUTION INTRAVENOUS; SUBCUTANEOUS at 11:30

## 2022-01-01 RX ADMIN — SODIUM CHLORIDE, PRESERVATIVE FREE 10 ML: 5 INJECTION INTRAVENOUS at 08:39

## 2022-01-01 RX ADMIN — ASPIRIN 81 MG: 81 TABLET, CHEWABLE ORAL at 08:38

## 2022-01-01 RX ADMIN — INSULIN HUMAN 2 UNITS: 100 INJECTION, SOLUTION PARENTERAL at 00:43

## 2022-01-01 RX ADMIN — INSULIN LISPRO 2 UNITS: 100 INJECTION, SOLUTION INTRAVENOUS; SUBCUTANEOUS at 08:54

## 2022-01-01 RX ADMIN — POTASSIUM CHLORIDE 40 MEQ: 1.5 FOR SOLUTION ORAL at 08:52

## 2022-01-01 RX ADMIN — IPRATROPIUM BROMIDE AND ALBUTEROL SULFATE 3 ML: 2.5; .5 SOLUTION RESPIRATORY (INHALATION) at 20:06

## 2022-01-01 RX ADMIN — SODIUM CHLORIDE 75 ML/HR: 9 INJECTION, SOLUTION INTRAVENOUS at 08:21

## 2022-01-01 RX ADMIN — ASPIRIN 325 MG ORAL TABLET 325 MG: 325 PILL ORAL at 08:37

## 2022-01-01 RX ADMIN — SODIUM CHLORIDE 100 ML/HR: 9 INJECTION, SOLUTION INTRAVENOUS at 11:05

## 2022-01-01 RX ADMIN — IPRATROPIUM BROMIDE AND ALBUTEROL SULFATE 3 ML: 2.5; .5 SOLUTION RESPIRATORY (INHALATION) at 07:34

## 2022-01-01 RX ADMIN — GABAPENTIN 400 MG: 400 CAPSULE ORAL at 15:53

## 2022-01-01 RX ADMIN — DIAZEPAM 2 MG: 2 TABLET ORAL at 21:06

## 2022-01-01 RX ADMIN — GABAPENTIN 400 MG: 400 CAPSULE ORAL at 16:00

## 2022-01-01 RX ADMIN — SODIUM CHLORIDE, PRESERVATIVE FREE 10 ML: 5 INJECTION INTRAVENOUS at 00:18

## 2022-01-01 RX ADMIN — INSULIN LISPRO 3 UNITS: 100 INJECTION, SOLUTION INTRAVENOUS; SUBCUTANEOUS at 11:30

## 2022-01-01 RX ADMIN — LIDOCAINE HYDROCHLORIDE 100 MG: 10 INJECTION, SOLUTION EPIDURAL; INFILTRATION; INTRACAUDAL; PERINEURAL at 09:12

## 2022-01-01 RX ADMIN — SODIUM CHLORIDE, PRESERVATIVE FREE 10 ML: 5 INJECTION INTRAVENOUS at 08:00

## 2022-01-01 RX ADMIN — INSULIN LISPRO 3 UNITS: 100 INJECTION, SOLUTION INTRAVENOUS; SUBCUTANEOUS at 17:06

## 2022-01-01 RX ADMIN — HYDROCODONE BITARTRATE AND ACETAMINOPHEN 1 TABLET: 10; 325 TABLET ORAL at 20:38

## 2022-01-01 RX ADMIN — GABAPENTIN 400 MG: 400 CAPSULE ORAL at 21:02

## 2022-01-01 RX ADMIN — INSULIN HUMAN 4 UNITS: 100 INJECTION, SOLUTION PARENTERAL at 17:02

## 2022-01-01 RX ADMIN — INSULIN LISPRO 3 UNITS: 100 INJECTION, SOLUTION INTRAVENOUS; SUBCUTANEOUS at 08:39

## 2022-01-01 RX ADMIN — DIAZEPAM 2 MG: 2 TABLET ORAL at 21:42

## 2022-01-01 RX ADMIN — INSULIN HUMAN 2 UNITS: 100 INJECTION, SOLUTION PARENTERAL at 17:15

## 2022-01-01 RX ADMIN — DIAZEPAM 2 MG: 2 TABLET ORAL at 08:37

## 2022-01-01 RX ADMIN — INSULIN HUMAN 4 UNITS: 100 INJECTION, SOLUTION PARENTERAL at 17:50

## 2022-01-01 RX ADMIN — INSULIN HUMAN 2 UNITS: 100 INJECTION, SOLUTION PARENTERAL at 06:17

## 2022-01-01 RX ADMIN — INSULIN LISPRO 4 UNITS: 100 INJECTION, SOLUTION INTRAVENOUS; SUBCUTANEOUS at 12:48

## 2022-01-01 RX ADMIN — PANTOPRAZOLE SODIUM 40 MG: 40 INJECTION, POWDER, FOR SOLUTION INTRAVENOUS at 06:17

## 2022-01-01 RX ADMIN — SODIUM CHLORIDE, PRESERVATIVE FREE 10 ML: 5 INJECTION INTRAVENOUS at 09:37

## 2022-01-01 RX ADMIN — INSULIN HUMAN 4 UNITS: 100 INJECTION, SOLUTION PARENTERAL at 01:10

## 2022-01-01 RX ADMIN — IPRATROPIUM BROMIDE AND ALBUTEROL SULFATE 3 ML: 2.5; .5 SOLUTION RESPIRATORY (INHALATION) at 21:23

## 2022-01-01 RX ADMIN — SODIUM CHLORIDE, PRESERVATIVE FREE 10 ML: 5 INJECTION INTRAVENOUS at 20:17

## 2022-01-01 RX ADMIN — SODIUM CHLORIDE 1000 ML: 9 INJECTION, SOLUTION INTRAVENOUS at 09:09

## 2022-01-01 RX ADMIN — ASPIRIN 81 MG: 81 TABLET, CHEWABLE ORAL at 09:36

## 2022-01-01 RX ADMIN — INSULIN DETEMIR 15 UNITS: 100 INJECTION, SOLUTION SUBCUTANEOUS at 21:01

## 2022-01-01 RX ADMIN — PROPOFOL 70 MG: 10 INJECTION, EMULSION INTRAVENOUS at 09:12

## 2022-01-01 RX ADMIN — SODIUM CHLORIDE 75 ML/HR: 9 INJECTION, SOLUTION INTRAVENOUS at 19:03

## 2022-01-01 RX ADMIN — CLOPIDOGREL BISULFATE 75 MG: 75 TABLET, FILM COATED ORAL at 08:38

## 2022-01-01 RX ADMIN — GABAPENTIN 400 MG: 400 CAPSULE ORAL at 08:54

## 2022-01-01 RX ADMIN — GLIPIZIDE 5 MG: 5 TABLET ORAL at 08:52

## 2022-01-01 RX ADMIN — APIXABAN 2.5 MG: 2.5 TABLET, FILM COATED ORAL at 09:37

## 2022-01-01 RX ADMIN — IPRATROPIUM BROMIDE AND ALBUTEROL SULFATE 3 ML: 2.5; .5 SOLUTION RESPIRATORY (INHALATION) at 21:46

## 2022-01-01 RX ADMIN — DIAZEPAM 2 MG: 2 TABLET ORAL at 03:03

## 2022-01-01 RX ADMIN — ASPIRIN 325 MG ORAL TABLET 325 MG: 325 PILL ORAL at 10:51

## 2022-01-01 RX ADMIN — IPRATROPIUM BROMIDE AND ALBUTEROL SULFATE 3 ML: 2.5; .5 SOLUTION RESPIRATORY (INHALATION) at 12:29

## 2022-01-01 RX ADMIN — HYDROCODONE BITARTRATE AND ACETAMINOPHEN 1 TABLET: 10; 325 TABLET ORAL at 09:35

## 2022-01-01 RX ADMIN — PANTOPRAZOLE SODIUM 40 MG: 40 INJECTION, POWDER, FOR SOLUTION INTRAVENOUS at 06:10

## 2022-01-01 RX ADMIN — DIAZEPAM 2 MG: 2 TABLET ORAL at 20:38

## 2022-01-01 RX ADMIN — SODIUM CHLORIDE, PRESERVATIVE FREE 10 ML: 5 INJECTION INTRAVENOUS at 21:17

## 2022-01-01 RX ADMIN — INSULIN DETEMIR 20 UNITS: 100 INJECTION, SOLUTION SUBCUTANEOUS at 22:14

## 2022-01-01 RX ADMIN — SODIUM CHLORIDE, PRESERVATIVE FREE 10 ML: 5 INJECTION INTRAVENOUS at 20:38

## 2022-01-01 RX ADMIN — CLOPIDOGREL BISULFATE 75 MG: 75 TABLET, FILM COATED ORAL at 08:33

## 2022-01-01 RX ADMIN — SODIUM CHLORIDE 100 ML/HR: 9 INJECTION, SOLUTION INTRAVENOUS at 03:16

## 2022-01-01 RX ADMIN — PROPOFOL 30 MG: 10 INJECTION, EMULSION INTRAVENOUS at 09:14

## 2022-01-01 RX ADMIN — SODIUM CHLORIDE, POTASSIUM CHLORIDE, SODIUM LACTATE AND CALCIUM CHLORIDE: 600; 310; 30; 20 INJECTION, SOLUTION INTRAVENOUS at 09:08

## 2022-01-01 RX ADMIN — CLOPIDOGREL BISULFATE 75 MG: 75 TABLET, FILM COATED ORAL at 10:09

## 2022-01-01 RX ADMIN — SODIUM CHLORIDE 100 ML/HR: 9 INJECTION, SOLUTION INTRAVENOUS at 21:43

## 2022-01-01 RX ADMIN — SODIUM CHLORIDE, PRESERVATIVE FREE 10 ML: 5 INJECTION INTRAVENOUS at 08:21

## 2022-01-01 RX ADMIN — DIAZEPAM 2 MG: 2 TABLET ORAL at 21:17

## 2022-01-01 RX ADMIN — HYDROCODONE BITARTRATE AND ACETAMINOPHEN 1 TABLET: 10; 325 TABLET ORAL at 21:17

## 2022-01-01 RX ADMIN — SODIUM CHLORIDE 75 ML/HR: 9 INJECTION, SOLUTION INTRAVENOUS at 22:09

## 2022-01-01 RX ADMIN — LISINOPRIL 10 MG: 10 TABLET ORAL at 08:38

## 2022-01-01 RX ADMIN — DIAZEPAM 2 MG: 2 TABLET ORAL at 21:53

## 2022-01-01 RX ADMIN — SODIUM CHLORIDE 75 ML/HR: 9 INJECTION, SOLUTION INTRAVENOUS at 13:09

## 2022-01-01 RX ADMIN — PANTOPRAZOLE SODIUM 40 MG: 40 TABLET, DELAYED RELEASE ORAL at 06:34

## 2022-01-01 RX ADMIN — SODIUM CHLORIDE, PRESERVATIVE FREE 10 ML: 5 INJECTION INTRAVENOUS at 21:58

## 2022-01-01 RX ADMIN — INSULIN HUMAN 2 UNITS: 100 INJECTION, SOLUTION PARENTERAL at 23:45

## 2022-01-01 RX ADMIN — MAGNESIUM SULFATE HEPTAHYDRATE 4 G: 40 INJECTION, SOLUTION INTRAVENOUS at 21:57

## 2022-01-01 RX ADMIN — INSULIN LISPRO 2 UNITS: 100 INJECTION, SOLUTION INTRAVENOUS; SUBCUTANEOUS at 20:01

## 2022-01-01 RX ADMIN — INSULIN HUMAN 3 UNITS: 100 INJECTION, SOLUTION PARENTERAL at 00:41

## 2022-01-01 RX ADMIN — ATORVASTATIN CALCIUM 80 MG: 40 TABLET, FILM COATED ORAL at 21:02

## 2022-01-01 RX ADMIN — HEPARIN SODIUM 12 UNITS/KG/HR: 5000 INJECTION INTRAVENOUS; SUBCUTANEOUS at 10:11

## 2022-01-01 RX ADMIN — ATORVASTATIN CALCIUM 80 MG: 40 TABLET, FILM COATED ORAL at 20:38

## 2022-01-01 RX ADMIN — INSULIN LISPRO 2 UNITS: 100 INJECTION, SOLUTION INTRAVENOUS; SUBCUTANEOUS at 17:27

## 2022-01-01 RX ADMIN — GABAPENTIN 400 MG: 400 CAPSULE ORAL at 08:38

## 2022-01-01 RX ADMIN — HYDROCODONE BITARTRATE AND ACETAMINOPHEN 1 TABLET: 10; 325 TABLET ORAL at 08:09

## 2022-01-01 RX ADMIN — ASPIRIN 81 MG CHEWABLE TABLET 81 MG: 81 TABLET CHEWABLE at 09:19

## 2022-01-01 RX ADMIN — INSULIN HUMAN 3 UNITS: 100 INJECTION, SOLUTION PARENTERAL at 11:55

## 2022-01-01 RX ADMIN — SODIUM CHLORIDE, PRESERVATIVE FREE 10 ML: 5 INJECTION INTRAVENOUS at 21:53

## 2022-01-01 RX ADMIN — SODIUM CHLORIDE 100 ML/HR: 9 INJECTION, SOLUTION INTRAVENOUS at 16:38

## 2022-01-01 RX ADMIN — IPRATROPIUM BROMIDE AND ALBUTEROL SULFATE 3 ML: 2.5; .5 SOLUTION RESPIRATORY (INHALATION) at 16:38

## 2022-01-01 RX ADMIN — ASPIRIN 81 MG: 81 TABLET, CHEWABLE ORAL at 08:54

## 2022-01-01 RX ADMIN — ATORVASTATIN CALCIUM 80 MG: 40 TABLET, FILM COATED ORAL at 21:01

## 2022-01-01 RX ADMIN — PANTOPRAZOLE SODIUM 40 MG: 40 INJECTION, POWDER, FOR SOLUTION INTRAVENOUS at 05:01

## 2022-01-01 RX ADMIN — GABAPENTIN 400 MG: 400 CAPSULE ORAL at 17:36

## 2022-01-01 RX ADMIN — PROPOFOL 20 MG: 10 INJECTION, EMULSION INTRAVENOUS at 09:25

## 2022-01-01 RX ADMIN — INSULIN DETEMIR 20 UNITS: 100 INJECTION, SOLUTION SUBCUTANEOUS at 20:20

## 2022-01-01 RX ADMIN — HYDROCODONE BITARTRATE AND ACETAMINOPHEN 1 TABLET: 10; 325 TABLET ORAL at 21:24

## 2022-01-01 RX ADMIN — IOPAMIDOL 125 ML: 755 INJECTION, SOLUTION INTRAVENOUS at 08:35

## 2022-01-01 RX ADMIN — INSULIN LISPRO 5 UNITS: 100 INJECTION, SOLUTION INTRAVENOUS; SUBCUTANEOUS at 17:37

## 2022-01-01 RX ADMIN — IPRATROPIUM BROMIDE AND ALBUTEROL SULFATE 3 ML: 2.5; .5 SOLUTION RESPIRATORY (INHALATION) at 20:28

## 2022-01-01 RX ADMIN — PANTOPRAZOLE SODIUM 40 MG: 40 INJECTION, POWDER, FOR SOLUTION INTRAVENOUS at 05:32

## 2022-01-01 RX ADMIN — GLIPIZIDE 5 MG: 5 TABLET ORAL at 09:37

## 2022-01-01 RX ADMIN — HYDROCODONE BITARTRATE AND ACETAMINOPHEN 1 TABLET: 10; 325 TABLET ORAL at 21:42

## 2022-01-01 RX ADMIN — INSULIN HUMAN 4 UNITS: 100 INJECTION, SOLUTION PARENTERAL at 11:50

## 2022-01-01 RX ADMIN — SODIUM CHLORIDE, PRESERVATIVE FREE 10 ML: 5 INJECTION INTRAVENOUS at 21:43

## 2022-01-01 RX ADMIN — BISACODYL SUPPOSITORY 10 MG: 10 SUPPOSITORY RECTAL at 13:02

## 2022-01-01 RX ADMIN — ACETAMINOPHEN ORAL SOLUTION 650 MG: 650 SOLUTION ORAL at 11:55

## 2022-01-01 RX ADMIN — DIAZEPAM 2 MG: 2 TABLET ORAL at 09:35

## 2022-01-01 RX ADMIN — NICOTINE 1 PATCH: 14 PATCH, EXTENDED RELEASE TRANSDERMAL at 10:02

## 2022-01-01 RX ADMIN — IPRATROPIUM BROMIDE AND ALBUTEROL SULFATE 3 ML: 2.5; .5 SOLUTION RESPIRATORY (INHALATION) at 15:48

## 2022-01-01 RX ADMIN — HYDROCODONE BITARTRATE AND ACETAMINOPHEN 1 TABLET: 10; 325 TABLET ORAL at 08:00

## 2022-01-01 RX ADMIN — ATORVASTATIN CALCIUM 80 MG: 40 TABLET, FILM COATED ORAL at 21:42

## 2022-01-01 RX ADMIN — ATORVASTATIN CALCIUM 80 MG: 40 TABLET, FILM COATED ORAL at 21:24

## 2022-01-01 RX ADMIN — IPRATROPIUM BROMIDE AND ALBUTEROL SULFATE 3 ML: 2.5; .5 SOLUTION RESPIRATORY (INHALATION) at 21:55

## 2022-01-01 RX ADMIN — CEFDINIR 300 MG: 300 CAPSULE ORAL at 09:41

## 2022-01-01 RX ADMIN — POTASSIUM CHLORIDE 40 MEQ: 1.5 FOR SOLUTION ORAL at 17:56

## 2022-01-01 RX ADMIN — IPRATROPIUM BROMIDE AND ALBUTEROL SULFATE 3 ML: 2.5; .5 SOLUTION RESPIRATORY (INHALATION) at 16:24

## 2022-01-01 RX ADMIN — ATORVASTATIN CALCIUM 80 MG: 40 TABLET, FILM COATED ORAL at 21:16

## 2022-01-01 RX ADMIN — HEPARIN SODIUM 12 UNITS/KG/HR: 5000 INJECTION INTRAVENOUS; SUBCUTANEOUS at 21:35

## 2022-01-01 RX ADMIN — SODIUM CHLORIDE, PRESERVATIVE FREE 10 ML: 5 INJECTION INTRAVENOUS at 09:19

## 2022-01-01 RX ADMIN — SODIUM CHLORIDE, PRESERVATIVE FREE 10 ML: 5 INJECTION INTRAVENOUS at 10:54

## 2022-01-01 RX ADMIN — ASPIRIN 81 MG: 81 TABLET, CHEWABLE ORAL at 08:33

## 2022-01-01 RX ADMIN — GABAPENTIN 400 MG: 400 CAPSULE ORAL at 21:57

## 2022-01-01 RX ADMIN — CLOPIDOGREL BISULFATE 75 MG: 75 TABLET ORAL at 08:32

## 2022-01-01 RX ADMIN — PROPOFOL 30 MG: 10 INJECTION, EMULSION INTRAVENOUS at 09:20

## 2022-01-01 RX ADMIN — SODIUM CHLORIDE, PRESERVATIVE FREE 10 ML: 5 INJECTION INTRAVENOUS at 08:37

## 2022-01-01 RX ADMIN — CLOPIDOGREL BISULFATE 75 MG: 75 TABLET, FILM COATED ORAL at 10:03

## 2022-01-01 RX ADMIN — DIAZEPAM 2 MG: 2 TABLET ORAL at 08:09

## 2022-01-01 RX ADMIN — PROPOFOL 30 MG: 10 INJECTION, EMULSION INTRAVENOUS at 09:23

## 2022-01-01 RX ADMIN — ATORVASTATIN CALCIUM 80 MG: 40 TABLET, FILM COATED ORAL at 03:03

## 2022-01-01 RX ADMIN — HYDROCODONE BITARTRATE AND ACETAMINOPHEN 1 TABLET: 10; 325 TABLET ORAL at 03:03

## 2022-01-01 RX ADMIN — SODIUM CHLORIDE, PRESERVATIVE FREE 10 ML: 5 INJECTION INTRAVENOUS at 21:06

## 2022-01-01 RX ADMIN — INSULIN LISPRO 2 UNITS: 100 INJECTION, SOLUTION INTRAVENOUS; SUBCUTANEOUS at 08:39

## 2022-01-01 RX ADMIN — GLIPIZIDE 5 MG: 5 TABLET ORAL at 21:06

## 2022-01-01 RX ADMIN — INSULIN LISPRO 3 UNITS: 100 INJECTION, SOLUTION INTRAVENOUS; SUBCUTANEOUS at 08:34

## 2022-01-01 RX ADMIN — INSULIN LISPRO 2 UNITS: 100 INJECTION, SOLUTION INTRAVENOUS; SUBCUTANEOUS at 13:03

## 2022-01-01 RX ADMIN — ASPIRIN 325 MG ORAL TABLET 325 MG: 325 PILL ORAL at 09:35

## 2022-01-01 RX ADMIN — SODIUM CHLORIDE, PRESERVATIVE FREE 10 ML: 5 INJECTION INTRAVENOUS at 10:09

## 2022-01-01 RX ADMIN — APIXABAN 5 MG: 5 TABLET, FILM COATED ORAL at 10:58

## 2022-01-01 RX ADMIN — APIXABAN 2.5 MG: 2.5 TABLET, FILM COATED ORAL at 08:00

## 2022-01-01 RX ADMIN — GABAPENTIN 400 MG: 400 CAPSULE ORAL at 10:03

## 2022-01-01 RX ADMIN — PANTOPRAZOLE SODIUM 40 MG: 40 TABLET, DELAYED RELEASE ORAL at 05:33

## 2022-01-01 RX ADMIN — IPRATROPIUM BROMIDE AND ALBUTEROL SULFATE 3 ML: 2.5; .5 SOLUTION RESPIRATORY (INHALATION) at 16:19

## 2022-01-01 RX ADMIN — Medication 1 PACKET: at 08:37

## 2022-01-01 RX ADMIN — ASPIRIN 81 MG: 81 TABLET, CHEWABLE ORAL at 08:00

## 2022-01-01 RX ADMIN — INSULIN HUMAN 2 UNITS: 100 INJECTION, SOLUTION PARENTERAL at 00:40

## 2022-01-01 RX ADMIN — ATORVASTATIN CALCIUM 80 MG: 40 TABLET, FILM COATED ORAL at 21:57

## 2022-01-01 RX ADMIN — INSULIN LISPRO 2 UNITS: 100 INJECTION, SOLUTION INTRAVENOUS; SUBCUTANEOUS at 12:38

## 2022-01-01 RX ADMIN — CLOPIDOGREL BISULFATE 75 MG: 75 TABLET ORAL at 03:03

## 2022-01-01 RX ADMIN — SODIUM CHLORIDE, PRESERVATIVE FREE 10 ML: 5 INJECTION INTRAVENOUS at 23:03

## 2022-01-01 RX ADMIN — GABAPENTIN 400 MG: 400 CAPSULE ORAL at 20:17

## 2022-01-01 RX ADMIN — Medication 1 PACKET: at 08:00

## 2022-01-01 RX ADMIN — HYDROCODONE BITARTRATE AND ACETAMINOPHEN 1 TABLET: 10; 325 TABLET ORAL at 20:02

## 2022-01-01 RX ADMIN — HYDROCODONE BITARTRATE AND ACETAMINOPHEN 1 TABLET: 10; 325 TABLET ORAL at 08:32

## 2022-01-01 RX ADMIN — LISINOPRIL 10 MG: 10 TABLET ORAL at 12:47

## 2022-01-01 RX ADMIN — NICOTINE 1 PATCH: 14 PATCH, EXTENDED RELEASE TRANSDERMAL at 08:38

## 2022-01-01 RX ADMIN — Medication 10 MG: at 08:33

## 2022-01-01 RX ADMIN — ATORVASTATIN CALCIUM 80 MG: 40 TABLET, FILM COATED ORAL at 20:02

## 2022-01-01 RX ADMIN — INSULIN DETEMIR 15 UNITS: 100 INJECTION, SOLUTION SUBCUTANEOUS at 21:00

## 2022-01-01 RX ADMIN — IPRATROPIUM BROMIDE AND ALBUTEROL SULFATE 3 ML: 2.5; .5 SOLUTION RESPIRATORY (INHALATION) at 13:01

## 2022-01-01 RX ADMIN — SODIUM CHLORIDE, PRESERVATIVE FREE 10 ML: 5 INJECTION INTRAVENOUS at 22:09

## 2022-01-01 RX ADMIN — ATORVASTATIN CALCIUM 80 MG: 40 TABLET, FILM COATED ORAL at 21:53

## 2022-01-01 RX ADMIN — SODIUM CHLORIDE 500 ML: 9 INJECTION, SOLUTION INTRAVENOUS at 02:06

## 2022-01-01 RX ADMIN — IPRATROPIUM BROMIDE AND ALBUTEROL SULFATE 3 ML: 2.5; .5 SOLUTION RESPIRATORY (INHALATION) at 13:10

## 2022-01-01 RX ADMIN — INSULIN HUMAN 2 UNITS: 100 INJECTION, SOLUTION PARENTERAL at 05:14

## 2022-01-01 RX ADMIN — INSULIN LISPRO 3 UNITS: 100 INJECTION, SOLUTION INTRAVENOUS; SUBCUTANEOUS at 08:33

## 2022-01-01 RX ADMIN — POLYETHYLENE GLYCOL 3350 17 G: 17 POWDER, FOR SOLUTION ORAL at 08:33

## 2022-01-01 RX ADMIN — DIAZEPAM 2 MG: 2 TABLET ORAL at 21:24

## 2022-01-01 RX ADMIN — APIXABAN 2.5 MG: 2.5 TABLET, FILM COATED ORAL at 21:06

## 2022-01-01 RX ADMIN — INSULIN HUMAN 2 UNITS: 100 INJECTION, SOLUTION PARENTERAL at 06:11

## 2022-01-01 RX ADMIN — HYDROCODONE BITARTRATE AND ACETAMINOPHEN 1 TABLET: 10; 325 TABLET ORAL at 10:51

## 2022-01-01 RX ADMIN — ATORVASTATIN CALCIUM 80 MG: 40 TABLET, FILM COATED ORAL at 20:17

## 2022-01-01 RX ADMIN — IPRATROPIUM BROMIDE AND ALBUTEROL SULFATE 3 ML: 2.5; .5 SOLUTION RESPIRATORY (INHALATION) at 07:26

## 2022-01-01 RX ADMIN — NICOTINE 1 PATCH: 14 PATCH, EXTENDED RELEASE TRANSDERMAL at 22:19

## 2022-01-01 RX ADMIN — DIAZEPAM 2 MG: 2 TABLET ORAL at 20:02

## 2022-01-01 RX ADMIN — GABAPENTIN 400 MG: 400 CAPSULE ORAL at 08:33

## 2022-01-01 RX ADMIN — ASPIRIN 81 MG: 81 TABLET, CHEWABLE ORAL at 10:03

## 2022-01-01 RX ADMIN — LISINOPRIL 10 MG: 10 TABLET ORAL at 08:33

## 2022-01-01 RX ADMIN — GABAPENTIN 400 MG: 400 CAPSULE ORAL at 10:08

## 2022-01-01 RX ADMIN — IOPAMIDOL 125 ML: 755 INJECTION, SOLUTION INTRAVENOUS at 20:23

## 2022-01-01 RX ADMIN — PANTOPRAZOLE SODIUM 40 MG: 40 INJECTION, POWDER, FOR SOLUTION INTRAVENOUS at 06:23

## 2022-01-01 RX ADMIN — GABAPENTIN 400 MG: 400 CAPSULE ORAL at 16:36

## 2022-01-01 RX ADMIN — NICOTINE 1 PATCH: 14 PATCH, EXTENDED RELEASE TRANSDERMAL at 08:55

## 2022-01-01 RX ADMIN — LISINOPRIL 10 MG: 10 TABLET ORAL at 08:54

## 2022-01-01 RX ADMIN — PROPOFOL 20 MG: 10 INJECTION, EMULSION INTRAVENOUS at 09:16

## 2022-01-01 RX ADMIN — POTASSIUM CHLORIDE 40 MEQ: 1.5 FOR SOLUTION ORAL at 21:57

## 2022-01-01 RX ADMIN — CLOPIDOGREL BISULFATE 300 MG: 75 TABLET ORAL at 21:42

## 2022-01-01 RX ADMIN — IPRATROPIUM BROMIDE AND ALBUTEROL SULFATE 3 ML: 2.5; .5 SOLUTION RESPIRATORY (INHALATION) at 08:09

## 2022-01-01 RX ADMIN — IPRATROPIUM BROMIDE AND ALBUTEROL SULFATE 3 ML: 2.5; .5 SOLUTION RESPIRATORY (INHALATION) at 07:36

## 2022-01-01 RX ADMIN — SODIUM CHLORIDE, PRESERVATIVE FREE 10 ML: 5 INJECTION INTRAVENOUS at 10:52

## 2022-01-01 RX ADMIN — DIAZEPAM 2 MG: 2 TABLET ORAL at 10:51

## 2022-01-01 RX ADMIN — POTASSIUM CHLORIDE 40 MEQ: 1.5 FOR SOLUTION ORAL at 13:02

## 2022-01-01 RX ADMIN — SODIUM CHLORIDE, PRESERVATIVE FREE 10 ML: 5 INJECTION INTRAVENOUS at 21:24

## 2022-01-01 RX ADMIN — ASPIRIN 325 MG ORAL TABLET 325 MG: 325 PILL ORAL at 08:09

## 2022-01-01 RX ADMIN — SODIUM CHLORIDE, PRESERVATIVE FREE 10 ML: 5 INJECTION INTRAVENOUS at 21:02

## 2022-01-01 RX ADMIN — IPRATROPIUM BROMIDE AND ALBUTEROL SULFATE 3 ML: 2.5; .5 SOLUTION RESPIRATORY (INHALATION) at 07:58

## 2022-01-01 RX ADMIN — INSULIN LISPRO 3 UNITS: 100 INJECTION, SOLUTION INTRAVENOUS; SUBCUTANEOUS at 17:27

## 2022-01-01 RX ADMIN — SODIUM CHLORIDE, PRESERVATIVE FREE 10 ML: 5 INJECTION INTRAVENOUS at 21:09

## 2022-01-01 RX ADMIN — POTASSIUM CHLORIDE 40 MEQ: 1.5 FOR SOLUTION ORAL at 21:17

## 2022-01-01 RX ADMIN — SODIUM CHLORIDE, PRESERVATIVE FREE 10 ML: 5 INJECTION INTRAVENOUS at 08:55

## 2022-01-01 RX ADMIN — ASPIRIN 81 MG: 81 TABLET, CHEWABLE ORAL at 21:42

## 2022-01-01 RX ADMIN — ASPIRIN 81 MG: 81 TABLET, CHEWABLE ORAL at 08:32

## 2022-01-01 RX ADMIN — HALOPERIDOL LACTATE 1 MG: 5 INJECTION, SOLUTION INTRAMUSCULAR at 17:56

## 2022-01-01 RX ADMIN — PANTOPRAZOLE SODIUM 40 MG: 40 TABLET, DELAYED RELEASE ORAL at 05:16

## 2022-01-01 RX ADMIN — SODIUM CHLORIDE, PRESERVATIVE FREE 10 ML: 5 INJECTION INTRAVENOUS at 20:02

## 2022-01-01 RX ADMIN — INSULIN HUMAN 2 UNITS: 100 INJECTION, SOLUTION PARENTERAL at 06:22

## 2022-01-01 RX ADMIN — HYDROCODONE BITARTRATE AND ACETAMINOPHEN 1 TABLET: 10; 325 TABLET ORAL at 21:06

## 2022-01-01 RX ADMIN — POTASSIUM CHLORIDE 40 MEQ: 1.5 FOR SOLUTION ORAL at 11:55

## 2022-01-01 RX ADMIN — FAMOTIDINE 20 MG: 20 TABLET, FILM COATED ORAL at 10:51

## 2022-01-01 RX ADMIN — ASPIRIN 81 MG: 81 TABLET, COATED ORAL at 10:58

## 2022-01-01 RX ADMIN — PANTOPRAZOLE SODIUM 40 MG: 40 INJECTION, POWDER, FOR SOLUTION INTRAVENOUS at 06:08

## 2022-01-01 RX ADMIN — Medication 1 PATCH: at 23:07

## 2022-01-01 RX ADMIN — ASPIRIN 300 MG: 300 SUPPOSITORY RECTAL at 23:52

## 2022-01-01 RX ADMIN — SODIUM CHLORIDE, PRESERVATIVE FREE 10 ML: 5 INJECTION INTRAVENOUS at 08:33

## 2022-01-01 RX ADMIN — HYDROCODONE BITARTRATE AND ACETAMINOPHEN 1 TABLET: 10; 325 TABLET ORAL at 09:36

## 2022-01-01 RX ADMIN — HYDROCODONE BITARTRATE AND ACETAMINOPHEN 1 TABLET: 10; 325 TABLET ORAL at 08:37

## 2022-01-01 RX ADMIN — ATORVASTATIN CALCIUM 80 MG: 40 TABLET, FILM COATED ORAL at 21:06

## 2022-01-01 RX ADMIN — CLOPIDOGREL BISULFATE 75 MG: 75 TABLET, FILM COATED ORAL at 08:54

## 2022-01-01 RX ADMIN — INSULIN LISPRO 3 UNITS: 100 INJECTION, SOLUTION INTRAVENOUS; SUBCUTANEOUS at 17:07

## 2022-01-01 RX ADMIN — GABAPENTIN 400 MG: 400 CAPSULE ORAL at 21:01

## 2022-01-10 PROBLEM — Z87.898 HISTORY OF CHEST PAIN: Status: RESOLVED | Noted: 2022-01-01 | Resolved: 2022-01-01

## 2022-01-10 PROBLEM — R00.2 PALPITATIONS: Status: ACTIVE | Noted: 2022-01-01

## 2022-01-10 PROBLEM — Z87.898 HISTORY OF CHEST PAIN: Status: ACTIVE | Noted: 2022-01-01

## 2022-01-10 PROBLEM — R07.89 OTHER CHEST PAIN: Status: ACTIVE | Noted: 2022-01-01

## 2022-01-10 PROBLEM — M47.816 DEGENERATIVE JOINT DISEASE (DJD) OF LUMBAR SPINE: Status: ACTIVE | Noted: 2022-01-01

## 2022-01-10 NOTE — PROGRESS NOTES
"OFFICE VISIT  NOTE  [facility] CARDIOLOGY      Name: Raquel Espitia    Date: 2022  MRN:  [id number]  :  1965      REFERRING/PRIMARY PROVIDER:  Vikram Stewart MD     Chief Complaint   Patient presents with   • Fatigue     HPI: Raquel Espitia is a 56 y.o. female who presents today for follow up after recent Heart and Valve center evaluation. She was seen in our ER for a fall in 2021 and complained of right hip and chest pain at that time following her fall. She was evaluated by Indiana Reagan in H&V center and stress MPS and 30 day monitor was obtained. Stress MPS was low risk with no ischemia, normal  LVEF. Cardiac monitor is normal with no arrhythmias. She has a history of left hemispheric CVA with left carotid stent placement in 2020 and right CEA a few months later by Dr. Patrick. She denies recurrent focal neurological deficits. She does report \"weak spells\" which have been present ever since her stroke, however these appear to happen more frequently lately. She uses a wheelchair when she travels long distances and a cane occasionally at home. She has had \"spells\" where she feels generally weak, and these are not positional in nature, can happen while sitting or standing, and can last up to 30 minutes. She continues to smoke about 2PPD and is trying to quit smoking. Does not drink enough water. Her chest pain is infrequent and palpitations occur occasionally. She denies syncope or near-syncope. We also note her diabetes is not well controlled, and she has a follow up later this month with her PCP.     ROS:Pertinent positives as listed in the HPI.  All other systems reviewed and negative.    Past Medical History:   Diagnosis Date   • Acute bronchitis    • Arthritis    • Asthma    • COPD (chronic obstructive pulmonary disease) (HCC)    • Diabetes mellitus (HCC)     TWICE PER WEEK CHECKS SUGAR    • Edema    • Fibromyalgia    • GERD (gastroesophageal " reflux disease)    • Headache    • History of Holter monitoring     23 days worn   • Hyperlipidemia    • Hypertension    • Impaired fasting glucose    • Low back pain    • Migraine    • Pain of left calf    • Shingles outbreak     8-9  YEARS AGO- THINK HAD IT    • Stroke (HCC)     X2 TIA -  BOTH SIDES EFFECTED    • Wears dentures     FULL BUT DOESNT WEAR ALWAYS    • Wears glasses        Past Surgical History:   Procedure Laterality Date   • ABDOMINAL SURGERY      X2 ULCER REPAIR    • BACK SURGERY      X2 fusion lumbar   • CAROTID ENDARTERECTOMY Right 2020    Procedure: CAROTID ENDARTERECTOMY WITH EEG RIGHT;  Surgeon: Gregg Patrick MD;  Location:  [facility] OR;  Service: Neurosurgery;  Laterality: Right;   •  SECTION     • CHOLECYSTECTOMY     • COLONOSCOPY     • ENDOSCOPY     • HAND SURGERY      RIGHT nerve repair   • INTERVENTIONAL RADIOLOGY PROCEDURE Bilateral 2020    Procedure: CAROTID CEREBRAL ANGIOGRAM BILATERAL;  Surgeon: Gregg Patrick MD;  Location:  [facility] CATH INVASIVE LOCATION;  Service: Interventional Radiology;  Laterality: Bilateral;   • NECK SURGERY      cervicle spine plate        Social History     Socioeconomic History   • Marital status: Legally    Tobacco Use   • Smoking status: Current Every Day Smoker     Packs/day: 2.00     Years: 40.00     Pack years: 80.00     Types: Cigarettes   • Smokeless tobacco: Former User     Types: Chew   • Tobacco comment: Patient states that she is down to 1 pack a month.    Vaping Use   • Vaping Use: Never used   Substance and Sexual Activity   • Alcohol use: No   • Drug use: No   • Sexual activity: Defer       Family History   Problem Relation Age of Onset   • Hypertension Mother    • Stroke Mother    • Cancer Father    • Stroke Father    • No Known Problems Sister    • Diabetes Maternal Grandmother    • Heart disease Maternal Grandmother    • Valvular heart disease Maternal Grandmother    • Cancer Maternal Grandfather    •  "Cancer Paternal Grandmother    • Brain cancer Paternal Grandmother    • Cancer Paternal Grandfather         No Known Allergies    Current Outpatient Medications   Medication Instructions   • albuterol sulfate  (90 Base) MCG/ACT inhaler 2 puffs, Inhalation, Every 4 Hours PRN   • aspirin 81 mg, Oral, Daily, CONTINUE   • atorvastatin (LIPITOR) 80 mg, Oral, Nightly   • baclofen (LIORESAL) 20 mg, Oral, 3 Times Daily   • clopidogrel (PLAVIX) 75 MG tablet TAKE 1 TABLET BY MOUTH DAILY. CONTINUE   • diazePAM (VALIUM) 10 MG tablet 1 tablet 2-3 times daily as needed for anxiety   • empagliflozin (JARDIANCE) 25 mg, Oral, Daily   • FREESTYLE LITE test strip USE AS DIRECTED TO TEST BLOOD GLUCOSE EVERY DAY   • gabapentin (NEURONTIN) 400 mg, Oral, 3 Times Daily   • HYDROcodone-acetaminophen (NORCO)  MG per tablet 1 tablet, Oral, 4 Times Daily PRN   • metFORMIN (GLUCOPHAGE) 1,000 mg, Oral, 2 Times Daily With Meals   • metoprolol tartrate (LOPRESSOR) 12.5 mg, Oral, 2 Times Daily   • nicotine (Nicotine Step 1) 21 MG/24HR patch 1 patch, Transdermal, Every 24 Hours   • nortriptyline (PAMELOR) 25 mg, Oral, Nightly   • omeprazole (PRILOSEC) 40 mg, Oral, Daily   • SITagliptin (JANUVIA) 25 mg, Oral, Daily   • tiZANidine (ZANAFLEX) 4 mg, Oral, 2 Times Daily   • triamcinolone (KENALOG) 0.5 % cream Topical, 3 Times Daily   • varenicline (CHANTIX CONTINUING MONTH SHANDA) 1 mg, Oral, 2 Times Daily       Vitals:    01/13/22 0856   BP: 124/78   BP Location: Right arm   Patient Position: Sitting   Pulse: 86   SpO2: 97%   Weight: 73 kg (161 lb)   Height: 157.5 cm (62\")     Body mass index is 29.45 kg/m².    PHYSICAL EXAM:    General Appearance:   · well developed  · well nourished  Neck:  · thyroid not enlarged  · supple  Respiratory:  · no respiratory distress  · normal breath sounds  · no rales  Cardiovascular:  · no jugular venous distention  · regular rhythm  · apical impulse normal  · S1 normal, S2 normal  · no S3, no S4   · no " murmur  · no rub, no thrill  · lower extremity edema: none    Skin:   warm, dry    RESULTS:   Procedures    Results for orders placed during the hospital encounter of 11/23/20    Adult Transthoracic Echo Complete W/ Cont if Necessary Per Protocol (With Agitated Saline)    Interpretation Summary  · Left ventricular ejection fraction appears to be 56 - 60%. Left ventricular systolic function is normal.  · Left ventricular diastolic function is consistent with (grade Ia w/high LAP) impaired relaxation.  · Left ventricular wall thickness is consistent with hypertrophy. Sigmoid-shaped ventricular septum is present.  · Saline test results are positive with valsalva manuever.  · No significant structural or functional valvular disease.      Labs:  Lab Results   Component Value Date    CHOL 209 (H) 11/24/2020    TRIG 343 (H) 11/24/2020    HDL 36 (L) 11/24/2020     (H) 11/24/2020    AST 18 11/19/2021    ALT 16 11/19/2021     Lab Results   Component Value Date    HGBA1C 9.2 06/15/2021     Creatinine   Date Value Ref Range Status   11/19/2021 0.87 0.57 - 1.00 mg/dL Final   04/22/2021 0.80 0.60 - 1.30 mg/dL Final     Comment:     Serial Number: [id number]:  [id number]   12/31/2020 0.50 (L) 0.57 - 1.00 mg/dL Final   12/29/2020 0.69 0.57 - 1.00 mg/dL Final   11/23/2020 0.60 0.60 - 1.30 mg/dL Final     Comment:     Serial Number: [id number]:  [id number]     eGFR Non  Amer   Date Value Ref Range Status   11/19/2021 67 >60 mL/min/1.73 Final   12/31/2020 128 >60 mL/min/1.73 Final   12/29/2020 88 >60 mL/min/1.73 Final         ASSESSMENT:  Problem List Items Addressed This Visit        Cardiac and Vasculature    HTN (Chronic)    Hyperlipidemia LDL goal <70    History of chest pain - Primary    Overview       · 3/13/2019 MPS: Normal with no evidence of ischemia, EF >70%, low risk study  · 3/13/2019 echo: EF 56 to 60%, no significant valvular disease  · Stress MPS 12/2021: low risk study with no ischemia, no coronary  calcium on CT portion, EF >70%             Palpitations    Overview     · Event monitor December 2021: normal             Tobacco    Chronic tobacco abuse (1.5 PPD x 40 yrs) (Chronic)      Other Visit Diagnoses     History of CVA (cerebrovascular accident)        Relevant Orders    Ambulatory Referral to Neurology    Weakness generalized        Relevant Orders    Ambulatory Referral to Neurology    Carotid stenosis, bilateral        Relevant Orders    Ambulatory Referral to Neurology          PLAN:    1. Chest pain, likely noncardiac   Stress MPS 12/2021 negative for ischemia, normal LVEF  Continue risk factor management, needs better glucose control and she will follow up with PCP in this regard  Continue ASA, statin   Smoking cessation encouraged     2.  Palpitations  Cardiac event monitor reviewed and is normal - discussed with patient   She denies LOC or near-syncope    3.  Hypertension  BP at goal of <130/80mmHg  Continue current medical regimen    4. Hyperlipidemia  Goal LDL <70  On Lipitor 80mg, no recent lipid panel on file   Will order lipid panel and can be done when she sees her PCP later this month     5. Tobacco abuse   Smoking cessation counseling provided    6. Generalized weakness  Intermittent episodes, sitting or standing described as generalized body fatigue/weakness; no syncope  Will refer back to Neurology for further evaluation    7. Carotid stenosis  Followed by Dr. Patrick  She will contact their office for follow up and carotid duplex    Follow-up   Return in about 6 months (around 7/13/2022) for with RDS.      Electronically signed by Simi Scales PA-C, 01/13/22, 9:40 AM EST.

## 2022-01-20 PROBLEM — I63.9 ISCHEMIC CEREBROVASCULAR ACCIDENT (CVA) (HCC): Status: ACTIVE | Noted: 2022-01-01

## 2022-01-20 PROBLEM — I65.23 CAROTID OCCLUSION, BILATERAL: Status: ACTIVE | Noted: 2022-01-01

## 2022-01-21 PROBLEM — I63.9 ACUTE ISCHEMIC STROKE (HCC): Status: ACTIVE | Noted: 2022-01-01

## 2022-01-26 PROBLEM — I63.9 ACUTE CVA (CEREBROVASCULAR ACCIDENT) (HCC): Status: ACTIVE | Noted: 2022-01-01

## 2022-01-30 PROBLEM — R13.10 DYSPHAGIA: Status: ACTIVE | Noted: 2022-01-01

## 2022-01-31 NOTE — ANESTHESIA PREPROCEDURE EVALUATION
Anesthesia Evaluation                  Airway   Mallampati: I  TM distance: >3 FB  Neck ROM: full  No difficulty expected  Dental      Pulmonary    (+) asthma,  Cardiovascular     ECG reviewed    (+) hypertension, hyperlipidemia,  carotid artery disease      Neuro/Psych  (+) CVA, headaches, psychiatric history,     GI/Hepatic/Renal/Endo    (+)  GERD,  diabetes mellitus, thyroid problem     Musculoskeletal     Abdominal    Substance History      OB/GYN          Other   arthritis,                      Anesthesia Plan    ASA 3     general     intravenous induction     Anesthetic plan, all risks, benefits, and alternatives have been provided, discussed and informed consent has been obtained with: patient.    Plan discussed with CRNA.        CODE STATUS:    Medical Intervention Limits: NO intubation (DNI)  Level Of Support Discussed With: Patient; Next of Kin (If No Surrogate)  Code Status (Patient has no pulse and is not breathing): No CPR (Do Not Attempt to Resuscitate)  Medical Interventions (Patient has pulse or is breathing): Limited Support  Comments: disucssed with spouse Faheem Espitia

## 2022-01-31 NOTE — ANESTHESIA POSTPROCEDURE EVALUATION
Patient: Raquel Espitia    Procedure Summary     Date: 01/31/22 Room / Location:  [facility] /  [facility]    Anesthesia Start: 0908 Anesthesia Stop: 0932    Procedure: ESOPHAGOGASTRODUODENOSCOPY WITH PERCUTANEOUS ENDOSCOPIC GASTROSTOMY TUBE INSERTION (N/A ) Diagnosis:       Dysphagia, unspecified type      Gastroesophageal reflux disease, unspecified whether esophagitis present      (Dysphagia, unspecified type [R13.10])      (Gastroesophageal reflux disease, unspecified whether esophagitis present [K21.9])    Surgeons: Brunner, Mark I, MD Provider: Kings Treviño MD    Anesthesia Type: general ASA Status: 3          Anesthesia Type: general    Vitals  Vitals Value Taken Time   BP     Temp     Pulse 73 01/31/22 0931   Resp     SpO2 95 % 01/31/22 0931   Vitals shown include unvalidated device data.        Post Anesthesia Care and Evaluation    Patient location during evaluation: PACU  Patient participation: complete - patient participated  Level of consciousness: awake and alert  Pain management: adequate  Airway patency: patent  Anesthetic complications: No anesthetic complications  PONV Status: none  Cardiovascular status: hemodynamically stable and acceptable  Respiratory status: nonlabored ventilation, acceptable and nasal cannula  Hydration status: acceptable

## 2022-02-01 NOTE — PLAN OF CARE
Goal Outcome Evaluation:  Plan of Care Reviewed With: patient        Progress: no change  Outcome Summary: Pt. S/P PEG placement.  Tolerating TF so far.  Pt. asleep at time of visit.  Did not demonstrate any visible signs of discomfort.  No family at BS.  Palliative care to follow for support, POC and symptom management.      1330, Palliative IDT meeting:  CLEM Bell RN, CHPN, [facility], OCN; AIMEE Go, ; MICHAEL Anders, APRN; MICHAEL Smalls RN, CHPN ; MCKAYLA Quiros RN, CHPN; ARTIE Rincon, CSW; ARTIE Valle RN, OCN; SHELLEY Navarrete, [facility], [facility]

## 2022-02-01 NOTE — PROGRESS NOTES
Met with patient and spouse. Agreeable to [facility]. She will be staying at [address] [place] [address] [place]. [place], [place]. Radha Garcia RN, [facility], Liaison

## 2022-02-01 NOTE — CASE MANAGEMENT/SOCIAL WORK
Continued Stay Note  [facility]     Patient Name: Raquel Esptiia  MRN: [id number]  Today's Date: 2/1/2022    Admit Date: 1/26/2022     Discharge Plan     Row Name 02/01/22 1414       Plan    Plan home with home health    Plan Comments I met with Faheem, Mrs. Espitia's  at the bedside to discuss discharge planning. A hospital bed and mechanical lift have been ordered. These will be delivered to his niece Stephy's house tomorrow. Mrs. Espitia will be discharged home on bolus feedings. I have called an order to Stalin with [facility]. The will deliver supplies and complete teaching prior to discharge. [facility] home care will follow Mrs. Espitia at home with skilled nursing, PT, OT, and SLP.  has made a referral to [facility] so that Mrs. Espitia can have a primary care provider make home visits. I have scheduled [facility] ambulance to transport Mrs. Espitia to [facility] on Thursday at 10:30 am. Faheem verbalizes understanding and agreement with this discharge plan.    Final Discharge Disposition Code 06 - home with home health care               Discharge Codes    No documentation.               Expected Discharge Date and Time     Expected Discharge Date Expected Discharge Time    Feb 3, 2022             Shar Connor RN

## 2022-02-01 NOTE — PROGRESS NOTES
Stroke Progress Note       Chief Complaint: Right-sided weakness and aphasia    Subjective    Subjective     Subjective:  She is drowsy this morning. Responds to painful stimuli. Assessed with Dr. Bryson at bedside. No acute changes overnight. PEG placed yesterday     Review of Systems   Unable to perform ROS: Patient nonverbal            Objective    Objective      Temp:  [97.6 °F (36.4 °C)-99.1 °F (37.3 °C)] 98.7 °F (37.1 °C)  Heart Rate:  [75-90] 90  Resp:  [18] 18  BP: (111-122)/(51-65) 114/58        Neurological Exam  Mental Status  Arousable to verbal stimuli and arousable to tactile stimuli. Nonverbal. Patient is nonverbal. Expressive aphasia present. Fund of knowledge is abnormal.    Cranial Nerves  CN II: Vision test: Left gaze preference. Visual fields full to confrontation. Responsive to visual threat. Responsive to visual threat.  CN III, IV, VI: Abnormal extraocular movements: Left gaze preference that does overcome the midline. Normal lids and orbits bilaterally. Pupils equal round and reactive to light bilaterally.  CN V:  Right: Normal corneal reflex.  Left: Normal corneal reflex.  CN VII:  Right: There is central facial weakness.  Left: There is no facial weakness.    Motor  Normal muscle bulk throughout. No fasciculations present. Increased muscle tone. Right upper extremity increased tone. No abnormal involuntary movements. Strength is 5/5 in all four extremities except as noted.  RUE strength 1/5, withdraws to painful stimulus, LUE strength 5/5 moving spontaneously.  Bilateral lower extremity strength 3/5.    Sensory  Light touch abnormality: Pinprick abnormality:   Decreased on the right.    Reflexes                                           Right                      Left  Plantar                           Downgoing                Upgoing    Gait  Not tested.      Physical Exam  Vitals reviewed.   Constitutional:       Appearance: Normal appearance.   HENT:      Head: Normocephalic and  atraumatic.   Eyes:      General: Lids are normal.      Extraocular Movements: EOM normal     Pupils: Pupils are equal, round, and reactive to light.      Comments: Left gaze preference   Cardiovascular:      Rate and Rhythm: Normal rate and regular rhythm.   Pulmonary:      Effort: Pulmonary effort is normal. No respiratory distress.   Skin:     General: Skin is warm and dry.   Neurological:      Mental Status: She is easily aroused.      Cranial Nerves: Cranial nerve deficit present.      Sensory: Sensory deficit present.      Motor: Weakness present.   Psychiatric:         Behavior: Behavior normal.         Results Review:      Lab Results   Component Value Date    GLUCOSE 120 (H) 01/30/2022    BUN 19 01/30/2022    CREATININE 0.57 01/30/2022    EGFRIFNONA 109 01/30/2022    BCR 33.3 (H) 01/30/2022    K 3.7 01/31/2022    CO2 25.0 01/30/2022    CALCIUM 9.0 01/30/2022    ALBUMIN 3.80 01/22/2022    AST 17 01/26/2022    ALT 20 01/26/2022         Results for orders placed during the hospital encounter of 01/20/22    Adult Transthoracic Echo Complete W/ Cont if Necessary Per Protocol (With Agitated Saline)    Interpretation Summary  · Left ventricular ejection fraction appears to be 56 - 60%. Left ventricular systolic function is normal.  · Saline test results are negative.  · Left ventricular diastolic function is consistent with (grade I) impaired relaxation.    MRI (1/26/2022) -multiple scattered regions of acute ischemia in the left corona radiata, left anterior limb of the internal capsule left external capsule, subcortical white matter of the left lobe and left superior temporal lobe and left body and genu of the corpus callosum, superimposed on recently noted regions of acute scattered infarcts (1/21/2022) of the left frontal parietal cortex and white matter        1/21/2022 -A1c 8.6, , HDL 29, LDL 50, triglycerides 255    Assessment/Plan     Assessment/Plan:  56-year-old  female with known medical  diagnosis of hypertension, hyperlipidemia, DM type II, chronic tobacco abuse, left MCA stroke (Nov 2020),  s/p left ICA stent (Nov 2020),  s/p right CEA (Dec 2020), and recent admission to [facility] for scattered ischemic strokes in left frontoparietal region as well as bilateral ICA occlusions who presented to [facility] ED on 1/26/2022 after being found lethargic and nonverbal at [facility].  Not a candidate for IV TPA secondary to time.  Not a candidate for neuro intervention as her CT perfusion demonstrated global hypoperfusion and was without evidence of reversible ischemia.      1. Acute multiterritorial strokes-etiology likely hemodynamic hypoperfusion versus stump emboli from her recent left ICA.  Poor collaterals on the left.  -start Eliquis 2.5 mg BID. Received 5 mg this morning so will defer an evening dose and begin with 2.5 mg in the AM    -Continue aspirin 81 mg daily  -Continue atorvastatin 80 mg daily  -Repeat CT head on 2/1/2022-stable    -PT/OT/ SLP following; PT recommends SNF at discharge  -Palliative care is following. Therapy is continuing to work with patient.  has decided to take the patient home with the assistance of home health     2.   HTN  -Avoid hypotension as this may cause further extension of her stroke, goal SBP > 120    3.   DMII  -A1c 8.6 on 1/21/2022  -Management per hospitalist    4.  Tobacco abuse  -Nicotine patch      Discussed plan of care with Dr. Bryson and will reach out to hospitalist LOURDES. Stroke neurology will continue to follow. Thank you for this consult.    LOURDES Barahona  02/01/22  11:36 EST

## 2022-02-01 NOTE — PROGRESS NOTES
Patient is on Apixaban. Education provided on 2/1/22 verbally and in writing to patient's  on patient's behalf. Information provided includes effects of medication, drug-drug and drug-food interactions, and signs/symptoms of bleeding and clotting. Patient's spouse verbalized understanding through teach back. All pertinent questions were answered.     Marry Cohn, PharmD, BCPS  2/1/2022  14:34 EST

## 2022-02-01 NOTE — PLAN OF CARE
Goal Outcome Evaluation:  Plan of Care Reviewed With: patient    SLP treatment completed. Will continue to address cognitive-communication and dysphagia in tx. Please see note for further details and recommendations.

## 2022-02-01 NOTE — PROGRESS NOTES
[facility] Medicine Services  PROGRESS NOTE    Patient Name: Raquel Espitia  : 1965  MRN: [id number]    Date of Admission: 2022  Primary Care Physician: Vikram Stewart MD    Subjective   Subjective     CC:  Encephalopathy nonverbal    HPI:  Patient resting in bed. No overnight events. Opens eyes briefly  ROS:  Unable to obtain secondary to mental status    Objective   Objective     Vital Signs:   Temp:  [97.6 °F (36.4 °C)-99.1 °F (37.3 °C)] 98.7 °F (37.1 °C)  Heart Rate:  [75-90] 90  Resp:  [18] 18  BP: (111-122)/(51-65) 114/58     Physical Exam:  Constitutional: No acute distress, sleeping, arouses to voice  HENT: NCAT, mucous membranes dry  Respiratory: Clear to auscultation bilaterally, respiratory effort normal   Cardiovascular: RRR, no murmurs, rubs, or gallops  Gastrointestinal: Positive bowel sounds, soft, nontender, nondistended, PEG intact with TF running  Musculoskeletal: No bilateral ankle edema  Psychiatric: sleeping  Neurologic: Nonverbal,opens eyes briefly to name and goes back to sleep  Skin: No rashes      Results Reviewed:  LAB RESULTS:      Lab 22  0845 22  0836 22  0831   WBC  --   --  10.95*   HEMOGLOBIN  --   --  16.0*   HEMOGLOBIN, POC 16.0  --   --    HEMATOCRIT  --   --  49.5*   HEMATOCRIT POC 47  --   --    PLATELETS  --   --  252   NEUTROS ABS  --   --  8.24*   IMMATURE GRANS (ABS)  --   --  0.05   LYMPHS ABS  --   --  1.86   MONOS ABS  --   --  0.72   EOS ABS  --   --  0.04   MCV  --   --  89.2   LACTATE  --   --  1.3   PROTIME  --  16.5*  --    APTT  --   --  35.4         Lab 22  1233 22  1424 22  0802 22  0737 22  0836   SODIUM  --  142  --  139  --    POTASSIUM 3.7 3.6 3.9 3.2*  --    CHLORIDE  --  103  --  102  --    CO2  --  25.0  --  24.0  --    ANION GAP  --  14.0  --  13.0  --    BUN  --  19  --  16  --    CREATININE  --  0.57  --  0.49* 1.00   GLUCOSE  --  120*  --  189*  --     CALCIUM  --  9.0  --  9.4  --          Lab 01/26/22  0831   ALT (SGPT) 20   AST (SGOT) 17         Lab 01/26/22  0836 01/26/22  0831   TROPONIN T  --  <0.010   PROTIME 16.5*  --    INR 1.4*  --                  Lab 01/26/22  0845   PH, ARTERIAL 7.39     Brief Urine Lab Results  (Last result in the past 365 days)      Color   Clarity   Blood   Leuk Est   Nitrite   Protein   CREAT   Urine HCG        01/26/22 0939 Yellow   Clear   Negative   Negative   Negative   Negative                 Microbiology Results Abnormal     Procedure Component Value - Date/Time    Blood Culture - Blood, Hand, Right [[id number]]  (Normal) Collected: 01/26/22 1015    Lab Status: Final result Specimen: Blood from Hand, Right Updated: 01/31/22 1115     Blood Culture No growth at 5 days    Blood Culture - Blood, Arm, Right [[id number]]  (Normal) Collected: 01/26/22 1045    Lab Status: Final result Specimen: Blood from Arm, Right Updated: 01/31/22 1100     Blood Culture No growth at 5 days    COVID PRE-OP / PRE-PROCEDURE SCREENING ORDER (NO ISOLATION) - Swab, Nasopharynx [[id number]]  (Normal) Collected: 01/26/22 2225    Lab Status: Final result Specimen: Swab from Nasopharynx Updated: 01/27/22 1410    Narrative:      The following orders were created for panel order COVID PRE-OP / PRE-PROCEDURE SCREENING ORDER (NO ISOLATION) - Swab, Nasopharynx.  Procedure                               Abnormality         Status                     ---------                               -----------         ------                     COVID-19, APTIMA PANTHER...[[id number]]  Normal              Final result                 Please view results for these tests on the individual orders.    COVID-19, APTIMA PANTHER PASTOR IN-HOUSE NP/OP SWAB IN UTM/VTM/SALINE TRANSPORT MEDIA 24HR TAT - Swab, Nasopharynx [[id number]]  (Normal) Collected: 01/26/22 2225    Lab Status: Final result Specimen: Swab from Nasopharynx Updated: 01/27/22 1410     COVID19 Not Detected    Narrative:       Fact sheet for providers: https://www.fda.gov/media/295159/download     Fact sheet for patients: https://www.fda.gov/media/355043/download    Test performed by RT PCR.    Urine Culture - Urine, Urine, Catheter In/Out [[id number]]  (Normal) Collected: 01/26/22 0939    Lab Status: Final result Specimen: Urine, Catheter In/Out Updated: 01/27/22 1301     Urine Culture No growth          CT Head Without Contrast    Result Date: 2/1/2022  EXAMINATION: CT HEAD WITHOUT CONTRAST DATE: 2/1/2022 4:11 AM  INDICATION: Stroke, follow-up.  COMPARISON: Head CT January 26, 2022. Brain MRI January 26, 2022.  TECHNIQUE:  Routine axial images through the head without contrast. Low-dose CT acquisition technique included one or more of the following options: 1. Automated exposure control, 2. Adjustment of mA and/or KV according to patient's size and/or 3. Use of iterative reconstruction. FINDINGS: Intracranial Contents: Continued evolution of the multiple origins are now subacute ischemia involving the left frontoparietal lobes, genu of the corpus callosum, and periventricular white matter. No associated hemorrhage. Mild edema without significant mass effect. No midline  shift. Chronic encephalomalacia in the right parietal and temporal occipital lobe. Additional more chronic infarcts in the lateral right frontal and parietal lobes. No extra-axial fluid collection. No hydrocephalus. Bones and Extracranial Soft Tissues: The calvarium is intact. Normal extracranial soft tissues. Mild mucosal thickening in the ethmoid air cells. Mucous retention cyst in the left maxillary sinus. The mastoid air cells are well aerated. Imaged orbits and globes are unremarkable. Distal internal carotid artery atherosclerotic calcifications.     Impression: 1. Expected continued evolution of the scattered left cerebral hemisphere and corpus callosum subacute infarcts. No evidence of hemorrhagic transformation. No significant mass effect. Electronically  signed by:  Marvin Mackenzie  2/1/2022 2:59 AM Mountain Time      Results for orders placed during the hospital encounter of 01/20/22    Adult Transthoracic Echo Complete W/ Cont if Necessary Per Protocol (With Agitated Saline)    Interpretation Summary  · Left ventricular ejection fraction appears to be 56 - 60%. Left ventricular systolic function is normal.  · Saline test results are negative.  · Left ventricular diastolic function is consistent with (grade I) impaired relaxation.      I have reviewed the medications:  Scheduled Meds:apixaban, 5 mg, Oral, Q12H  aspirin, 81 mg, Oral, Daily  atorvastatin, 80 mg, Per PEG Tube, Nightly  bisacodyl, 10 mg, Rectal, Daily  diazePAM, 2 mg, Per PEG Tube, BID  HYDROcodone-acetaminophen, 1 tablet, Per PEG Tube, BID  insulin regular, 0-7 Units, Subcutaneous, Q6H  pantoprazole, 40 mg, Intravenous, Q AM  PRO-STAT, 1 packet, Per G Tube, Daily  sodium chloride, 10 mL, Intravenous, Q12H  sodium chloride, 10 mL, Intravenous, Q12H      Continuous Infusions:   PRN Meds:.dextrose  •  dextrose  •  glucagon (human recombinant)  •  ipratropium-albuterol  •  lidocaine PF 1%  •  LORazepam  •  midazolam  •  ondansetron  •  potassium chloride  •  sodium chloride    Assessment/Plan   Assessment & Plan     Active Hospital Problems    Diagnosis  POA   • Acute CVA (cerebrovascular accident) (HCC) [I63.9]  Yes   • Dysphagia [R13.10]  Unknown   • GERD [K21.9]  Unknown      Resolved Hospital Problems   No resolved problems to display.        Brief Hospital Course to date:  Raquel Espitia is a 56 y.o. femalewith history of CVAs and multiple risk factors, inoperable bilateral ICA occlusion, discharged to [facility] on 1/25/22 on DAPT/statin, found unresponsive the morning of 1/26/2022 and returned to [facility] ED.     This patient's problems and plans were partially entered by my partner and updated as appropriate by me 02/01/22.      Recurrent CVA, left sided, embolic  Recent CVAs (discharged 1/25/2022)   Bilateral  carotid occlusion  Unresponsive  -2/1 CT head repeated with stable findings/ evolving strokes.   -start eliquis bid. Change ASA to 81mg daily  -continue lipitor 80mg   -Urine culture shows no growth, CXR unremarkable  -continue therapy services. Likely will not qualify to return to [facility]/ acute rehab. / family have decided to take patient home with assistance and Home health     Dysphagia  -speech following. PEG recommended and place per GI 1/31  -PEG cleared for use and TF started 1/31     Hypotension on arrival  - resolved w fluids     HTN/ HL    -Normotensive goals now  -currently BP normal without medications, continue to monitor and adjust as needed     DM  - A1C was 8.6 last admission.    -continue ssi and accuchecks. Adjust with TF initiation     Chronic pain/anxiety on chronic meds  - home dose is Norco 10q6 prn and gabapentin 800q8 and diazepam 5mg tid prn.   -ON arrival reduced doses to bid to prevent withdrawal from complicating the picture- will adjust to daily as patient appears comfortable.            DVT prophylaxis:  Medical and mechanical DVT prophylaxis orders are present.       AM-PAC 6 Clicks Score (PT): 8 (02/01/22 0800)    Disposition: I expect the patient to be discharged home with HH and 24 hr assistance possibly 2/2. Will need ambulance transport at DC    CODE STATUS:   Code Status and Medical Interventions:   Ordered at: 01/28/22 7578     Medical Intervention Limits:    NO intubation (DNI)     Level Of Support Discussed With:    Patient    Next of Kin (If No Surrogate)     Code Status (Patient has no pulse and is not breathing):    No CPR (Do Not Attempt to Resuscitate)     Medical Interventions (Patient has pulse or is breathing):    Limited Support     Comments:    disucssed with spouse Faheem Alvarez, APRN  02/01/22

## 2022-02-01 NOTE — THERAPY TREATMENT NOTE
Acute Care - Speech Language Pathology Treatment Note  [facility]     Patient Name: Raquel Espitia  : 1965  MRN: [id number]  Today's Date: 2022               Admit Date: 2022     Visit Dx:    ICD-10-CM ICD-9-CM   1. Acute CVA (cerebrovascular accident) ([facility])  I63.9 434.91   2. Confusion  R41.0 298.9   3. Aphasia  R47.01 784.3   4. Cognitive communication deficit  R41.841 799.52   5. Dysphagia, unspecified type  R13.10 787.20   6. Gastroesophageal reflux disease, unspecified whether esophagitis present  K21.9 530.81   7. Acute ischemic stroke ([facility])  I63.9 434.91   8. Bilateral carotid occlusion despite previous right CEA and left ICA stents.  Reconstitutes from external carotid arteries  I65.23 433.10     433.30     Patient Active Problem List   Diagnosis   • Generalized anxiety disorder   • Uncontrolled T2DM with HbA1c 8.6   • HTN   • Chronic tobacco abuse (1.5 PPD x 40 yrs)   • Tobacco abuse counseling   • GERD   • HFpEF   • Hyperlipidemia LDL goal <70   • Thyroid nodule   • History of left cerebrovascular accident 2020 (CMS/[facility])   • Chronic, continuous use of opioids   • Bilateral carotid artery disease ([facility])   • Chronic bilateral low back pain without sciatica   • History of chest pain   • Degenerative joint disease (DJD) of lumbar spine   • Palpitations   • Acute ischemic cerebrovascular accident (CVA) ([facility])   • Bilateral carotid occlusion despite previous right CEA and left ICA stents.  Reconstitutes from external carotid arteries   • Acute ischemic stroke ([facility])   • Acute CVA (cerebrovascular accident) ([facility])   • Dysphagia     Past Medical History:   Diagnosis Date   • Acute bronchitis    • Arthritis    • Asthma    • COPD (chronic obstructive pulmonary disease) ([facility])    • Diabetes mellitus ([facility])     TWICE PER WEEK CHECKS SUGAR    • Edema    • Fibromyalgia    • GERD (gastroesophageal reflux disease)    • Headache    • History of Holter monitoring     23 days worn   • Hyperlipidemia    •  Hypertension    • Impaired fasting glucose    • Low back pain    • Migraine    • Pain of left calf    • Shingles outbreak     8-9  YEARS AGO- THINK HAD IT    • Stroke (HCC)     X2 TIA -  BOTH SIDES EFFECTED    • Wears dentures     FULL BUT DOESNT WEAR ALWAYS    • Wears glasses      Past Surgical History:   Procedure Laterality Date   • ABDOMINAL SURGERY      X2 ULCER REPAIR    • BACK SURGERY      X2 fusion lumbar   • CAROTID ENDARTERECTOMY Right 2020    Procedure: CAROTID ENDARTERECTOMY WITH EEG RIGHT;  Surgeon: Gregg Patrick MD;  Location:  [facility];  Service: Neurosurgery;  Laterality: Right;   •  SECTION     • CHOLECYSTECTOMY     • COLONOSCOPY     • ENDOSCOPY     • ENDOSCOPY W/ PEG TUBE PLACEMENT N/A 2022    Procedure: ESOPHAGOGASTRODUODENOSCOPY WITH PERCUTANEOUS ENDOSCOPIC GASTROSTOMY TUBE INSERTION;  Surgeon: Brunner, Mark I, MD;  Location:  [facility];  Service: Gastroenterology;  Laterality: N/A;  incision @ 921  ancef 2G given per CRNA    • HAND SURGERY      RIGHT nerve repair   • INTERVENTIONAL RADIOLOGY PROCEDURE Bilateral 2020    Procedure: CAROTID CEREBRAL ANGIOGRAM BILATERAL;  Surgeon: Gregg Patrick MD;  Location:  [facility];  Service: Interventional Radiology;  Laterality: Bilateral;   • NECK SURGERY      cervicle spine plate        SLP Recommendation and Plan  SLP Diagnosis: Mrs. Espitia presents w/a severe-profoud receptive/expressive aphasia. Unable to verbalize or consistently follow commands. Suspect at least some degree of apraxia 2/2 severity of oral deficits. Unable to assess cog 2/2 severity of aphasia. Will f/u to address deficits. (22 1000)        Swallow Criteria for Skilled Therapeutic Interventions Met: demonstrates skilled criteria (22 1000)  SLC Criteria for Skilled Therapy Interventions Met: yes (22 1000)  Anticipated Discharge Disposition (SLP): anticipate therapy at next level of care (22 1000)      Therapy Frequency (Swallow): 5 days per week (02/01/22 1000)  Predicted Duration Therapy Intervention (Days): until discharge (02/01/22 1000)  Daily Summary of Progress (SLP): progress toward functional goals as expected (02/01/22 1000)        Communication Strategy Suggestions: eliminate distractions when speaking with patient, avoid open-ended questions, avoid multi-step instructions (02/01/22 1000)  Treatment Assessment (SLP): Patient continues to present with severe expressive/receptive deficits. No verbal responses and unableto folow simple commands this am. Trials of ice and pureed presented with delayed hard cough following all.  SLP to continue to follow to address deficits with communication and swallowing in tx. (02/01/22 1000)  Plan for Continued Treatment (SLP): continue treatment per plan of care (02/01/22 1000)         SLP EVALUATION (last 72 hours)     SLP SLC Evaluation     Row Name 02/01/22 1000                   Communication Assessment/Intervention    Document Type therapy note (daily note)  -        Patient Observations lethargic; cooperative  -        Patient/Family/Caregiver Comments/Observations No family present  -        Patient Effort fair  -        Symptoms Noted During/After Treatment none  -                  General Information    Patient Profile Reviewed yes  -                  Pain    Additional Documentation Pain Scale: FACES Pre/Post-Treatment (Group)  -                  Pain Scale: FACES Pre/Post-Treatment    Pain: FACES Scale, Pretreatment 0-->no hurt  -        Posttreatment Pain Rating 0-->no hurt  -                  SLP Evaluation Clinical Impressions    SLP Diagnosis Mrs. Espitia presents w/a severe-profoud receptive/expressive aphasia. Unable to verbalize or consistently follow commands. Suspect at least some degree of apraxia 2/2 severity of oral deficits. Unable to assess cog 2/2 severity of aphasia. Will f/u to address deficits.  -        Rehab  Potential/Prognosis guarded  -        SLC Criteria for Skilled Therapy Interventions Met yes  -CH        Functional Impact functional impact in ADLs; unable to care for self; difficulty communicating wants, needs  -                  SLP Treatment Clinical Impressions    Daily Summary of Progress (SLP) progress toward functional goals as expected  -CH        Barriers to Overall Progress (SLP) Lethargy  -CH        Plan for Continued Treatment (SLP) continue treatment per plan of care  -CH        Care Plan Review care plan/treatment goals reviewed  -                  Recommendations    Therapy Frequency (SLP SLC) 5 days per week  -CH        Predicted Duration Therapy Intervention (Days) until discharge  -CH        Anticipated Discharge Disposition (SLP) anticipate therapy at next level of care  -        Communication Strategy Suggestions eliminate distractions when speaking with patient; avoid open-ended questions; avoid multi-step instructions  -              User Key  (r) = Recorded By, (t) = Taken By, (c) = Cosigned By    Initials Name Effective Dates     Chantell Julian, MS CCC-SLP 06/16/21 -                    EDUCATION  The patient has been educated in the following areas:     Cognitive Impairment Communication Impairment.           SLP GOALS     Row Name 02/01/22 1000             Oral Nutrition/Hydration Goal 1 (SLP)    Oral Nutrition/Hydration Goal 1, SLP LTG: Pt will demonstrate improved swallowing, per clinical assessment in tx, warranting instrumental swallow study.  -      Time Frame (Oral Nutrition/Hydration Goal 1, SLP) by discharge  -      Progress/Outcomes (Oral Nutrition/Hydration Goal 1, SLP) continuing progress toward goal  -              Oral Nutrition/Hydration Goal 2 (SLP)    Oral Nutrition/Hydration Goal 2, SLP Pt will tolerate therapeutic trials of H2O, puree/pudding w/o s/sxs aspiration w/ 50%acc and 1:1 assist in order to determine readiness for instrumental swallow study.   -CH      Time Frame (Oral Nutrition/Hydration Goal 2, SLP) short term goal (STG)  -CH      Barriers (Oral Nutrition/Hydration Goal 2, SLP) Delayed hard cough with pudding and ice trials.  -CH      Progress/Outcomes (Oral Nutrition/Hydration Goal 2, SLP) continuing progress toward goal  -CH              Comprehend Questions Goal 1 (SLP)    Improve Ability to Comprehend Questions Goal 1 (SLP) questions about personal information; simple yes/no questions; 60%; with maximum cues (25-49%)  -CH      Time Frame (Comprehend Questions Goal 1, SLP) short term goal (STG)  -CH      Progress (Ability to Comprehend Questions Goal 1, SLP) 0%; with maximum cues (25-49%)  -      Progress/Outcomes (Comprehend Questions Goal 1, SLP) progress slower than expected  -              Follow Directions Goal 2 (SLP)    Improve Ability to Follow Directions Goal 1 (SLP) 1 step direction with objects; 1 step direction without objects; 60%; with moderate cues (50-74%)  -      Time Frame (Follow Directions Goal 1, SLP) short term goal (STG)  -CH      Progress (Ability to Follow Directions Goal 1, SLP) 0%; with moderate cues (50-74%)  -      Progress/Outcomes (Follow Directions Goal 1, SLP) progress slower than expected  -              Word Retrieval Skills Goal 1 (SLP)    Improve Word Retrieval Skills By Goal 1 (SLP) completing automatic speech task, counting; completing automatic speech task, sing “Happy Birthday”; confrontational naming task; completing automatic speech task, alphabet; completing automatic speech task, days of the week; high frequency; repeating sounds; repeating words; 60%; with moderate cues (50-74%)  -      Time Frame (Word Retrieval Goal 1, SLP) short term goal (STG)  -CH      Progress (Word Retrieval Skills Goal 1, SLP) 0%; with maximum cues (25-49%)  -      Progress/Outcomes (Word Retrieval Goal 1, SLP) progress slower than expected  -              Motor Planning Goal 1 (SLP)    Improve Motor Planning to  Reduce Apraxia by Goal 1 (SLP) imitating mouth postures; imitating vowels; following isolated oral commands; 60%; with moderate cues (50-74%)  -CH      Time Frame (Motor Planning Goal 1, SLP) short term goal (STG)  -CH      Progress (Motor Planning Goal 1, SLP) 0%; with moderate cues (50-74%)  -CH      Progress/Outcomes (Motor Planning Goal 1, SLP) progress slower than expected  -CH      Comment (Motor Planning Goal 1, SLP) vowels  -CH              Additional Goal 1 (SLP)    Additional Goal 1, SLP LTG: Pt will improve language skills to be able to effectively communicate her wants and needs to be able to functionally participate in her care  -CH      Time Frame (Additional Goal 1, SLP) by discharge  -CH      Progress/Outcomes (Additional Goal 1, SLP) progress slower than expected  -CH            User Key  (r) = Recorded By, (t) = Taken By, (c) = Cosigned By    Initials Name Provider Type     Chantell Julian MS CCC-SLP Speech and Language Pathologist                        Time Calculation:      Time Calculation- SLP     Row Name 02/01/22 1055             Time Calculation- SLP    SLP Start Time 1000  -CH      SLP Received On 02/01/22  -              Untimed Charges    84405-GW Treatment/ST Modification Prosth Aug Alter  38  -CH      05244-QA Treatment Swallow Minutes 30  -CH              Total Minutes    Untimed Charges Total Minutes 68  -CH       Total Minutes 68  -CH            User Key  (r) = Recorded By, (t) = Taken By, (c) = Cosigned By    Initials Name Provider Type     Chantell Julian MS CCC-SLP Speech and Language Pathologist                Therapy Charges for Today     Code Description Service Date Service Provider Modifiers Qty    98624360642 HC ST TREATMENT SWALLOW 2 2/1/2022 Chantell Julian MS CCC-SLP GN 1    24900132568 HC ST TREATMENT SPEECH 3 2/1/2022 Chantell Julian MS CCC-SLP GN 1                     Chantell Julian MS CCC-SLP  2/1/2022 and Acute Care - Speech Language Pathology    Swallow Treatment Note  [facility]     Patient Name: Raquel Espitia  : 1965  MRN: [id number]  Today's Date: 2022               Admit Date: 2022    Visit Dx:     ICD-10-CM ICD-9-CM   1. Acute CVA (cerebrovascular accident) ([facility])  I63.9 434.91   2. Confusion  R41.0 298.9   3. Aphasia  R47.01 784.3   4. Cognitive communication deficit  R41.841 799.52   5. Dysphagia, unspecified type  R13.10 787.20   6. Gastroesophageal reflux disease, unspecified whether esophagitis present  K21.9 530.81   7. Acute ischemic stroke ([facility])  I63.9 434.91   8. Bilateral carotid occlusion despite previous right CEA and left ICA stents.  Reconstitutes from external carotid arteries  I65.23 433.10     433.30     Patient Active Problem List   Diagnosis   • Generalized anxiety disorder   • Uncontrolled T2DM with HbA1c 8.6   • HTN   • Chronic tobacco abuse (1.5 PPD x 40 yrs)   • Tobacco abuse counseling   • GERD   • HFpEF   • Hyperlipidemia LDL goal <70   • Thyroid nodule   • History of left cerebrovascular accident 2020 (CMS/[facility])   • Chronic, continuous use of opioids   • Bilateral carotid artery disease ([facility])   • Chronic bilateral low back pain without sciatica   • History of chest pain   • Degenerative joint disease (DJD) of lumbar spine   • Palpitations   • Acute ischemic cerebrovascular accident (CVA) ([facility])   • Bilateral carotid occlusion despite previous right CEA and left ICA stents.  Reconstitutes from external carotid arteries   • Acute ischemic stroke ([facility])   • Acute CVA (cerebrovascular accident) ([facility])   • Dysphagia     Past Medical History:   Diagnosis Date   • Acute bronchitis    • Arthritis    • Asthma    • COPD (chronic obstructive pulmonary disease) ([facility])    • Diabetes mellitus ([facility])     TWICE PER WEEK CHECKS SUGAR    • Edema    • Fibromyalgia    • GERD (gastroesophageal reflux disease)    • Headache    • History of Holter monitoring     23 days worn   • Hyperlipidemia    • Hypertension    • Impaired  fasting glucose    • Low back pain    • Migraine    • Pain of left calf    • Shingles outbreak     8-9  YEARS AGO- THINK HAD IT    • Stroke (HCC)     X2 TIA -  BOTH SIDES EFFECTED    • Wears dentures     FULL BUT DOESNT WEAR ALWAYS    • Wears glasses      Past Surgical History:   Procedure Laterality Date   • ABDOMINAL SURGERY      X2 ULCER REPAIR    • BACK SURGERY      X2 fusion lumbar   • CAROTID ENDARTERECTOMY Right 2020    Procedure: CAROTID ENDARTERECTOMY WITH EEG RIGHT;  Surgeon: Gregg Patrick MD;  Location:  [facility];  Service: Neurosurgery;  Laterality: Right;   •  SECTION     • CHOLECYSTECTOMY     • COLONOSCOPY     • ENDOSCOPY     • ENDOSCOPY W/ PEG TUBE PLACEMENT N/A 2022    Procedure: ESOPHAGOGASTRODUODENOSCOPY WITH PERCUTANEOUS ENDOSCOPIC GASTROSTOMY TUBE INSERTION;  Surgeon: Brunner, Mark I, MD;  Location:  [facility];  Service: Gastroenterology;  Laterality: N/A;  incision @ 921  ancef 2G given per CRNA    • HAND SURGERY      RIGHT nerve repair   • INTERVENTIONAL RADIOLOGY PROCEDURE Bilateral 2020    Procedure: CAROTID CEREBRAL ANGIOGRAM BILATERAL;  Surgeon: Gregg Patrick MD;  Location:  [facility];  Service: Interventional Radiology;  Laterality: Bilateral;   • NECK SURGERY      cervicle spine plate        SLP Recommendation and Plan  SLP Swallowing Diagnosis: mod-severe, oral dysphagia, suspected pharyngeal dysphagia (22 1000)  SLP Diet Recommendation: NPO, temporary alternate methods of nutrition/hydration, other (see comments) (22 1000)     SLP Rec. for Method of Medication Administration: meds via alternate route (22 1000)     Monitor for Signs of Aspiration: yes, notify SLP if any concerns (22 1000)     Swallow Criteria for Skilled Therapeutic Interventions Met: demonstrates skilled criteria (22 1000)  Anticipated Discharge Disposition (SLP): anticipate therapy at next level of care (22 1000)  Rehab  Potential/Prognosis, Swallowing: re-evaluate goals as necessary (02/01/22 1000)  Therapy Frequency (Swallow): 5 days per week (02/01/22 1000)  Predicted Duration Therapy Intervention (Days): until discharge (02/01/22 1000)     Daily Summary of Progress (SLP): progress toward functional goals as expected (02/01/22 1000)            Communication Strategy Suggestions: eliminate distractions when speaking with patient, avoid open-ended questions, avoid multi-step instructions (02/01/22 1000)  Treatment Assessment (SLP): Patient continues to present with severe expressive/receptive deficits. No verbal responses and unableto folow simple commands this am. Trials of ice and pureed presented with delayed hard cough following all.  SLP to continue to follow to address deficits with communication and swallowing in tx. (02/01/22 1000)  Plan for Continued Treatment (SLP): continue treatment per plan of care (02/01/22 1000)         Plan of Care Reviewed With: patient      SWALLOW EVALUATION (last 72 hours)     SLP Adult Swallow Evaluation     Row Name 02/01/22 1000                   SLP Evaluation Clinical Impression    SLP Swallowing Diagnosis mod-severe; oral dysphagia; suspected pharyngeal dysphagia  -        Functional Impact risk of aspiration/pneumonia; risk of malnutrition; risk of dehydration  -        Rehab Potential/Prognosis, Swallowing re-evaluate goals as necessary  -        Swallow Criteria for Skilled Therapeutic Interventions Met demonstrates skilled criteria  -                  SLP Treatment Clinical Impressions    Treatment Assessment (SLP) Patient continues to present with severe expressive/receptive deficits. No verbal responses and unableto folow simple commands this am. Trials of ice and pureed presented with delayed hard cough following all.  SLP to continue to follow to address deficits with communication and swallowing in tx.  -                  Recommendations    Therapy Frequency (Swallow) 5  days per week  -        SLP Diet Recommendation NPO; temporary alternate methods of nutrition/hydration; other (see comments)  -        Oral Care Recommendations Oral Care BID/PRN; Suction toothbrush  -        SLP Rec. for Method of Medication Administration meds via alternate route  -        Monitor for Signs of Aspiration yes; notify SLP if any concerns  -              User Key  (r) = Recorded By, (t) = Taken By, (c) = Cosigned By    Initials Name Effective Dates     Chantell Julian, MS CCC-SLP 06/16/21 -                 EDUCATION  The patient has been educated in the following areas:   Dysphagia (Swallowing Impairment) Oral Care/Hydration NPO rationale.        SLP GOALS     Row Name 02/01/22 1000             Oral Nutrition/Hydration Goal 1 (SLP)    Oral Nutrition/Hydration Goal 1, SLP LTG: Pt will demonstrate improved swallowing, per clinical assessment in tx, warranting instrumental swallow study.  -      Time Frame (Oral Nutrition/Hydration Goal 1, SLP) by discharge  -      Progress/Outcomes (Oral Nutrition/Hydration Goal 1, SLP) continuing progress toward goal  -              Oral Nutrition/Hydration Goal 2 (SLP)    Oral Nutrition/Hydration Goal 2, SLP Pt will tolerate therapeutic trials of H2O, puree/pudding w/o s/sxs aspiration w/ 50%acc and 1:1 assist in order to determine readiness for instrumental swallow study.  -      Time Frame (Oral Nutrition/Hydration Goal 2, SLP) short term goal (STG)  -      Barriers (Oral Nutrition/Hydration Goal 2, SLP) Delayed hard cough with pudding and ice trials.  -      Progress/Outcomes (Oral Nutrition/Hydration Goal 2, SLP) continuing progress toward goal  -              Comprehend Questions Goal 1 (SLP)    Improve Ability to Comprehend Questions Goal 1 (SLP) questions about personal information; simple yes/no questions; 60%; with maximum cues (25-49%)  -      Time Frame (Comprehend Questions Goal 1, SLP) short term goal (STG)  -       Progress (Ability to Comprehend Questions Goal 1, SLP) 0%; with maximum cues (25-49%)  -CH      Progress/Outcomes (Comprehend Questions Goal 1, SLP) progress slower than expected  -CH              Follow Directions Goal 2 (SLP)    Improve Ability to Follow Directions Goal 1 (SLP) 1 step direction with objects; 1 step direction without objects; 60%; with moderate cues (50-74%)  -CH      Time Frame (Follow Directions Goal 1, SLP) short term goal (STG)  -CH      Progress (Ability to Follow Directions Goal 1, SLP) 0%; with moderate cues (50-74%)  -CH      Progress/Outcomes (Follow Directions Goal 1, SLP) progress slower than expected  -CH              Word Retrieval Skills Goal 1 (SLP)    Improve Word Retrieval Skills By Goal 1 (SLP) completing automatic speech task, counting; completing automatic speech task, sing “Happy Birthday”; confrontational naming task; completing automatic speech task, alphabet; completing automatic speech task, days of the week; high frequency; repeating sounds; repeating words; 60%; with moderate cues (50-74%)  -CH      Time Frame (Word Retrieval Goal 1, SLP) short term goal (STG)  -CH      Progress (Word Retrieval Skills Goal 1, SLP) 0%; with maximum cues (25-49%)  -CH      Progress/Outcomes (Word Retrieval Goal 1, SLP) progress slower than expected  -CH              Motor Planning Goal 1 (SLP)    Improve Motor Planning to Reduce Apraxia by Goal 1 (SLP) imitating mouth postures; imitating vowels; following isolated oral commands; 60%; with moderate cues (50-74%)  -CH      Time Frame (Motor Planning Goal 1, SLP) short term goal (STG)  -CH      Progress (Motor Planning Goal 1, SLP) 0%; with moderate cues (50-74%)  -CH      Progress/Outcomes (Motor Planning Goal 1, SLP) progress slower than expected  -CH      Comment (Motor Planning Goal 1, SLP) vowels  -CH              Additional Goal 1 (SLP)    Additional Goal 1, SLP LTG: Pt will improve language skills to be able to effectively communicate  her wants and needs to be able to functionally participate in her care  -CH      Time Frame (Additional Goal 1, SLP) by discharge  -CH      Progress/Outcomes (Additional Goal 1, SLP) progress slower than expected  -            User Key  (r) = Recorded By, (t) = Taken By, (c) = Cosigned By    Initials Name Provider Type    Chantell Woods MS CCC-SLP Speech and Language Pathologist                   Time Calculation:    Time Calculation- SLP     Row Name 02/01/22 1055             Time Calculation- SLP    SLP Start Time 1000  -CH      SLP Received On 02/01/22  -              Untimed Charges    47054-RM Treatment/ST Modification Prosth Aug Alter  38  -CH      81446-CU Treatment Swallow Minutes 30  -CH              Total Minutes    Untimed Charges Total Minutes 68  -CH       Total Minutes 68  -CH            User Key  (r) = Recorded By, (t) = Taken By, (c) = Cosigned By    Initials Name Provider Type    Chantell Woods MS CCC-SLP Speech and Language Pathologist                Therapy Charges for Today     Code Description Service Date Service Provider Modifiers Qty    36144890070  ST TREATMENT SWALLOW 2 2/1/2022 JulianChantell manzo, MS CCC-SLP GN 1    73443043123 HC ST TREATMENT SPEECH 3 2/1/2022 Chantell Julian MS CCC-SLP GN 1               Chantell Julian MS CCC-SLP  2/1/2022     Patient was not wearing a face mask and did exhibit coughing during this therapy encounter.  Procedure performed was aerosolizing, involved close contact (within 6 feet for at least 15 minutes or longer), and did not involve contact with infectious secretions or specimens.  Therapist used appropriate personal protective equipment including gloves, standard procedure mask and eye protection.  Appropriate PPE was worn during the entire therapy session.  Hand hygiene was completed before and after therapy session.

## 2022-02-01 NOTE — PROGRESS NOTES
Nutrition Services    Patient Name:  Raquel Espitia  YOB: 1965  MRN: [id number]  Admit Date:  1/26/2022      RD notes patient to discharge home with home health and EN soon.  reports [facility] reports a tube feeding pump shortage, so bolus regimen would be best. RD would like to initiate bolus regimen here in hospital to ensure EN tolerance prior to discharge. RN to go ahead and turn off EN and will resume with bolus regimen around noon. Cartons of Diabetisource currently not in stock here at [facility], so will have to bolus using the tube feeding pump - informed RN of this.    RD ordered the following bolus EN regimen to begin today at 12pm:    To establish tolerance, will begin with the following advancement protocol (2/1):  12 pm - 60 ml water flush, 125 ml formula, 60 ml water flush  4 pm - 60 ml water flush, 250 ml formula, 60 ml water flush  8pm - 60 ml water flush, 375 ml formula, 60 ml water flush    Beginning (2/2) patient will receive the following EN regimen:  Diabetisource AC, 5 cartons daily  4 feedings total:  8am - 60 ml water flush, 375 ml (1.5 cartons) formula, 60 ml water flush  12pm - 60 ml water flush, 250 ml (1 carton) formula, 60 ml water flush  4pm - 60 ml water flush, 250 ml (1 carton) formula, 60 ml water flush  8pm - 60 ml water flush, 375 ml (1.5 cartons) formula, 60 ml water flush    At home patient's EN regimen schedule/timing is up to family/caregiver. Recommend 4 hours between bolus feedings    This regimen will 1250 ml formula, 1500 calories (100% est needs), 75 g protein (94% est needs), 19 g fiber, 1020 ml water from formula, and 1500 ml total water from formula/flushes.      Electronically signed by:  Chula De Guzman RD  02/01/22 10:54 EST

## 2022-02-01 NOTE — PLAN OF CARE
Problem: Adult Inpatient Plan of Care  Goal: Plan of Care Review  Recent Flowsheet Documentation  Taken 2/1/2022 1411 by Sussy Valdes OT  Progress: no change  Plan of Care Reviewed With:   patient   spouse  Outcome Summary: Pt very lethargic this date, only briefly with eyes opened during bed mobility with increased stimuli. REq'd max A x 2 for rolling L and scooting to HOB for pressure reduction/position change and improved support of RUE. Pt dependent for d/d gown and washing face after [place] A attempt. Pt tolerated PROM at [facility] and AAROM at E x 10 reps each, proximal to distal, no indicators of pain. Pt spouse present for education regarding need for carryover of TE for joint mobility to reduce risk for contractures, skin breadown, etc with hypertonicity still present at [facility]. Will progress pt as able while hospitalized.

## 2022-02-01 NOTE — THERAPY TREATMENT NOTE
Patient Name: Raquel Espitia  : 1965    MRN: [id number]                              Today's Date: 2022       Admit Date: 2022    Visit Dx:     ICD-10-CM ICD-9-CM   1. Acute CVA (cerebrovascular accident) (Formerly [facility])  I63.9 434.91   2. Confusion  R41.0 298.9   3. Aphasia  R47.01 784.3   4. Cognitive communication deficit  R41.841 799.52   5. Dysphagia, unspecified type  R13.10 787.20   6. Gastroesophageal reflux disease, unspecified whether esophagitis present  K21.9 530.81   7. Acute ischemic stroke (Formerly [facility])  I63.9 434.91   8. Bilateral carotid occlusion despite previous right CEA and left ICA stents.  Reconstitutes from external carotid arteries  I65.23 433.10     433.30     Patient Active Problem List   Diagnosis   • Generalized anxiety disorder   • Uncontrolled T2DM with HbA1c 8.6   • HTN   • Chronic tobacco abuse (1.5 PPD x 40 yrs)   • Tobacco abuse counseling   • GERD   • HFpEF   • Hyperlipidemia LDL goal <70   • Thyroid nodule   • History of left cerebrovascular accident 2020 (CMS/Formerly [facility])   • Chronic, continuous use of opioids   • Bilateral carotid artery disease (Formerly [facility])   • Chronic bilateral low back pain without sciatica   • History of chest pain   • Degenerative joint disease (DJD) of lumbar spine   • Palpitations   • Acute ischemic cerebrovascular accident (CVA) (Formerly [facility])   • Bilateral carotid occlusion despite previous right CEA and left ICA stents.  Reconstitutes from external carotid arteries   • Acute ischemic stroke (Formerly [facility])   • Acute CVA (cerebrovascular accident) (Formerly [facility])   • Dysphagia     Past Medical History:   Diagnosis Date   • Acute bronchitis    • Arthritis    • Asthma    • COPD (chronic obstructive pulmonary disease) (Formerly [facility])    • Diabetes mellitus (Formerly [facility])     TWICE PER WEEK CHECKS SUGAR    • Edema    • Fibromyalgia    • GERD (gastroesophageal reflux disease)    • Headache    • History of Holter monitoring     23 days worn   • Hyperlipidemia    • Hypertension    • Impaired fasting glucose    • Low  back pain    • Migraine    • Pain of left calf    • Shingles outbreak     8-9  YEARS AGO- THINK HAD IT    • Stroke (HCC)     X2 TIA -  BOTH SIDES EFFECTED    • Wears dentures     FULL BUT DOESNT WEAR ALWAYS    • Wears glasses      Past Surgical History:   Procedure Laterality Date   • ABDOMINAL SURGERY      X2 ULCER REPAIR    • BACK SURGERY      X2 fusion lumbar   • CAROTID ENDARTERECTOMY Right 2020    Procedure: CAROTID ENDARTERECTOMY WITH EEG RIGHT;  Surgeon: Gregg Patrick MD;  Location:  [facility];  Service: Neurosurgery;  Laterality: Right;   •  SECTION     • CHOLECYSTECTOMY     • COLONOSCOPY     • ENDOSCOPY     • ENDOSCOPY W/ PEG TUBE PLACEMENT N/A 2022    Procedure: ESOPHAGOGASTRODUODENOSCOPY WITH PERCUTANEOUS ENDOSCOPIC GASTROSTOMY TUBE INSERTION;  Surgeon: Brunner, Mark I, MD;  Location:  [facility];  Service: Gastroenterology;  Laterality: N/A;  incision @ 921  ancef 2G given per CRNA    • HAND SURGERY      RIGHT nerve repair   • INTERVENTIONAL RADIOLOGY PROCEDURE Bilateral 2020    Procedure: CAROTID CEREBRAL ANGIOGRAM BILATERAL;  Surgeon: Gregg Patrick MD;  [facility]  [facility];  Service: Interventional Radiology;  Laterality: Bilateral;   • NECK SURGERY      cervicle spine plate       General Information     Row Name 22 135          OT Time and Intention    Document Type therapy note (daily note)  -JY     Mode of Treatment occupational therapy; individual therapy  -JY     Row Name 22 5299          General Information    Patient Profile Reviewed yes  -JY     Existing Precautions/Restrictions fall; other (see comments)  PEG tube, hypertonicity R UE/LE, pushes to R side in sitting, unilateral neglect, aphasia, grossly nonverbal  -JY     Barriers to Rehab medically complex; previous functional deficit; cognitive status; visual deficit  -JY     Row Name 22 4564          Cognition    Orientation Status (Cognition) unable/difficult  to assess; other (see comments)  pt grossly aphasic, nonverbal and lethargic this date  -EDEN     Row Name 02/01/22 1357          Safety Issues, Functional Mobility    Safety Issues Affecting Function (Mobility) awareness of need for assistance; insight into deficits/self-awareness; judgment; problem-solving; safety precaution awareness; safety precautions follow-through/compliance; sequencing abilities; ability to follow commands  -JY     Impairments Affecting Function (Mobility) balance; cognition; coordination; endurance/activity tolerance; motor control; muscle tone abnormal; postural/trunk control; range of motion (ROM); strength; visual/perceptual  -JY     Cognitive Impairments, Mobility Safety/Performance awareness, need for assistance; insight into deficits/self-awareness; judgment; problem-solving/reasoning; safety precaution awareness; safety precaution follow-through; sequencing abilities  -JNOAH     Comment, Safety Issues/Impairments (Mobility) pt very lethargic this date, only briefly aroused and with eyes opened, sitting EOB safety concern this date  -EDEN           User Key  (r) = Recorded By, (t) = Taken By, (c) = Cosigned By    Initials Name Provider Type    Sussy Pollock OT Occupational Therapist                 Mobility/ADL's     Row Name 02/01/22 1400          Bed Mobility    Bed Mobility rolling left; scooting/bridging  -JNOAH     Rolling Left [place] (Bed Mobility) maximum assist (25% patient effort); 2 person assist; verbal cues; nonverbal cues (demo/gesture)  -EDEN     Scooting/Bridging [place] (Bed Mobility) maximum assist (25% patient effort); 2 person assist; verbal cues; nonverbal cues (demo/gesture)  -JY     Bed Mobility, Safety Issues cognitive deficits limit understanding; decreased use of arms for pushing/pulling; decreased use of legs for bridging/pushing; impaired trunk control for bed mobility  -JNOAH     Assistive Device (Bed Mobility) bed rails; draw sheet  -JNOAH     Comment (Bed  Mobility) pt very lethargic this date, only sustained opened eyes briefly during bed mobility thus grossly req'd increased A for bed mobility as not engaged or participatory; req'd rolling to pt's L side for routine change of position for pressure relief/reduction thus u/a to utilize RUE to reach for and grasp bed rails, gross max A x 2 for rolling L and being scooted up in bed  -[employer]     Row Name 02/01/22 1400          Transfers    Comment (Transfers) EOB or OOB mobility deferred this date d/t pt lethargy and safety concern  -     Bed-Chair [place] (Transfers) not tested  -[employer]     Row Name 02/01/22 1400          Functional Mobility    Functional Mobility- Ind. Level not tested; unable to perform  -     Row Name 02/01/22 1400          Activities of Daily Living    BADL Assessment/Intervention upper body dressing; grooming  -     Row Name 02/01/22 1400          Upper Body Dressing Assessment/Training    [place] Level (Upper Body Dressing) pajama/robe; don; dependent (less than 25% patient effort); doff; other (see comments)  pt repeatedly doffing gown with what appears to be preference without  -JY     Position (Upper Body Dressing) supine  -JY     Comment (Upper Body Dressing) pt repeatedly doffing gown with what appears to be preference without, continuous movement results in gown doffing, not technical in removal strategy  -[employer]     Row Name 02/01/22 1400          Grooming Assessment/Training    [place] Level (Grooming) wash face, hands; dependent (less than 25% patient effort)  -JY     Assistive Devices (Grooming) hand over hand  -JY     Position (Grooming) supine  -JY     Comment (Grooming) pt too lethargic to participate in face/hand washing even with [place] A provided and washcloth placed in L hand  -JY           User Key  (r) = Recorded By, (t) = Taken By, (c) = Cosigned By    Initials Name Provider Type    Sussy Pollock OT Occupational Therapist               Obj/Interventions     Row Name  02/01/22 1409          Shoulder (Therapeutic Exercise)    Shoulder (Therapeutic Exercise) PROM (passive range of motion); AAROM (active assistive range of motion)  -     Shoulder AAROM (Therapeutic Exercise) left; flexion; extension; aBduction; aDduction; external rotation; internal rotation; horizontal aBduction/aDduction; supine; 10 repetitions  -JY     Shoulder PROM (Therapeutic Exercise) right; flexion; extension; aBduction; aDduction; external rotation; internal rotation; horizontal aBduction/aDduction; supine; 10 repetitions  -     Row Name 02/01/22 1409          Elbow/Forearm (Therapeutic Exercise)    Elbow/Forearm (Therapeutic Exercise) PROM (passive range of motion); AAROM (active assistive range of motion)  -     Elbow/Forearm AAROM (Therapeutic Exercise) left; flexion; extension; supination; pronation; supine; 10 repetitions  -     Elbow/Forearm PROM (Therapeutic Exercise) right; flexion; extension; supination; pronation; supine; 10 repetitions  -     Row Name 02/01/22 1409          Wrist (Therapeutic Exercise)    Wrist (Therapeutic Exercise) PROM (passive range of motion); AAROM (active assistive range of motion)  -     Wrist AAROM (Therapeutic Exercise) left; flexion; extension; 10 repetitions  -     Wrist PROM (Therapeutic Exercise) right; flexion; extension; 10 repetitions  -     Row Name 02/01/22 1409          Hand (Therapeutic Exercise)    Hand (Therapeutic Exercise) PROM (passive range of motion); AROM (active range of motion)  -     Row Name 02/01/22 1409          Motor Skills    Motor Skills therapeutic exercise  -     Coordination right; upper extremity; lower extremity; gross motor deficit  -     Row Name 02/01/22 1409          Balance    Balance Assessment sitting static balance  -J     Static Sitting Balance mild impairment; supported; long sitting; other (see comments)  sitting up in bed in attempt to increase arousal and facilitate position for ADLs, only tolerable  for supported sitting up in bed with back of bed support, mild lateral lean to R side  -JY     Balance Interventions sitting; static; occupation based/functional task  -JNOAH     Comment, Balance sitting up in bed in attempt to increase arousal and facilitate position for ADLs, only tolerable for supported sitting up in bed with back of bed support, mild lateral lean to R side  -Y     Row Name 02/01/22 1409          Therapeutic Exercise    Therapeutic Exercise shoulder; elbow/forearm; wrist; hand; other (see comments)  facilitated PROM at [facility] with emphasis on RUE with education to pt spouse regarding need for carryover at home, facilitated proximal to distal approach and closely monitored pt response x 10 reps each; gross PROM at [facility], AAROM LUE  -JNOAH           User Key  (r) = Recorded By, (t) = Taken By, (c) = Cosigned By    Initials Name Provider Type    Sussy Pollock, OT Occupational Therapist               Goals/Plan    No documentation.                Clinical Impression     [facility] Name 02/01/22 1411          Pain Assessment    Additional Documentation Pain Scale: FACES Pre/Post-Treatment (Group)  -[facility] Name 02/01/22 1411          Pain Scale: Numbers Pre/Post-Treatment    Pain Intervention(s) Repositioned; Ambulation/increased activity  -     Row Name 02/01/22 1411          Pain Scale: FACES Pre/Post-Treatment    Pain: FACES Scale, Pretreatment 0-->no hurt  -JNOAH     Pre/Posttreatment Pain Comment pt did not verbally indicate any pain and in close monitoring of pt's facial indicators, no pain indicated  -[facility] Name 02/01/22 1411          Plan of Care Review    Plan of Care Reviewed With patient; spouse  -EDEN     Progress no change  -JNOAH     Outcome Summary Pt very lethargic this date, only briefly with eyes opened during bed mobility with increased stimuli. REq'd max A x 2 for rolling L and scooting to HOB for pressure reduction/position change and improved support of RUE. Pt dependent for d/d gown and  washing face after [place] A attempt. Pt tolerated PROM at RUE and AAROM at E x 10 reps each, proximal to distal, no indicators of pain. Pt spouse present for education regarding need for carryover of TE for joint mobility to reduce risk for contractures, skin breadown, etc with hypertonicity still present at [facility]. Will progress pt as able while hospitalized.  -JY     Row Name 02/01/22 1411          Vital Signs    Pre Systolic BP Rehab 112  -JY     Pre Treatment Diastolic BP 55  -JY     Pretreatment Heart Rate (beats/min) 85  -JY     Posttreatment Heart Rate (beats/min) 77  -JY     Pre SpO2 (%) 92  -JY     O2 Delivery Pre Treatment room air  -JY     O2 Delivery Intra Treatment room air  -JY     Post SpO2 (%) 93  -JY     O2 Delivery Post Treatment room air  -JY     Pre Patient Position Side Lying  -JY     Intra Patient Position Supine  -JY     Post Patient Position Side Lying  -JY     Row Name 02/01/22 1411          Positioning and Restraints    Pre-Treatment Position in bed  -JY     Post Treatment Position bed  -JY     In Bed notified nsg; side lying left; call light within reach; encouraged to call for assist; exit alarm on; with family/caregiver; side rails up x2  -JY           User Key  (r) = Recorded By, (t) = Taken By, (c) = Cosigned By    Initials Name Provider Type    Sussy Pollock, OT Occupational Therapist               Outcome Measures     Row Name 02/01/22 1420          How much help from another is currently needed...    Putting on and taking off regular lower body clothing? 1  -JY     Bathing (including washing, rinsing, and drying) 1  -JY     Toileting (which includes using toilet bed pan or urinal) 1  -JY     Putting on and taking off regular upper body clothing 1  -JY     Taking care of personal grooming (such as brushing teeth) 1  -JY     Eating meals 1  -JY     AM-PAC 6 Clicks Score (OT) 6  -JY     Row Name 02/01/22 0800          How much help from another person do you currently need...     Turning from your back to your side while in flat bed without using bedrails? 2  -VL     Moving from lying on back to sitting on the side of a flat bed without bedrails? 2  -VL     Moving to and from a bed to a chair (including a wheelchair)? 1  -VL     Standing up from a chair using your arms (e.g., wheelchair, bedside chair)? 1  -VL     Climbing 3-5 steps with a railing? 1  -VL     To walk in hospital room? 1  -VL     AM-PAC 6 Clicks Score (PT) 8  -VL     Row Name 02/01/22 1420          Functional Assessment    Outcome Measure Options AM-PAC 6 Clicks Daily Activity (OT)  -EDEN           User Key  (r) = Recorded By, (t) = Taken By, (c) = Cosigned By    Initials Name Provider Type    VL Oliva Pemberton RN Registered Nurse    Sussy Pollock OT Occupational Therapist                Occupational Therapy Education                 Title: PT OT SLP Therapies (In Progress)     Topic: Occupational Therapy (In Progress)     Point: ADL training (In Progress)     Description:   Instruct learner(s) on proper safety adaptation and remediation techniques during self care or transfers.   Instruct in proper use of assistive devices.              Learning Progress Summary           Patient Acceptance, E,D, NR by EDEN at 2/1/2022 1335    Acceptance, E,TB,D, NR by AR at 1/29/2022 1432   Family Acceptance, E,D, NR by EDEN at 2/1/2022 1335                   Point: Home exercise program (In Progress)     Description:   Instruct learner(s) on appropriate technique for monitoring, assisting and/or progressing therapeutic exercises/activities.              Learning Progress Summary           Patient Acceptance, E,D, NR by EDEN at 2/1/2022 1335    Acceptance, E,TB,D, NR by AR at 1/29/2022 1432   Family Acceptance, E,D, NR by EDEN at 2/1/2022 1335                   Point: Precautions (In Progress)     Description:   Instruct learner(s) on prescribed precautions during self-care and functional transfers.              Learning Progress Summary            Patient Acceptance, E,D, NR by JY at 2/1/2022 1335    Acceptance, E,TB,D, NR by AR at 1/29/2022 1432   Family Acceptance, E,D, NR by JY at 2/1/2022 1335                   Point: Body mechanics (In Progress)     Description:   Instruct learner(s) on proper positioning and spine alignment during self-care, functional mobility activities and/or exercises.              Learning Progress Summary           Patient Acceptance, E,D, NR by JY at 2/1/2022 1335    Acceptance, E,TB,D, NR by AR at 1/29/2022 1432   Family Acceptance, E,D, NR by EDEN at 2/1/2022 1335                               User Key     Initials Effective Dates Name Provider Type Discipline    AR 06/16/21 -  Roxy Garcia, OT Occupational Therapist OT    JNOAH 06/16/21 -  Sussy Valdes OT Occupational Therapist OT              OT Recommendation and Plan     Plan of Care Review  Plan of Care Reviewed With: patient, spouse  Progress: no change  Outcome Summary: Pt very lethargic this date, only briefly with eyes opened during bed mobility with increased stimuli. REq'd max A x 2 for rolling L and scooting to HOB for pressure reduction/position change and improved support of RUE. Pt dependent for d/d gown and washing face after [place] A attempt. Pt tolerated PROM at [facility] and AAROM at E x 10 reps each, proximal to distal, no indicators of pain. Pt spouse present for education regarding need for carryover of TE for joint mobility to reduce risk for contractures, skin breadown, etc with hypertonicity still present at [facility]. Will progress pt as able while hospitalized.     Time Calculation:    Time Calculation- OT     Row Name 02/01/22 1421             Time Calculation- OT    OT Start Time 1335  -JY      OT Received On 02/01/22  -JY      OT Goal Re-Cert Due Date 02/08/22  -JY              Timed Charges    43238 - OT Therapeutic Exercise Minutes 11  -JY      05758 - OT Therapeutic Activity Minutes 8  -JY      19004 - OT Self Care/Mgmt Minutes 4  -JY               Total Minutes    Timed Charges Total Minutes 23  -JY       Total Minutes 23  -JY            User Key  (r) = Recorded By, (t) = Taken By, (c) = Cosigned By    Initials Name Provider Type    Sussy Pollock OT Occupational Therapist              Therapy Charges for Today     Code Description Service Date Service Provider Modifiers Qty    61478019119  OT THER PROC EA 15 MIN 2/1/2022 Sussy Valdes OT GO 1    [id number]  OT THERAPEUTIC ACT EA 15 MIN 2/1/2022 Sussy Valdes OT GO 1               Sussy Valdes OT  2/1/2022

## 2022-02-02 NOTE — CASE MANAGEMENT/SOCIAL WORK
Case Management Discharge Note      Final Note: I spoke with Faheem, Mrs. Espitia's spouse on the phone. Ambulance transportation has been scheduled to transport Mrs. Espitia to their niece Stephy's home tomorrow 2-3 at 10:30 am. Her address is [address] [place] [address] in [place], [place]. Faheem reports that family will be caring for Mrs. Espitia 24/7. Bolus tube feedings have been arranged through [facility] infusion. They are going to deliver supplies to the bedside this afternoon and complete teaching with Faheem. [facility] home care has also been arranged with skilled nursing, PT, OT, and SLP. I notified Radha of her scheduled ambulance home tomorrow. A hospital bed and mechanical lift have been arranged and delivered by [facility].  A referral to [facility] has been sent by  to offer primary care at home. Faheem verbalizes understanding and denies having any additional discharge needs.         Selected Continued Care - Admitted Since 1/26/2022     Destination    No services have been selected for the patient.              Durable Medical Equipment    No services have been selected for the patient.              Dialysis/Infusion Coordination complete.    Service Provider Selected Services Address Phone Fax Patient Preferred    [facility]  Infusion and IV Therapy [address] [facility] [id number] [phone] --          Home Medical Care Coordination complete.    Service Provider Selected Services Address Phone Fax Patient Preferred    [facility] [address] [facility] [id number] [phone] --          Therapy    No services have been selected for the patient.              Community Resources    No services have been selected for the patient.              [facility]    No services have been selected for the patient.                        Transportation Services  Ambulance: [facility]  Ambulance Service    Final Discharge Disposition Code: 06 - home with home health care

## 2022-02-02 NOTE — PLAN OF CARE
Goal Outcome Evaluation:  Plan of Care Reviewed With: patient        Progress: no change  Outcome Summary: Pt tolerated tube feed. No s/s pain or discomfort. NIH22.

## 2022-02-02 NOTE — PROGRESS NOTES
Stroke Progress Note       Chief Complaint:  Right-sided weakness, aphasia    Subjective    Subjective     Subjective:  Patient asleep in bed, no family at BS. Patient drowsy but does somewhat respond to verbal stimuli. No changes overnight. PEG tube in place. Patient seems to be tolerating low dose Eliquis without issue.     Review of Systems   Unable to perform ROS: Patient nonverbal            Objective    Objective      Temp:  [98 °F (36.7 °C)-98.7 °F (37.1 °C)] 98.3 °F (36.8 °C)  Heart Rate:  [77-97] 80  Resp:  [18-20] 18  BP: ()/(48-78) 119/63        Neurological Exam  Mental Status  Drowsy. Patient is nonverbal.    Cranial Nerves  CN III, IV, VI: Extraocular movements intact bilaterally. Pupils equal round and reactive to light bilaterally.  CN VII:  Right: There is central facial weakness.  Exam difficult as patient is unable to participate.    Motor   Increased muscle tone. In RUE.  Pt moves LUE and LLE spontaneously, slight movement noted in right foot  Pt does wiggle toes on LLE on command, does not follow any other commands.    Sensory  Patient nonverbal, does grimace to painful stimuli in RUE and RLE.    Coordination  Unable to assess.    Gait  Not assessed.      Physical Exam  Vitals and nursing note reviewed.   Constitutional:       General: She is not in acute distress.     Comments: drowsy   HENT:      Head: Normocephalic and atraumatic.      Mouth/Throat:      Mouth: Mucous membranes are dry.      Pharynx: Oropharynx is clear.   Eyes:      Extraocular Movements: Extraocular movements intact.      Pupils: Pupils are equal, round, and reactive to light.   Cardiovascular:      Rate and Rhythm: Normal rate and regular rhythm.   Pulmonary:      Effort: Pulmonary effort is normal. No respiratory distress.   Abdominal:      Palpations: Abdomen is soft.      Comments: PEG tube in place   Musculoskeletal:      Right lower leg: No edema.      Left lower leg: No edema.   Skin:     General: Skin is warm  and dry.   Neurological:      Cranial Nerves: Cranial nerve deficit present.      Sensory: Sensory deficit present.      Motor: Weakness present.         Results Review:    I reviewed the patient's new clinical results.    Results for orders placed during the hospital encounter of 01/20/22    Adult Transthoracic Echo Complete W/ Cont if Necessary Per Protocol (With Agitated Saline)    Interpretation Summary  · Left ventricular ejection fraction appears to be 56 - 60%. Left ventricular systolic function is normal.  · Saline test results are negative.  · Left ventricular diastolic function is consistent with (grade I) impaired relaxation.    CT Head Without Contrast    Result Date: 2/1/2022  1. Expected continued evolution of the scattered left cerebral hemisphere and corpus callosum subacute infarcts. No evidence of hemorrhagic transformation. No significant mass effect. Electronically signed by:  Marvin Mackenzie  2/1/2022 2:59 AM Mountain Time          Assessment/Plan     Assessment/Plan:  Ms. Espitia is a 56-year-old  female with known medical diagnosis of HTN, HLD, DM type II, chronic tobacco abuse, left MCA stroke (11/2020), s/p right CEA (12/2020), and recent admission to [facility] for scattered ischemic strokes in the left frontoparietal region as well as bilateral ICA occlusions who presented to [facility] ED on 1/26/2022 after being found lethargic and nonverbal at [facility]. Patient was not a candidate for IV TPA or for neurointervention. Patient had a PEG placed and was started on low dose Eliquis 2/1/2022.      1. Acute multiterritorial strokes-etiology likely hemodynamic hypoperfusion versus stump emboli from her recent left ICA. Poor collaterals on the left.    -continue Eliquis 2.5 mg BID   -continue ASA 81 mg daily   -continue Atorvastatin 80 mg daily   -PT/OT/SLP following   -Palliative following   - plans to take patient home with home health, patient seen  by [facility] liaison   -patient should follow up in the  stroke clinic in 4 weeks, if patient  continues to tolerate the low dose Eliquis without issue we will  consider full anticoagulation vs. transitioning to DAPT therapy   -no further stroke workup needed, stroke neurology will sign off  2. Hypertension   -avoid hypotension as this may cause further extension of her  stroke, goal SBP >120      Discussed plan of care with Dr. Bryson. Stroke neurology will sign off. Please call with any further questions or concerns. Thank you for allowing us to participate in the care of this patient.     LOURDES Gonzalez  02/02/22  10:24 EST

## 2022-02-02 NOTE — THERAPY TREATMENT NOTE
Patient Name: Raquel Espitia  : 1965    MRN: [id number]                              Today's Date: 2022       Admit Date: 2022    Visit Dx:     ICD-10-CM ICD-9-CM   1. Acute CVA (cerebrovascular accident) ([facility])  I63.9 434.91   2. Confusion  R41.0 298.9   3. Aphasia  R47.01 784.3   4. Cognitive communication deficit  R41.841 799.52   5. Dysphagia, unspecified type  R13.10 787.20   6. Gastroesophageal reflux disease, unspecified whether esophagitis present  K21.9 530.81   7. Acute ischemic stroke ([facility])  I63.9 434.91   8. Bilateral carotid occlusion despite previous right CEA and left ICA stents.  Reconstitutes from external carotid arteries  I65.23 433.10     433.30     Patient Active Problem List   Diagnosis   • Generalized anxiety disorder   • Uncontrolled T2DM with HbA1c 8.6   • HTN   • Chronic tobacco abuse (1.5 PPD x 40 yrs)   • Tobacco abuse counseling   • GERD   • HFpEF   • Hyperlipidemia LDL goal <70   • Thyroid nodule   • History of left cerebrovascular accident 2020 (CMS/[facility])   • Chronic, continuous use of opioids   • Bilateral carotid artery disease ([facility])   • Chronic bilateral low back pain without sciatica   • History of chest pain   • Degenerative joint disease (DJD) of lumbar spine   • Palpitations   • Acute ischemic cerebrovascular accident (CVA) ([facility])   • Bilateral carotid occlusion despite previous right CEA and left ICA stents.  Reconstitutes from external carotid arteries   • Acute ischemic stroke ([facility])   • Acute CVA (cerebrovascular accident) ([facility])   • Dysphagia     Past Medical History:   Diagnosis Date   • Acute bronchitis    • Arthritis    • Asthma    • COPD (chronic obstructive pulmonary disease) ([facility])    • Diabetes mellitus ([facility])     TWICE PER WEEK CHECKS SUGAR    • Edema    • Fibromyalgia    • GERD (gastroesophageal reflux disease)    • Headache    • History of Holter monitoring     23 days worn   • Hyperlipidemia    • Hypertension    • Impaired fasting glucose    • Low  back pain    • Migraine    • Pain of left calf    • Shingles outbreak     8-9  YEARS AGO- THINK HAD IT    • Stroke (HCC)     X2 TIA -  BOTH SIDES EFFECTED    • Wears dentures     FULL BUT DOESNT WEAR ALWAYS    • Wears glasses      Past Surgical History:   Procedure Laterality Date   • ABDOMINAL SURGERY      X2 ULCER REPAIR    • BACK SURGERY      X2 fusion lumbar   • CAROTID ENDARTERECTOMY Right 2020    Procedure: CAROTID ENDARTERECTOMY WITH EEG RIGHT;  Surgeon: Gregg Patrick MD;  Location: [facility] OR;  Service: Neurosurgery;  Laterality: Right;   •  SECTION     • CHOLECYSTECTOMY     • COLONOSCOPY     • ENDOSCOPY     • ENDOSCOPY W/ PEG TUBE PLACEMENT N/A 2022    Procedure: ESOPHAGOGASTRODUODENOSCOPY WITH PERCUTANEOUS ENDOSCOPIC GASTROSTOMY TUBE INSERTION;  Surgeon: Brunner, Mark I, MD;  [facility];  Service: Gastroenterology;  Laterality: N/A;  incision @ 921  ancef 2G given per CRNA    • HAND SURGERY      RIGHT nerve repair   • INTERVENTIONAL RADIOLOGY PROCEDURE Bilateral 2020    Procedure: CAROTID CEREBRAL ANGIOGRAM BILATERAL;  Surgeon: Gregg Patrick MD;  [facility];  Service: Interventional Radiology;  Laterality: Bilateral;   • NECK SURGERY      cervicle spine plate       General Information     Row Name 22 1200          Physical Therapy Time and Intention    Document Type therapy note (daily note)  -SC     Mode of Treatment physical therapy  -SC     Row Name 22 1200          General Information    Patient Profile Reviewed yes  -SC     Existing Precautions/Restrictions fall; other (see comments)  R spastic delores, non verbal, PEG,  -SC     Row Name 22 1200          Cognition    Orientation Status (Cognition) unable/difficult to assess  -SC     Row Name 22 1200          Safety Issues, Functional Mobility    Impairments Affecting Function (Mobility) balance; cognition; coordination; endurance/activity tolerance;  motor control; muscle tone abnormal; postural/trunk control; range of motion (ROM); strength; visual/perceptual  -SC     Cognitive Impairments, Mobility Safety/Performance insight into deficits/self-awareness; judgment; problem-solving/reasoning; awareness, need for assistance; attention  -[facility] Name 02/02/22 1200          Relationship/Environment    Name(s) of Who Lives With Patient lethergic with eyes closed. required tactile and verbal cues to arrouse, not following commands  -SC           User Key  (r) = Recorded By, (t) = Taken By, (c) = Cosigned By    Initials Name Provider Type    SC Nir Martinez, PT Physical Therapist               Mobility     Row Name 02/02/22 1202          Bed Mobility    Bed Mobility supine-sit  -SC     Supine-Sit [place] (Bed Mobility) 2 person assist; maximum assist (25% patient effort)  -SC     Sit-Supine [place] (Bed Mobility) maximum assist (25% patient effort); 2 person assist; verbal cues  -SC     Assistive Device (Bed Mobility) head of bed elevated; draw sheet; bed rails  -SC     Comment (Bed Mobility) very lethergic and slow to respond . Required more assistance with trunk and legs  -[facility] Name 02/02/22 1202          Transfers    Comment (Transfers) deferred - too lethergic  -[facility] Name 02/02/22 1202          Gait/Stairs (Locomotion)    Comment (Gait/Stairs) --  -SC           User Key  (r) = Recorded By, (t) = Taken By, (c) = Cosigned By    Initials Name Provider Type    SC Nir Martinez, PT Physical Therapist               Obj/Interventions     Row Name 02/02/22 1206          Motor Skills    Therapeutic Exercise hip  -[facility] Name 02/02/22 1206          Hip (Therapeutic Exercise)    Hip PROM (Therapeutic Exercise) flexion; extension; aBduction; aDduction; right; supine  -[facility] Name 02/02/22 1206          Balance    Comment, Balance worked on sitting balance on edge of bed . Initially pushing with L UE causing LOB to right side. Patient  required inhibition of this by removing hand placement and placing on lap. worked on fwd/badckward rocking and midline orientation. Demonstrated improvement with time using L UE correctly to stay upright and min navin  -[facility] Name 02/02/22 1206          Lower Extremity (Manual Muscle Testing)    Comment, MMT: Lower Extremity --  -SC           User Key  (r) = Recorded By, (t) = Taken By, (c) = Cosigned By    Initials Name Provider Type    SC Nir Martinez, PT Physical Therapist               Goals/Plan    No documentation.                Clinical Impression     [facility] Name 02/02/22 1208          Pain    Additional Documentation Pain Scale: FACES Pre/Post-Treatment (Group)  -[facility] Name 02/02/22 1208          Pain Scale: FACES Pre/Post-Treatment    Pain: FACES Scale, Pretreatment 0-->no hurt  -SC     Posttreatment Pain Rating 6-->hurts even more  -SC     Pain Location abdomen  -SC     Pre/Posttreatment Pain Comment rn notified  -[facility] 02/02/22 1208          Plan of Care Review    Plan of Care Reviewed With patient  -SC     Progress no change  -SC     Outcome Summary Patient lethergic today requiring much stimulation to awake. Difficulty following commands because of this. She continues to require max assist when lethergic to get up to edg of bed.  -[facility] Name 02/02/22 1208          Therapy Assessment/Plan (PT)    Patient/Family Therapy Goals Statement (PT) unable to state  -SC     Rehab Potential (PT) good, to achieve stated therapy goals  -SC     Criteria for Skilled Interventions Met (PT) yes; meets criteria; skilled treatment is necessary  -[facility] Name 02/02/22 1208          Vital Signs    Post Systolic BP Rehab 134  -SC     Post Treatment Diastolic BP 68  -SC     Posttreatment Heart Rate (beats/min) 88  -[facility] Name 02/02/22 1208          Positioning and Restraints    Pre-Treatment Position in bed  -SC     Post Treatment Position bed  -SC     In Bed notified nsg; sitting EOB; call  light within reach; encouraged to call for assist; exit alarm on  -SC           User Key  (r) = Recorded By, (t) = Taken By, (c) = Cosigned By    Initials Name Provider Type    Nir Barton PT Physical Therapist               Outcome Measures     Row Name 02/02/22 1211          How much help from another person do you currently need...    Turning from your back to your side while in flat bed without using bedrails? 1  -SC     Moving from lying on back to sitting on the side of a flat bed without bedrails? 1  -SC     Moving to and from a bed to a chair (including a wheelchair)? 1  -SC     Standing up from a chair using your arms (e.g., wheelchair, bedside chair)? 1  -SC     Climbing 3-5 steps with a railing? 1  -SC     To walk in hospital room? 1  -SC     AM-PAC 6 Clicks Score (PT) 6  -SC     Row Name 02/02/22 1211          Functional Assessment    Outcome Measure Options AM-PAC 6 Clicks Basic Mobility (PT)  -SC           User Key  (r) = Recorded By, (t) = Taken By, (c) = Cosigned By    Initials Name Provider Type    Nir Barton PT Physical Therapist                             Physical Therapy Education                 Title: PT OT SLP Therapies (In Progress)     Topic: Physical Therapy (Done)     Point: Mobility training (Done)     Learning Progress Summary           Patient Eagmarilou, JOSE, VU by SC at 2/2/2022 1211    Comment: reviewed benefits od activity    Acceptance, E,D, VU,NR by  at 1/31/2022 1624    Acceptance, E, NR by  at 1/29/2022 1504                   Point: Home exercise program (Done)     Learning Progress Summary           Patient Eager, E, VU by SC at 2/2/2022 1211    Comment: reviewed benefits od activity    Acceptance, E,D, VU,NR by  at 1/31/2022 1624                   Point: Body mechanics (Done)     Learning Progress Summary           Patient Eager, E, VU by SC at 2/2/2022 1211    Comment: reviewed benefits od activity    Acceptance, E,D, VU,NR by  at 1/31/2022 1624     Acceptance, E, NR by BA at 1/29/2022 1504                   Point: Precautions (Done)     Learning Progress Summary           Patient Eager, E, VU by SC at 2/2/2022 1211    Comment: reviewed benefits od activity    Acceptance, E,D, VU,NR by  at 1/31/2022 1624    Acceptance, E, NR by  at 1/29/2022 1504                               User Key     Initials Effective Dates Name Provider Type Discipline    SC 06/16/21 -  Nir Martinez, PT Physical Therapist PT     06/01/21 -  Teressa Nance, PT Physical Therapist PT     09/21/21 -  Kristie Coughlin, PT Physical Therapist PT              PT Recommendation and Plan     Plan of Care Reviewed With: patient  Progress: no change  Outcome Summary: Patient lethergic today requiring much stimulation to awake. Difficulty following commands because of this. She continues to require max assist when lethergic to get up to edg of bed.     Time Calculation:    PT Charges     Row Name 02/02/22 1130             Time Calculation    Start Time 1130  -SC      PT Received On 02/02/22  -SC      PT Goal Re-Cert Due Date 02/08/22  -SC              Time Calculation- PT    Total Timed Code Minutes- PT 23 minute(s)  -SC              Timed Charges    88464 - PT Therapeutic Activity Minutes 23  -SC              Total Minutes    Timed Charges Total Minutes 23  -SC       Total Minutes 23  -SC            User Key  (r) = Recorded By, (t) = Taken By, (c) = Cosigned By    Initials Name Provider Type    SC Nir Martinez, PT Physical Therapist              Therapy Charges for Today     Code Description Service Date Service Provider Modifiers Qty    [id number]  PT THERAPEUTIC ACT EA 15 MIN 2/2/2022 Nir Martinez, PT GP 2          PT G-Codes  Outcome Measure Options: AM-PAC 6 Clicks Basic Mobility (PT)  AM-PAC 6 Clicks Score (PT): 6  AM-PAC 6 Clicks Score (OT): 6  Modified Shabbir Scale: 4 - Moderately severe disability.  Unable to walk without assistance, and unable to attend to own bodily  needs without assistance.    Nir Martinez, PT  2/2/2022

## 2022-02-02 NOTE — NURSING NOTE
Daily Low Lorenzo <=14   Lorenzo score: 13  No new concerns noted per staff. Interventions in place and documented per protocol. Apply barrier cream every shift/PRN. Keep patient dry and turn q2h. Elevate and offload heels.  Contact WOC for any concerns.  On Low Air Loss mattress

## 2022-02-02 NOTE — PROGRESS NOTES
PEG site is clean, without erythema, or drainage.    Will sign off. Please call with questions or concerns.

## 2022-02-02 NOTE — PLAN OF CARE
Goal Outcome Evaluation:  Plan of Care Reviewed With: patient        Progress: no change  Outcome Summary: Patient lethergic today requiring much stimulation to awake. Difficulty following commands because of this. She continues to require max assist when lethergic to get up to edg of bed.

## 2022-02-02 NOTE — PLAN OF CARE
Goal Outcome Evaluation:  Plan of Care Reviewed With:  (nursing)        Progress: no change  Outcome Summary: Pt. appeared to be sleeping comfortably at time of visit.  No visible signs of discomfort noted.  No family at BS.  Plan for pt. to go home tomorrow at 1030 with home health and [facility] house calls.  Pt. to receive bolus tube feeds at home.  Pt. to get  Palliative  Problem: Adult Inpatient Plan of Care  Goal: Plan of Care Review  2/2/2022 1604 by Jeannette Bell RN  Outcome: Ongoing, Progressing  Flowsheets (Taken 2/2/2022 1604)  Outcome Summary: Palliative care to follow for support, POC and symptom management while inpatient.  2/2/2022 1603 by Jeannette Bell RN  Flowsheets (Taken 2/2/2022 1600)  Progress: no change  Plan of Care Reviewed With: (nursing) --  Outcome Summary: Pt. appeared to be sleeping comfortably at time of visit.  No visible signs of discomfort noted.  No family at BS.  Plan for pt. to go home tomorrow at 1030 with home health and [facility] house calls.  Pt. to receive bolus tube feeds at home.  Pt. to get  Palliative    1330, Palliative IDT meeting: ARTIE Valle RN; SHELLEY Navarrete, QUEW, ACHP-SW;  CLEM Bell RN, CHROJAS, [facility], OCN; AIMEE Go, DO; MICHAEL Anders, APRN; JODY Randall RN, CHPN; MICHAEL Smalls, LEDA, CHPN

## 2022-02-02 NOTE — PROGRESS NOTES
[facility] Medicine Services  PROGRESS NOTE    Patient Name: Raquel Espitia  : 1965  MRN: [id number]    Date of Admission: 2022  Primary Care Physician: Vikram Stewart MD    Subjective   Subjective     CC:  Encephalopathy nonverbal    HPI:  Opens eyes to name    ROS:  Unable to obtain secondary to mental status    Objective   Objective     Vital Signs:   Temp:  [98 °F (36.7 °C)-98.7 °F (37.1 °C)] 98.3 °F (36.8 °C)  Heart Rate:  [77-97] 80  Resp:  [18-20] 18  BP: ()/(48-78) 119/63     Physical Exam:  Constitutional: No acute distress, sleeping, arouses to voice  HENT: NCAT, mucous membranes dry  Respiratory: Clear to auscultation bilaterally, respiratory effort normal   Cardiovascular: RRR, no murmurs, rubs, or gallops  Gastrointestinal: Positive bowel sounds, soft, nontender, nondistended, PEG intact - no erythema or drainage at site  Musculoskeletal: No bilateral ankle edema  Psychiatric: sleeping  Neurologic: Nonverbal,opens eyes briefly to name and goes back to sleep  Skin: No rashes      Results Reviewed:  LAB RESULTS:      Lab 22   WBC 12.66*   HEMOGLOBIN 16.3*   HEMATOCRIT 47.3*   PLATELETS 229   MCV 84.6         Lab 22  0449 22  1233 22  1424 22  0802 22  0737   SODIUM 142  --  142  --  139   POTASSIUM 3.3* 3.7 3.6 3.9 3.2*   CHLORIDE 100  --  103  --  102   CO2 32.0*  --  25.0  --  24.0   ANION GAP 10.0  --  14.0  --  13.0   BUN 19  --  19  --  16   CREATININE 0.66  --  0.57  --  0.49*   GLUCOSE 213*  --  120*  --  189*   CALCIUM 9.5  --  9.0  --  9.4   MAGNESIUM 1.9  --   --   --   --                          Brief Urine Lab Results  (Last result in the past 365 days)      Color   Clarity   Blood   Leuk Est   Nitrite   Protein   CREAT   Urine HCG        22 0939 Yellow   Clear   Negative   Negative   Negative   Negative                 Microbiology Results Abnormal     Procedure Component Value -  Date/Time    Blood Culture - Blood, Hand, Right [[id number]]  (Normal) Collected: 01/26/22 1015    Lab Status: Final result Specimen: Blood from Hand, Right Updated: 01/31/22 1115     Blood Culture No growth at 5 days    Blood Culture - Blood, Arm, Right [[id number]]  (Normal) Collected: 01/26/22 1045    Lab Status: Final result Specimen: Blood from Arm, Right Updated: 01/31/22 1100     Blood Culture No growth at 5 days    COVID PRE-OP / PRE-PROCEDURE SCREENING ORDER (NO ISOLATION) - Swab, Nasopharynx [[id number]]  (Normal) Collected: 01/26/22 2225    Lab Status: Final result Specimen: Swab from Nasopharynx Updated: 01/27/22 1410    Narrative:      The following orders were created for panel order COVID PRE-OP / PRE-PROCEDURE SCREENING ORDER (NO ISOLATION) - Swab, Nasopharynx.  Procedure                               Abnormality         Status                     ---------                               -----------         ------                     COVID-19, APTIMA PANTHER...[[id number]]  Normal              Final result                 Please view results for these tests on the individual orders.    COVID-19, APTIMA PANTHER PASTOR IN-HOUSE NP/OP SWAB IN UTM/VTM/SALINE TRANSPORT MEDIA 24HR TAT - Swab, Nasopharynx [[id number]]  (Normal) Collected: 01/26/22 2225    Lab Status: Final result Specimen: Swab from Nasopharynx Updated: 01/27/22 1410     COVID19 Not Detected    Narrative:      Fact sheet for providers: https://www.fda.gov/media/513066/download     Fact sheet for patients: https://www.fda.gov/media/650019/download    Test performed by RT PCR.    Urine Culture - Urine, Urine, Catheter In/Out [[id number]]  (Normal) Collected: 01/26/22 0939    Lab Status: Final result Specimen: Urine, Catheter In/Out Updated: 01/27/22 1301     Urine Culture No growth          CT Head Without Contrast    Result Date: 2/1/2022  EXAMINATION: CT HEAD WITHOUT CONTRAST DATE: 2/1/2022 4:11 AM  INDICATION: Stroke, follow-up.  COMPARISON: Head CT  January 26, 2022. Brain MRI January 26, 2022.  TECHNIQUE:  Routine axial images through the head without contrast. Low-dose CT acquisition technique included one or more of the following options: 1. Automated exposure control, 2. Adjustment of mA and/or KV according to patient's size and/or 3. Use of iterative reconstruction. FINDINGS: Intracranial Contents: Continued evolution of the multiple origins are now subacute ischemia involving the left frontoparietal lobes, genu of the corpus callosum, and periventricular white matter. No associated hemorrhage. Mild edema without significant mass effect. No midline  shift. Chronic encephalomalacia in the right parietal and temporal occipital lobe. Additional more chronic infarcts in the lateral right frontal and parietal lobes. No extra-axial fluid collection. No hydrocephalus. Bones and Extracranial Soft Tissues: The calvarium is intact. Normal extracranial soft tissues. Mild mucosal thickening in the ethmoid air cells. Mucous retention cyst in the left maxillary sinus. The mastoid air cells are well aerated. Imaged orbits and globes are unremarkable. Distal internal carotid artery atherosclerotic calcifications.     Impression: 1. Expected continued evolution of the scattered left cerebral hemisphere and corpus callosum subacute infarcts. No evidence of hemorrhagic transformation. No significant mass effect. Electronically signed by:  Marvin Mackenzie  2/1/2022 2:59 AM Mountain Time      Results for orders placed during the hospital encounter of 01/20/22    Adult Transthoracic Echo Complete W/ Cont if Necessary Per Protocol (With Agitated Saline)    Interpretation Summary  · Left ventricular ejection fraction appears to be 56 - 60%. Left ventricular systolic function is normal.  · Saline test results are negative.  · Left ventricular diastolic function is consistent with (grade I) impaired relaxation.      I have reviewed the medications:  Scheduled Meds:apixaban, 2.5 mg,  Per PEG Tube, Q12H  aspirin, 81 mg, Per PEG Tube, Daily  atorvastatin, 80 mg, Per PEG Tube, Nightly  bisacodyl, 10 mg, Rectal, Daily  diazePAM, 2 mg, Per PEG Tube, Nightly  glipizide, 5 mg, Per PEG Tube, Q12H  HYDROcodone-acetaminophen, 1 tablet, Per PEG Tube, BID  insulin regular, 0-7 Units, Subcutaneous, Q6H  pantoprazole, 40 mg, Intravenous, Q AM  PRO-STAT, 1 packet, Per G Tube, Daily  sodium chloride, 10 mL, Intravenous, Q12H      Continuous Infusions:   PRN Meds:.•  acetaminophen  •  dextrose  •  dextrose  •  glucagon (human recombinant)  •  ipratropium-albuterol  •  lidocaine PF 1%  •  ondansetron  •  potassium chloride  •  sodium chloride    Assessment/Plan   Assessment & Plan     Active Hospital Problems    Diagnosis  POA   • Acute CVA (cerebrovascular accident) (HCC) [I63.9]  Yes   • Dysphagia [R13.10]  Unknown   • GERD [K21.9]  Unknown      Resolved Hospital Problems   No resolved problems to display.        Brief Hospital Course to date:  Raquel Espitia is a 56 y.o. femalewith history of CVAs and multiple risk factors, inoperable bilateral ICA occlusion, discharged to [facility] on 1/25/22 on DAPT/statin, found unresponsive the morning of 1/26/2022 and returned to [facility] ED.     This patient's problems and plans were partially entered by my partner and updated as appropriate by me 02/02/22.      Recurrent CVA, left sided, embolic  Recent CVAs (discharged 1/25/2022)   Bilateral carotid occlusion  Unresponsive  -2/1 CT head repeated with stable findings/ evolving strokes.   -started eliquis bid. Change ASA to 81mg daily  -continue lipitor 80mg   -Urine culture shows no growth, CXR unremarkable  -continue therapy services. Likely will not qualify to return to [facility]/ acute rehab. / family have decided to take patient home with assistance and Home health     Dysphagia  -speech following. PEG recommended and place per GI 1/31  -PEG cleared for use and TF started 1/31. Tolerating bolus feeding  currently     Hypotension on arrival  - resolved w fluids  -BP with bolus TF     HTN/ HL    -Normotensive goals: SBP >120   -currently BP normal without medications, continue to monitor and adjust as needed     DM  - A1C was 8.6 last admission.    -continue ssi and accuchecks.   -fsbs elevated  -will start glipizide bid with ssi for now. Goal not to send patient on insulin due to cost and ease of care. Adjust as needed     Chronic pain/anxiety on chronic meds  - home dose is Norco 10q6 prn and gabapentin 800q8 and diazepam 5mg tid prn.   -ON arrival reduced doses to bid to prevent withdrawal from complicating the picture- will adjust to daily as patient appears comfortable.     Hypokalemia  -- replace    Leukocytosis  -- no findings on exam. Afebrile  -- will check urine today         DVT prophylaxis:  Medical and mechanical DVT prophylaxis orders are present.       AM-PAC 6 Clicks Score (PT): 8 (02/01/22 0800)    Disposition: I expect the patient to be discharged home with  and 24 hr assistance 2/3 @ 1030    CODE STATUS:   Code Status and Medical Interventions:   Ordered at: 01/28/22 1519     Medical Intervention Limits:    NO intubation (DNI)     Level Of Support Discussed With:    Patient    Next of Kin (If No Surrogate)     Code Status (Patient has no pulse and is not breathing):    No CPR (Do Not Attempt to Resuscitate)     Medical Interventions (Patient has pulse or is breathing):    Limited Support     Comments:    disucssed with spouse Faheem Alvarez, APRN  02/02/22

## 2022-02-03 NOTE — TELEPHONE ENCOUNTER
Caller: 3f nurse      Relationship to patient: nurse     Best call back number: [phone]   New or established patient?  [x] New  [] Established    Date of discharge: 2/3/22    Facility discharged from: [facility]    Diagnosis/Symptoms: cva     Length of stay (If applicable): 8 day     Specialty Only: Did you see a [facility] health provider?    [x] Yes  [] No  If so, who? corrine

## 2022-02-03 NOTE — DISCHARGE SUMMARY
[facility] Medicine Services  DISCHARGE SUMMARY    Patient Name: Raquel Espitia  : 1965  MRN: [id number]    Date of Admission: 2022  8:16 AM  Date of Discharge:  2/3/2022  Primary Care Physician: Vikram Stewart MD    Consults     Date and Time Order Name Status Description    2022 12:33 AM Inpatient Gastroenterology Consult Completed     2022  1:47 PM Inpatient Palliative Care MD Consult Completed     2022  4:06 PM Inpatient Neurology Consult Stroke Completed     2022  8:17 AM Inpatient Neurology Consult Stroke Completed     2022 11:42 PM Inpatient Neurology Consult Stroke Completed     2022  8:04 PM Inpatient Neurology Consult Stroke Completed           Hospital Course     Presenting Problem:   Acute CVA (cerebrovascular accident) (HCC) [I63.9]    Active Hospital Problems    Diagnosis  POA   • Acute CVA (cerebrovascular accident) (HCC) [I63.9]  Yes   • Dysphagia [R13.10]  Unknown   • GERD [K21.9]  Unknown      Resolved Hospital Problems   No resolved problems to display.          Hospital Course:  Raqule Espitia is a 56 y.o. female female with history of CVAs and multiple risk factors, inoperable bilateral ICA occlusion, discharged to [facility] on 22 on DAPT/statin, found unresponsive the morning of 2022 and returned to [facility] ED.     Recurrent CVA, left sided, embolic  Recent CVAs (discharged 2022)   Bilateral carotid occlusion  Unresponsive  - CT head repeated with stable findings/ evolving strokes.   -started eliquis bid. Change ASA to 81mg daily  -continue lipitor 80mg   -/ family have decided to take patient home with assistance and Home health     Dysphagia  -speech following. PEG recommended and place per GI   -PEG cleared for use and TF started . Tolerating bolus feeding currently. She has been set up with tube feeds for home.      Hypotension on arrival  - resolved w fluids     HTN/ HL     -Normotensive goals: SBP >120   -currently BP normal without medications.      DM  - A1C was 8.6 last admission.    -continue ssi and accuchecks.   -will send patient home on her home oral regimen of metformin, jardiance and januvia.      Chronic pain/anxiety on chronic meds  - home dose is Norco 10q6 prn and gabapentin 800q8 and diazepam 5mg tid prn.   -ON arrival reduced doses to bid to prevent withdrawal from complicating the picture     Hypokalemia  -- resolved with replacement.      Leukocytosis  -- urinalysis consistent with UTI.  Urine culture sent 2/2 is growing >100K of gram pos cocci and 50 K of gram neg bacilli.   -- will start cefdinir 300 mg BID x 5 days.         Discharge Follow Up Recommendations for outpatient labs/diagnostics:   Follow up with PCP, first available hospital follow up appt.   Follow up with stroke neuro, per their recs.     Day of Discharge     HPI:   Resting comfortably in bed this morning with  at bedside. No complaints.     Review of Systems  Gen- No fevers, chills  CV- No chest pain, palpitations  Resp- No cough, dyspnea  GI- No N/V/D, abd pain    Vital Signs:   Temp:  [97.9 °F (36.6 °C)-98.3 °F (36.8 °C)] 97.9 °F (36.6 °C)  Heart Rate:  [] 113  Resp:  [18-20] 20  BP: ()/(52-75) 121/71      Physical Exam:  Constitutional: No acute distress, awake, resting in bed.   HENT: NCAT, mucous membranes dry  Respiratory: Clear to auscultation bilaterally, respiratory effort normal on room air  Cardiovascular: RRR, no murmurs, rubs, or gallops  Gastrointestinal: Positive bowel sounds, soft, nontender, nondistended, PEG intact - no erythema or drainage at site.  Musculoskeletal: No bilateral ankle edema  Psychiatric: flat affect, cooperative  Neurologic: Nonverbal,will track with eyes.    Skin: No rashes noted to exposed skin     Pertinent  and/or Most Recent Results     LAB RESULTS:      Lab 02/02/22  5919   WBC 12.66*   HEMOGLOBIN 16.3*   HEMATOCRIT 47.3*   PLATELETS  229   MCV 84.6         Lab 02/02/22  0449 01/31/22  1233 01/30/22  1424 01/29/22  0802 01/28/22  0737   SODIUM 142  --  142  --  139   POTASSIUM 3.3* 3.7 3.6 3.9 3.2*   CHLORIDE 100  --  103  --  102   CO2 32.0*  --  25.0  --  24.0   ANION GAP 10.0  --  14.0  --  13.0   BUN 19  --  19  --  16   CREATININE 0.66  --  0.57  --  0.49*   GLUCOSE 213*  --  120*  --  189*   CALCIUM 9.5  --  9.0  --  9.4   MAGNESIUM 1.9  --   --   --   --                          Brief Urine Lab Results  (Last result in the past 365 days)      Color   Clarity   Blood   Leuk Est   Nitrite   Protein   CREAT   Urine HCG        02/02/22 1217 Dark Yellow   Clear   Negative   Trace   Negative   Trace               Microbiology Results (last 10 days)     Procedure Component Value - Date/Time    Urine Culture - Urine, Urine, Clean Catch [[id number]]  (Abnormal) Collected: 02/02/22 1217    Lab Status: Preliminary result Specimen: Urine, Clean Catch Updated: 02/03/22 0845     Urine Culture 50,000 CFU/mL Gram Negative Bacilli      >100,000 CFU/mL Gram Positive Cocci    COVID PRE-OP / PRE-PROCEDURE SCREENING ORDER (NO ISOLATION) - Swab, Nasopharynx [[id number]]  (Normal) Collected: 01/26/22 2225    Lab Status: Final result Specimen: Swab from Nasopharynx Updated: 01/27/22 1410    Narrative:      The following orders were created for panel order COVID PRE-OP / PRE-PROCEDURE SCREENING ORDER (NO ISOLATION) - Swab, Nasopharynx.  Procedure                               Abnormality         Status                     ---------                               -----------         ------                     COVID-19, APTIMA PANTHER...[[id number]]  Normal              Final result                 Please view results for these tests on the individual orders.    COVID-19, APTIMA PANTHER PASTOR IN-HOUSE NP/OP SWAB IN UTM/VTM/SALINE TRANSPORT MEDIA 24HR TAT - Swab, Nasopharynx [[id number]]  (Normal) Collected: 01/26/22 2225    Lab Status: Final result Specimen: Swab from  Nasopharynx Updated: 01/27/22 1410     COVID19 Not Detected    Narrative:      Fact sheet for providers: https://www.fda.gov/media/651091/download     Fact sheet for patients: https://www.fda.gov/media/946694/download    Test performed by RT PCR.    Blood Culture - Blood, Arm, Right [[id number]]  (Normal) Collected: 01/26/22 1045    Lab Status: Final result Specimen: Blood from Arm, Right Updated: 01/31/22 1100     Blood Culture No growth at 5 days    Blood Culture - Blood, Hand, Right [[id number]]  (Normal) Collected: 01/26/22 1015    Lab Status: Final result Specimen: Blood from Hand, Right Updated: 01/31/22 1115     Blood Culture No growth at 5 days    Urine Culture - Urine, Urine, Catheter In/Out [[id number]]  (Normal) Collected: 01/26/22 0939    Lab Status: Final result Specimen: Urine, Catheter In/Out Updated: 01/27/22 1301     Urine Culture No growth          EEG    Result Date: 1/26/2022  Reason for referral: 56 y.o.female with altered mental status Technical Summary:  A 19 channel digital EEG was performed using the international 10-20 placement system, including eye leads and EKG leads. Duration: 21 minutes Video: On Findings: The patient is awake at the beginning of the study.  Medium amplitude 4-6 Hz intermixed delta and theta activity seen over the right hemisphere, rest slower 2-5 Hz intermixed delta and theta activity is seen over the left hemisphere.  EMG artifact is noted anteriorly.  Later in the study, light sleep is seen with minor slowing of the background.  Photic stimulation does not elicit abnormality.  Hyperventilation is not performed.  No epileptiform activity or electrographic seizures are seen. Technical quality: Excellent EKG: Regular, approximately 70 bpm SUMMARY: Moderate generalized slow, greater over the left hemisphere No focal features or epileptiform activity are seen     This study shows evidence for diffuse cerebral dysfunction affecting the left hemisphere more so No evidence for  epilepsy is seen This report is transcribed using the Dragon dictation system.      CT Head Without Contrast    Result Date: 2/1/2022  EXAMINATION: CT HEAD WITHOUT CONTRAST DATE: 2/1/2022 4:11 AM  INDICATION: Stroke, follow-up.  COMPARISON: Head CT January 26, 2022. Brain MRI January 26, 2022.  TECHNIQUE:  Routine axial images through the head without contrast. Low-dose CT acquisition technique included one or more of the following options: 1. Automated exposure control, 2. Adjustment of mA and/or KV according to patient's size and/or 3. Use of iterative reconstruction. FINDINGS: Intracranial Contents: Continued evolution of the multiple origins are now subacute ischemia involving the left frontoparietal lobes, genu of the corpus callosum, and periventricular white matter. No associated hemorrhage. Mild edema without significant mass effect. No midline  shift. Chronic encephalomalacia in the right parietal and temporal occipital lobe. Additional more chronic infarcts in the lateral right frontal and parietal lobes. No extra-axial fluid collection. No hydrocephalus. Bones and Extracranial Soft Tissues: The calvarium is intact. Normal extracranial soft tissues. Mild mucosal thickening in the ethmoid air cells. Mucous retention cyst in the left maxillary sinus. The mastoid air cells are well aerated. Imaged orbits and globes are unremarkable. Distal internal carotid artery atherosclerotic calcifications.     1. Expected continued evolution of the scattered left cerebral hemisphere and corpus callosum subacute infarcts. No evidence of hemorrhagic transformation. No significant mass effect. Electronically signed by:  Marvin Mackenzie  2/1/2022 2:59 AM Mountain Time    CT Angiogram Neck    Result Date: 1/26/2022  EXAMINATION: CT ANGIOGRAM HEAD W AI ANALYSIS OF LVO-, CT ANGIOGRAM NECK-   INDICATION: Stroke, follow up  TECHNIQUE: Axial IV arterial phase contrast-enhanced CT angiogram of the head and neck. Three-dimensional  reconstructions were postprocessed.  The radiation dose reduction device was turned on for each scan per the ALARA (As Low as Reasonably Achievable) protocol.  AI analysis of LVO was utilized.  COMPARISON: 1/20/2022  FINDINGS: The lung apices are grossly clear. Evaluation of the neck soft tissues demonstrates no pathologic cervical lymphadenopathy or unexpected soft tissue neck mass. The osseous structures demonstrates no evidence of fracture, with prior ACDF again noted. Patent aortic arch, similar to comparison. There is redemonstrated complete occlusion of bilateral common and internal carotid arteries similar to prior. The vertebral arteries are unchanged, mildly diminutive on the right, otherwise patent. Intracranially, there is redemonstrated reconstitution of the right ophthalmic segment ICA and distal petrous segment on the left. The anterior cerebral arteries are normal in course and caliber. There is some mild to moderate multifocal narrowing of bilateral middle cerebral arteries, otherwise without evidence of new large vessel occlusion or focal high-grade flow limiting stenosis otherwise. There is a relative paucity of arborization in the left posterior MCA territory distally. There is unchanged mild diminution of the distal right vertebral artery. The vertebral basilar system is otherwise normal without evidence of additional high-grade flow limiting stenosis or large vessel occlusion. The right posterior communicating artery remains patent.      Overall similar CT angiogram from recent comparison demonstrating bilateral common and internal carotid artery occlusion, with reconstitution of the intracranial ICAs as above. The right posterior communicating artery remains patent and there is no additional new high-grade flow limiting stenosis or large vessel occlusion involving the branching [place] vessels.   This report was finalized on 1/26/2022 9:09 AM by Elias Oliveira.      MRI Brain Without  Contrast    Result Date: 1/26/2022  EXAMINATION: MRI BRAIN WO CONTRAST-  INDICATION: Stroke, follow up; I63.9-Cerebral infarction, unspecified; R41.0-Disorientation, unspecified; R47.01-Aphasia  TECHNIQUE: Multiplanar, multisequence MR imaging of the brain without IV contrast.  COMPARISON: Same-day CTA head and neck and CT cerebral perfusion, brain MRI 1/21/2022  FINDINGS:  Exam is somewhat limited owing to motion degradation of multiple sequences.  Multiple new scattered regions of acute infarction involving the left corona radiata, left anterior limb of the internal capsule, left external capsule, subcortical white matter of the left frontal lobe, left superior temporal lobe, and left body and genu of the corpus callosum, superimposed on recently noted regions of acute scattered infarcts of the left frontoparietal cortex and white matter from prior brain MRI.  No acute intracranial hemorrhage. Redemonstration of chronic right occipital lobe infarct. No mass effect. Normal size and configuration of the ventricles. No extra-axial collections. The midline structures appear normal. Normal appearance of the orbits. Scattered paranasal sinus disease. Small right mastoid air cell fluid. No acute or suspicious bony findings.       Multiple new scattered regions of acute infarction involving the left corona radiata, left anterior limb of the internal capsule, left external capsule, subcortical white matter of the left frontal lobe, left superior temporal lobe, and left body and genu of the corpus callosum, superimposed on recently noted regions of acute scattered infarcts of the left frontoparietal cortex and white matter from prior brain MRI.  Findings discussed with stroke navigator Kay Oliveros by Dr. Griffin via telephone at 11:05 AM 1/26/2022.  This report was finalized on 1/26/2022 11:11 AM by Pepe Griffin MD.      XR Chest 1 View    Result Date: 1/26/2022  EXAMINATION: XR CHEST 1 VW-  INDICATION: Acute Stroke  Protocol (onset < 12 hrs); I63.9-Cerebral infarction, unspecified; R41.0-Disorientation, unspecified; R47.01-Aphasia  TECHNIQUE: Frontal view of the chest  COMPARISON: 1/20/2022  FINDINGS:  Normal cardiomediastinal silhouette. Lungs are clear without focal consolidation. No pleural effusion. No pneumothorax. Redemonstration of cervical spine ACDF hardware and left neck vascular stent. No acute osseous findings. Left rotator cuff calcific tendinosis.       No acute cardiopulmonary findings.  This report was finalized on 1/26/2022 9:33 AM by Pepe Griffin MD.      CT Head Without Contrast Stroke Protocol    Result Date: 1/26/2022   DATE OF EXAM: 1/26/2022 8:15 AM  PROCEDURE: CT HEAD WO CONTRAST STROKE PROTOCOL-  INDICATIONS: Neuro deficit, acute, stroke suspected  COMPARISON: January 21, 2022  TECHNIQUE: Routine transaxial and coronal reconstruction images were obtained through the head without the administration of contrast. Automated exposure control and iterative reconstruction methods were used.  FINDINGS: There are areas of hypodensity involving the left cerebral hemisphere with some interval evolution since prior study which could reflect sequela to prior infarct. There additionally is hypodensity in the right parietal occipital area consistent with previous insult. There is no underlying hemorrhage. There are no abnormal extra-axial fluid collections. There is a mucous retention cyst or polyp within left maxillary sinus.      IMPRESSION : 1.  Evolution of previously noted area of infarction involving the left cerebral hemisphere. 2.  Old right parietal occipital insult  CT perfusion/MRI could be helpful to reassess this patient.  This report was finalized on 1/26/2022 8:46 AM by Richar Rose MD.      XR Abdomen KUB    Result Date: 1/27/2022  Single view abdomen. DATE: 1/27/2022. COMPARISON: 1/26/2022. CLINICAL HISTORY: Feeding tube placement.     There is a feeding tube with the tip in the right midabdomen  which is likely within the region of the first portion the duodenum or duodenal bulb given its course. The bowel gas pattern is otherwise unchanged. Electronically signed by:  Seamus Ruiz D.O.  1/27/2022 12:44 AM Mountain Time    XR Abdomen KUB    Result Date: 1/26/2022  DATE OF EXAM: 1/26/2022 9:58 PM  PROCEDURE: XR ABDOMEN KUB-  INDICATIONS: feeding tube placement; I63.9-Cerebral infarction, unspecified; R41.0-Disorientation, unspecified; R47.01-Aphasia; R41.841-Cognitive communication deficit; R13.10-Dysphagia, unspecified  COMPARISON: 01/26/2022 at 9:28 AM  TECHNIQUE: Single radiographic view of the abdomen was obtained.  FINDINGS: There is been interval placement of a Dobbhoff feeding tube which is looped in the stomach with the tip near the pylorus of stomach.  Bowel gas pattern is within normal limits.       1.  Feeding tube in place looped in the stomach with the tip near the pylorus of the stomach.  This report was finalized on 1/26/2022 10:39 PM by Seamus Amezcua MD.      XR Abdomen KUB    Result Date: 1/26/2022  EXAMINATION: XR ABDOMEN KUB-  INDICATION: mri; I63.9-Cerebral infarction, unspecified; R41.0-Disorientation, unspecified; R47.01-Aphasia  TECHNIQUE: 2 frontal views of the supine abdomen  COMPARISON: NONE  FINDINGS:  Excreted contrast within the bilateral renal collecting systems and bladder. Right upper quadrant cholecystectomy clips. L4-5 interbody fusion hardware redemonstrated. No unexplained radiopaque foreign bodies. Nonobstructive, nonspecific bowel gas pattern. No evidence of pneumoperitoneum although supine radiographs are insensitive for this finding. No acute osseous findings. Calcification adjacent to the left greater trochanter may reflect gluteal tendon calcific tendinosis.       No evidence of metallic foreign bodies. Redemonstration of L4-5 interbody fusion hardware and surgical clips.  This report was finalized on 1/26/2022 9:46 AM by Pepe Griffin MD.      CT Angiogram Head  w AI Analysis of LVO    Result Date: 1/26/2022  EXAMINATION: CT ANGIOGRAM HEAD W AI ANALYSIS OF LVO-, CT ANGIOGRAM NECK-   INDICATION: Stroke, follow up  TECHNIQUE: Axial IV arterial phase contrast-enhanced CT angiogram of the head and neck. Three-dimensional reconstructions were postprocessed.  The radiation dose reduction device was turned on for each scan per the ALARA (As Low as Reasonably Achievable) protocol.  AI analysis of LVO was utilized.  COMPARISON: 1/20/2022  FINDINGS: The lung apices are grossly clear. Evaluation of the neck soft tissues demonstrates no pathologic cervical lymphadenopathy or unexpected soft tissue neck mass. The osseous structures demonstrates no evidence of fracture, with prior ACDF again noted. Patent aortic arch, similar to comparison. There is redemonstrated complete occlusion of bilateral common and internal carotid arteries similar to prior. The vertebral arteries are unchanged, mildly diminutive on the right, otherwise patent. Intracranially, there is redemonstrated reconstitution of the right ophthalmic segment ICA and distal petrous segment on the left. The anterior cerebral arteries are normal in course and caliber. There is some mild to moderate multifocal narrowing of bilateral middle cerebral arteries, otherwise without evidence of new large vessel occlusion or focal high-grade flow limiting stenosis otherwise. There is a relative paucity of arborization in the left posterior MCA territory distally. There is unchanged mild diminution of the distal right vertebral artery. The vertebral basilar system is otherwise normal without evidence of additional high-grade flow limiting stenosis or large vessel occlusion. The right posterior communicating artery remains patent.      Overall similar CT angiogram from recent comparison demonstrating bilateral common and internal carotid artery occlusion, with reconstitution of the intracranial ICAs as above. The right posterior  communicating artery remains patent and there is no additional new high-grade flow limiting stenosis or large vessel occlusion involving the branching [place] of [place] vessels.   This report was finalized on 1/26/2022 9:09 AM by Elias Oliveira.      CT CEREBRAL PERFUSION WITH & WITHOUT CONTRAST    Result Date: 1/26/2022  EXAMINATION: CT CEREBRAL PERFUSION W WO CONTRAST-  INDICATION: Neuro deficit, acute, stroke suspected  TECHNIQUE: Cerebral perfusion analysis was performed using computed tomography with contrast administration, including post processing of parametric maps with determination of cerebral blood flow, cerebral blood volume, and mean transit time.  The radiation dose reduction device was turned on for each scan per the ALARA (As Low as Reasonably Achievable) protocol.  COMPARISON: Noncontrast CT head performed concurrently, CT perfusion 1/20/2022  FINDINGS: Overall similar perfusion pattern from recent comparison, complicated by bilateral ICA occlusion. There is evidence of small core left MCA territory infarct with large surrounding area of ischemic tissue at risk, with prolonged Tmax. There is also evidence of diffuse hypoperfusion in the right cerebral hemisphere, somewhat less severe and with a redemonstrated core infarct identified in the right parietal lobe.      Similar findings from recent CT perfusion exam demonstrating combination of findings likely related to chronic bilateral ICA occlusion, with superimposed areas of evolving core infarct in the right parietal lobe and small core infarct in the left MCA territory. There is evidence of a pronounced surrounding area of ischemic tissue at risk in the left MCA territory, in addition to less severe diffuse hypoperfusion in the right cerebral hemisphere likely related to chronic ICA occlusion.  This report was finalized on 1/26/2022 8:56 AM by Elias Oliveira.        Results for orders placed during the hospital encounter of 01/20/22    Duplex  Venous Lower Extremity - Bilateral CAR    Interpretation Summary  · Normal bilateral lower extremity venous duplex scan.      Results for orders placed during the hospital encounter of 01/20/22    Duplex Venous Lower Extremity - Bilateral CAR    Interpretation Summary  · Normal bilateral lower extremity venous duplex scan.      Results for orders placed during the hospital encounter of 01/20/22    Adult Transthoracic Echo Complete W/ Cont if Necessary Per Protocol (With Agitated Saline)    Interpretation Summary  · Left ventricular ejection fraction appears to be 56 - 60%. Left ventricular systolic function is normal.  · Saline test results are negative.  · Left ventricular diastolic function is consistent with (grade I) impaired relaxation.      Plan for Follow-up of Pending Labs/Results:   Pending Labs     Order Current Status    Urine Culture - Urine, Urine, Clean Catch Preliminary result        Discharge Details        Discharge Medications      New Medications      Instructions Start Date   apixaban 2.5 MG tablet tablet  Commonly known as: ELIQUIS   2.5 mg, Per PEG Tube, Every 12 Hours Scheduled      cefdinir 300 MG capsule  Commonly known as: OMNICEF   300 mg, Oral, Every 12 Hours Scheduled      HYDROcodone-acetaminophen  MG per tablet  Commonly known as: NORCO   1 tablet, Oral, 2 Times Daily      lansoprazole 3 mg/mL in sodium bicarbonate injection 8.4%   30 mg, Oral, Daily      PHARMACY MEDS TO BED CONSULT   Does not apply, Daily         Changes to Medications      Instructions Start Date   diazePAM 2 MG tablet  Commonly known as: VALIUM  What changed:   medication strength  how much to take  how to take this  when to take this  reasons to take this  additional instructions   2 mg, Per G Tube, Nightly         Continue These Medications      Instructions Start Date   acetaminophen 325 MG tablet  Commonly known as: TYLENOL   650 mg, Oral, Every 6 Hours PRN      albuterol sulfate  (90 Base) MCG/ACT  inhaler  Commonly known as: PROVENTIL HFA;VENTOLIN HFA;PROAIR HFA   2 puffs, Inhalation, Every 4 Hours PRN      ammonium lactate 12 % cream  Commonly known as: AMLACTIN   1 application, Topical, 2 Times Daily, Apply to foot       aspirin 81 MG chewable tablet   81 mg, Oral, Daily      atorvastatin 80 MG tablet  Commonly known as: LIPITOR   80 mg, Oral, Nightly      calcium carbonate 500 MG chewable tablet  Commonly known as: TUMS   2 tablets, Oral, Daily      empagliflozin 25 MG tablet tablet  Commonly known as: JARDIANCE   25 mg, Oral, Daily      metFORMIN 1000 MG tablet  Commonly known as: GLUCOPHAGE   1,000 mg, Oral, 2 Times Daily With Meals      SITagliptin 100 MG tablet  Commonly known as: JANUVIA   100 mg, Oral, Daily         Stop These Medications    baclofen 20 MG tablet  Commonly known as: LIORESAL     bisacodyl 10 MG suppository  Commonly known as: DULCOLAX     bisacodyl 5 MG EC tablet  Commonly known as: DULCOLAX     clopidogrel 75 MG tablet  Commonly known as: PLAVIX     docusate sodium 100 MG capsule     gabapentin 800 MG tablet  Commonly known as: NEURONTIN     metoprolol tartrate 25 MG tablet  Commonly known as: LOPRESSOR     nicotine 21 MG/24HR patch  Commonly known as: Nicotine Step 1     oxyCODONE 5 MG immediate release tablet  Commonly known as: ROXICODONE     pantoprazole 40 MG EC tablet  Commonly known as: PROTONIX     polyethylene glycol 17 g packet  Commonly known as: MIRALAX     sennosides-docusate 8.6-50 MG per tablet  Commonly known as: PERICOLACE            No Known Allergies      Discharge Disposition:  Home-Health Care c    Diet:  Hospital:  Diet Order   Procedures   • NPO Diet       Activity:  Activity Instructions     Activity as Tolerated               CODE STATUS:    Code Status and Medical Interventions:   Ordered at: 01/28/22 1519     Medical Intervention Limits:    NO intubation (DNI)     Level Of Support Discussed With:    Patient    Next of Kin (If No Surrogate)     Code  Status (Patient has no pulse and is not breathing):    No CPR (Do Not Attempt to Resuscitate)     Medical Interventions (Patient has pulse or is breathing):    Limited Support     Comments:    disucssed with spouse Faheem Espitia       Future Appointments   Date Time Provider Department Center   2/3/2022 To Be Determined Kena Iniguez RN HH PASTOR HC None   2/8/2022 10:15 AM CLASSROOM 1 [facility] PASTOR [facility] PASTOR   7/14/2022  1:45 PM Mindy Aguilar MD E C PASTOR PASTOR       Additional Instructions for the Follow-ups that You Need to Schedule     Ambulatory Referral to Home Health   As directed      Face to Face Visit Date: 1/31/2022    Follow-up provider for Plan of Care?: I treated the patient in an acute care facility and will not continue treatment after discharge.    Follow-up provider: MINDY STEWART [[id number]]    Reason/Clinical Findings: CVA, Bilateral carotid occlusion    Describe mobility limitations that make leaving home difficult: bed bound due to multiple strokes    Nursing/Therapeutic Services Requested: Skilled Nursing Physical Therapy Occupational Therapy    Skilled nursing orders: Medication education (skin assessment and caregiver education) Wound care dressing/changes Infusion therapy Other    PT orders: Therapeutic exercise Transfer training Strengthening Home safety assessment    Occupational orders: Activities of daily living Cognition Home safety assessment    Frequency: 1 Week 1         Discharge Follow-up with PCP   As directed       Currently Documented PCP:    Mindy Stewart MD    PCP Phone Number:    [phone]     Follow Up Details: first available hospital follow up appt.         Discharge Follow-up with Specialty: stroke neuro per their recs   As directed      Specialty: stroke neuro per their recs                     LOURDES Contreras  02/03/22      Time Spent on Discharge:  I spent  40  minutes on this discharge activity which included: face-to-face encounter with the  patient, reviewing the data in the system, coordination of the care with the nursing staff as well as consultants, documentation, and entering orders.

## 2022-02-03 NOTE — SIGNIFICANT NOTE
Patient bed alarm was alarming and staff ran to room. Found patient in floor beside bed. No apparent injury. Returned to bed with lift. Dr. Fernandez notified via secure chat. No new orders. Spouse also notified via telephone.

## 2022-02-03 NOTE — PLAN OF CARE
Goal Outcome Evaluation:  Plan of Care Reviewed With:  (nursing)        Progress: no change  Outcome Summary: Pt. appeared to be sleeping in bed.  No visible signs of discomfort present.  Tolerating TF per chart review.  No family at BS.  Palliative care to follow for support, POC and symptom management.     Plan for pt. To d/c via ambulance today at 1030 home.  Pt. To have [facility] house calls.

## 2022-02-03 NOTE — PLAN OF CARE
Patient resting comfortably this shift. Tolerated bolus tube feed. No residual prior to HS feed. Vital signs stable. Plan for discharge today with family and home health.

## 2022-02-03 NOTE — OUTREACH NOTE
Prep Survey      Responses   [facility] patient discharged from? [place]   Is LACE score < 7 ? No   Emergency Room discharge w/ pulse ox? No   Eligibility [facility]   Date of Admission 01/26/22   Date of Discharge 02/03/22   Discharge Disposition Home-Health Care Sv   Discharge diagnosis Acute CVA (cerebrovascular accident   Does the patient have one of the following disease processes/diagnoses(primary or secondary)? Stroke (TIA)   Does the patient have Home health ordered? Yes   What is the [facility] health agency?  [facility]   Is there a DME ordered? No   Prep survey completed? Yes          Rebecca Tierney RN

## 2022-02-04 NOTE — TELEPHONE ENCOUNTER
PHONE CALL FROM [facility].  NEED PHYSICAL THERAPY EXTENSION TO INCLUDE NEXT WEEK, UNABLE TO VISIT TODAY DUE TO WEATHER.      PLEASE CALL MIKE @ [phone]

## 2022-02-04 NOTE — OUTREACH NOTE
Call Center TCM Note      Responses   [facility] patient discharged from? [place]   Does the patient have one of the following disease processes/diagnoses(primary or secondary)? Stroke (TIA)   TCM attempt successful? Yes  [Verbal release is over a year old]   Call start time 1136   Call end time 1149   Discharge diagnosis Acute CVA (cerebrovascular accident)   Person spoke with today (if not patient) and relationship Son Faheem Espitia (patient can't talk or move) and Stephy-sister    Meds reviewed with patient/caregiver? Yes   Is the patient having any side effects they believe may be caused by any medication additions or changes? No   Does the patient have all medications ordered at discharge? Yes   Is the patient taking all medications as directed (includes completed medication regime)? Yes   Does the patient have a primary care provider?  Yes   Does the patient have an appointment with their PCP within 7 days of discharge? No   Comments regarding PCP Hospital d/c f/u appt is on 2/11/22 at 3:00 pm however family would like to know if this can be made a video visit as patient cannot come into the office.    What is preventing the patient from scheduling follow up appointments within 7 days of discharge? Earlier appointment not available   Nursing Interventions Verified appointment date/time/provider   Has the patient kept scheduled appointments due by today? N/A   What is the Home health agency?  [facility]   Has home health visited the patient within 72 hours of discharge? Yes   Psychosocial issues? No   Does the patient require any assistance with activities of daily living such as eating, bathing, dressing, walking, etc.? Yes   ADL comments Cannot walk or talk   Does the patient have any residual symptoms from stroke/TIA? Yes   Does the patient understand the diet ordered at discharge? No  [Tube feeds]   Did the patient receive a copy of their discharge instructions? Yes   Nursing interventions  Reviewed instructions with patient   What is the patient's perception of their health status since discharge? Improving   Nursing interventions Nurse provided patient education   Is the patient able to teach back FAST for Stroke? Yes   Is the patient/caregiver able to teach back the risk factors for a stroke? History of TIAs,  History of Afib,  Diabetes,  Smoking,  High blood pressure-goal below 120/80   Is the patient/caregiver able to teach back signs and symptoms related to disease process for when to call PCP? Yes   Is the patient/caregiver able to teach back signs and symptoms related to disease process for when to call 911? Yes   If the patient is a current smoker, are they able to teach back resources for cessation? Not a smoker  [Pt was a smoker, but is not able to now]   Is the patient/caregiver able to teach back the hierarchy of who to call/visit for symptoms/problems? PCP, Specialist, Home health nurse, Urgent Care, ED, 911 Yes   TCM call completed? Yes          Jennifer Cyr RN    2/4/2022, 11:51 EST

## 2022-02-04 NOTE — TELEPHONE ENCOUNTER
MIKE WITH [facility] CALLED STATING THEY WERE UNABLE TO GO TO THE PATIENTS HOUSE DUE TO THE WEATHER.    THEY NEED VERBAL AUTH TO GO NEXT WEEK. PLEASE ADVISE [phone]  
Verbal given  
Detail Level: Generalized
Detail Level: Detailed

## 2022-02-04 NOTE — HOME HEALTH
Patient is a 66yr old  female. DC'd from [facility] on 2.3.22 after admission for CVA. right side hemiplegia. other pmh: htn, hld, dm, aphasia, dysphagia, carotid occlusion bilateral, s/p cva. pcp Dr Stewart. referral sent to [facility]. Patient has peg tube and is receiving TF'S q4hrs with 120cc's free water flush with every bolus. Patient is living at niece's home in [place]. Pt has hospital bed, geoff lift. sn foc: TF education and teaching, fall prevention, medication management, disease process of CVA. SN, PT, OT, ST ordered.

## 2022-02-04 NOTE — PROGRESS NOTES
She was sent home from the hospital yesterday on cefdinir and it appears the UTI is susceptible to this so we should be fine.  No changes necessary at this time.

## 2022-02-08 NOTE — CONSULTS
"Ms. Raquel Espitia was present,along with her caretaker Stephy Ortiz at a follow up outpatient diabetes management education class via phone call-30 minute class. Per Ms. Ortiz, Ms. Espitia is aphasic,with right side hemiplegia, and a G tube for nutrition. Ms. Espitia did listen to education, but due to aphasia, Ms. Ortiz was the main participant.   Educated on basic diabetes pathophysiology and how related to increased risk for complications, specifically stroke. Discussed s/sx of hyperglycemia and hypoglycemia and self management of both. Stressed importance of ongoing, lifelong follow up with PCP for diabetes and other chronic health condition management. Ms. Ortiz states APRN to make home visits. Stressed importance of taking all medications as prescribed. Ms. Ortiz has been crushing pills and giving via G tube as instructed per home health nurse. Ms. Aj is receiving all nutrition per G tube at this time.Discussed importance of good skin care including feet.   Class educational materials were mailed to patient's home: [facility]'s \"Diabetes Basics\" booklet, as well as our contact information for any further questions or needs.    "

## 2022-02-09 NOTE — HOME HEALTH
Pt is a 55 yo female dx w/ CVA w/ Right hemiparesis, dysphagia, PEG, GERD, inoperable bilateral ICA occlusion, DM, chronic pain, anxiety, UTI. Discharged home to spouse and family care. Pt staying w/ niece in a single level home w/ 1 step to enter. Hospital bed, geoff lift, w/c in the home and pt also has BSC, TTB, RW and cane at her home in [place]. Pt's spouse, dtr & son are supportive and assist cg'er. Pt is non verbal, poor visual skills, follows 1 step commands- inconsistent, but improved w/ tactile cues for movement facilitation- L AROM,  rolling to L side for pressure relief and positioning with extra pillow for jt positioning. Pt sat on EOB w/ mod A and cgA to maintain midline. CG ed on PROM of UE, sensory awareness,encourage look to right to locate R arm before rolling.

## 2022-02-14 NOTE — PROGRESS NOTES
Subjective   Raquel Espitia is a 56 y.o. female   Caregivers/family request telemedicine visit and consents to same using audiovisual aid.    Chief Complaint    Stroke  Significant cognitive impairment  Dysphagia with tube feeding  Chronic anxiety and chronic pain  Diabetes mellitus  Hypertension    History of Present Illness  Patient presents via telemedicine visit accompanied by her current caregiver with no she is residing.  Patient was hospitalized at [facility] January 26 through February 3 after a prior admission for stroke.  She had been discharged to [facility] and had a subsequent stroke.  She has bilateral inoperable internal carotid artery occlusions.  She has multiple longstanding medical problems including hypertension, diabetes mellitus anxiety and chronic back pain.  She is basically nonverbal but does seem to understand the spoken voice and is able to follow some commands.  She is limited with motor function also.  Her prognosis is quite poor.  She is currently residing with a friend in [facility] [facility] away from her regular family because they were unable to care for her.    The following portions of the patient's history were reviewed and updated as appropriate: allergies, current medications, past social history and problem list    Review of Systems   Constitutional: Negative for chills and fever.   Respiratory: Positive for cough and wheezing.    Musculoskeletal: Positive for back pain.   Neurological: Positive for weakness.     Review of systems is obtained from caregiver as patient is nonverbal.  Objective     There were no vitals filed for this visit.    Physical Exam  Constitutional:       Appearance: She is ill-appearing.   Pulmonary:      Effort: Pulmonary effort is normal. No respiratory distress.   Psychiatric:      Comments: Patient did respond to my voice over the phone with some groaning but otherwise no comprehensible conversation was able to be carried out.          Assessment/Plan   Problems Addressed this Visit        Cardiac and Vasculature    HTN (Chronic)       Gastrointestinal Abdominal     Dysphagia       Mental Health    Chronic, continuous use of opioids (Chronic)    Relevant Medications    HYDROcodone-acetaminophen (NORCO)  MG per tablet       Neuro    History of left cerebrovascular accident 11/2020 (CMS/[facility]) - Primary      Other Visit Diagnoses     Type 2 diabetes mellitus without complication, without long-term current use of insulin ([facility])        G tube feedings ([facility])          Diagnoses       Codes Comments    Cerebrovascular accident (CVA), unspecified mechanism ([facility])    -  Primary ICD-10-CM: I63.9  ICD-9-CM: 434.91     Chronic, continuous use of opioids     ICD-10-CM: F11.90  ICD-9-CM: 305.51     Type 2 diabetes mellitus without complication, without long-term current use of insulin ([facility])     ICD-10-CM: E11.9  ICD-9-CM: 250.00     Benign essential hypertension     ICD-10-CM: I10  ICD-9-CM: 401.1     Dysphagia, unspecified type     ICD-10-CM: R13.10  ICD-9-CM: 787.20     G tube feedings ([facility])     ICD-10-CM: Z93.1  ICD-9-CM: V44.1         No changes in medications recommended  Refill sent in for pain medication.  Continue home health.  Prognosis very poor.    I spent 35 minutes in patient care: Reviewing records prior to the visit, examining the patient, entering orders and documentation    Part of this note may be an electronic transcription/translation of spoken language to printed text using the Dragon Dictation System.

## 2022-02-14 NOTE — OUTREACH NOTE
Stroke Week 2 Survey      Responses   [facility] patient discharged from? [place]   Does the patient have one of the following disease processes/diagnoses(primary or secondary)? Stroke (TIA)   Week 2 attempt successful? No   Unsuccessful attempts Attempt 1          Armida Majano RN

## 2022-02-14 NOTE — TELEPHONE ENCOUNTER
Caller: Luba Ortiz    Relationship: Emergency Contact    Best call back number: [phone]    Equipment requested: DAVEY FREEMANETISMILIND NUNN    Reason for the request: PATIENT WAS SENT HOME FROM THE HOSPITAL WITH THIS AND LUBA STATED THE PATIENT'S NURSE INFORMED THEM THEY NEED TO CONTACT DR. BROOKE TO RECEIVE MORE OF THIS FEEDING TUBE FORMULA

## 2022-02-15 NOTE — HOME HEALTH
Called and spoke to patient's niece, Stephy.  She said that patient and her spouse, Faheem, were living in [place] but have now moved in with her in [place], [place], since patient had the stroke and is total care.  Stephy takes care of patient during the day while Faheem works.  She wanted information about in home assistance.  Discussed resources and I made a referral to the waiver program and they will follow up with Stephy and Faheem.  Stephy states patient has seen Dr. Burris for years and he has been doing telehealth visits since they cannot get patient in to the office.  Discussed medical care options in the home and provided her with the contact number for [facility] and I left a message for them to call.  ([facility] calls states that they don't have a provider that covers [facility].)  Sent patient resources and my contact number.  Stephy states they do not need an in person social work visit at this time.

## 2022-02-15 NOTE — TELEPHONE ENCOUNTER
Spoke with Stephy to get more info; she said disregard message because the ppl fro the infusion center called it in for them. But FYI she is taking 1 container of 250g 5 times a day

## 2022-02-15 NOTE — TELEPHONE ENCOUNTER
To order this we need very specific information including the name of the formula, the amount she receives, how often she receives it and how much she would need for each prescription.  This will depend upon whether they can get it monthly or weekly or what.

## 2022-02-16 NOTE — HOME HEALTH
Therapy interventions focused on providing a variety of sensory stimulation in order to improve pt.'s alertness levels.  Thermal, tactile and auditory stimulus utilized with no change in pt.'s response.  SLP educated caregiver, Stephy, on oral care related to NPO status.  Pt. unable to follow commands or participate in actively opening her mouth during oral care.  SLP noted s/s of thrush on tongue and Stephy reported she would call PCP for medication.  Stephy verbalizes understanding of oral care education.  SLP discussed Hospice consult with caregiver as pt.'s responsiveness level has not changed in over 1 week.  MSW called to initiate Hospice consult with permission from pt.'s .

## 2022-02-16 NOTE — CASE COMMUNICATION
Asked by ST Apple, to get a Hospice consult.  I called and spoke to Stephy, niece and caregiver, and she said that patient isn't getting any better and she is agreeable to the consult.  She will have Faheem, patient's , to call me tonight to let me know that he is agreeable to the consult.  There is no POA and Faheem is the legal next of kin to make decisions.   I will follow up.

## 2022-02-16 NOTE — OUTREACH NOTE
Stroke Week 2 Survey      Responses   [facility] patient discharged from? [place]   Does the patient have one of the following disease processes/diagnoses(primary or secondary)? Stroke (TIA)   Week 2 attempt successful? Yes   Call start time 1644   Call end time 1646   Discharge diagnosis Acute CVA (cerebrovascular accident)   Is patient permission given to speak with other caregiver? Yes   List who call center can speak with caregiver/sister- Stephy   Person spoke with today (if not patient) and relationship caregiver/sister- Stephy   Is the patient taking all medications as directed (includes completed medication regime)? Yes   Does the patient have a primary care provider?  Yes   Comments regarding PCP had an appt via telehealth on 2/11   Has the patient kept scheduled appointments due by today? Yes   What is the Home health agency?  [facility]   Psychosocial issues? No   Does the patient require any assistance with activities of daily living such as eating, bathing, dressing, walking, etc.? Yes   Does the patient have any residual symptoms from stroke/TIA? Yes   Residual symptoms comments unable to move or talk   Does the patient understand the diet ordered at discharge? No   What is the patient's perception of their health status since discharge? Same   Nursing interventions Nurse provided patient education   Is the patient able to teach back FAST for Stroke? Yes   Is the patient/caregiver able to teach back signs and symptoms related to disease process for when to call PCP? Yes   Is the patient/caregiver able to teach back signs and symptoms related to disease process for when to call 911? Yes   Is the patient/caregiver able to teach back the hierarchy of who to call/visit for symptoms/problems? PCP, Specialist, Home health nurse, Urgent Care, ED, 911 Yes   Week 2 call completed? Yes   Revoked No further contact(revokes)-requires comment   Is the patient interested in additional calls from an  ambulatory ?  NOTE:  applies to high risk patients requiring additional follow-up. No   Graduated/Revoked comments Per sister Stephy, family is considering Hospice, no further calls are needed.          Arely Anderson RN

## 2022-02-17 NOTE — TELEPHONE ENCOUNTER
Caller: DianaStephy    Relationship: Emergency Contact    Best call back number:    [phone] (    What medication are you requesting:     CALLER STATED THE SPEECH THERAPIST ALERTED THAT PATIENT HAS A WHITE COATING COVERING THE TONGUE    CALLER REQUESTED AN ANTIBIOTIC FOR THE SYMPTOM    What are your current symptoms:     SEE ABOVE    How long have you been experiencing symptoms:     3 -4 DAYS    Have you had these symptoms before:    [] Yes  [x] No    Have you been treated for these symptoms before:   [] Yes  [x] No    If a prescription is needed, what is your preferred pharmacy and phone number:      [place], [place]    TELEPHONE CONTACT:    [phone]    Additional notes:    N/A    DR BROOKE

## 2022-02-17 NOTE — TELEPHONE ENCOUNTER
Spoke with daughter and they don't have a nurse there today but she is agreeable that it is most likely thrush. I told her you would treat as thrush, please send to [facility] in [place].

## 2022-02-17 NOTE — CASE COMMUNICATION
"Per caregiver, patient lethargic/unable to awaken to functionally participate with PT this date. Patient caregiver states patient has been \"sleeping alot\". "

## 2022-02-17 NOTE — CASE COMMUNICATION
I received a message from Faheem, patient's spouse, requesting a Hospice evaluation.  I have contacted [facility], [place], [phone], and they will contact the caregiver, Stephy, today to set up an in home meeting/assessment.  I faxed requested information to Hospice at [phone]

## 2022-02-18 NOTE — TELEPHONE ENCOUNTER
Caller: Stephy Ortiz    Relationship: Emergency Contact    Best call back number: [phone]    Requested Prescriptions:   Requested Prescriptions     Pending Prescriptions Disp Refills   • diazePAM (VALIUM) 2 MG tablet 3 tablet 0     Sig: Administer 1 tablet per G tube Every Night.        Pharmacy where request should be sent: [id number] - [place], [place] - [address]  AT [facility] [address] - [phone]  - [phone] FX     Additional details provided by patient:   PATIENT IS COMPLETLEY OUT OF MEDICATION     Does the patient have less than a 3 day supply:  [x] Yes  [] No    Shreyas Gonzalez Rep   02/18/22 15:10 EST

## 2022-02-18 NOTE — TELEPHONE ENCOUNTER
Resent Diflucan to [facility] pharmacy, please send in her Valium to [facility]. It was last called in to  pharmacy on 02/03/22.  Per her friend Stephy, she takes the Valium through her G tube, they crush it up.

## 2022-02-18 NOTE — TELEPHONE ENCOUNTER
Caller: DianaStephy    Relationship: Emergency Contact    Best call back number: [phone]    Requested Prescriptions:   Requested Prescriptions      No prescriptions requested or ordered in this encounter        Pharmacy where request should be sent: [id number] - [place], [place] - [address] AT [facility] [address] - [phone]  - [phone] FX     Additional details provided by patient: THRUSH MEDICATION AND ALSO WANTS TO KNOW IF VALIUM IS A NARCOTIC. PLEASE ADVISE.    Does the patient have less than a 3 day supply:  [x] Yes  [] No    Shreyas Lopez Rep   02/18/22 11:22 EST

## 2022-02-21 NOTE — TELEPHONE ENCOUNTER
Caller: LALO     Relationship: HOSPICE CARE    Best call back number: [phone]    What is your medical concern? PATIENT WAS ADMITTED TO HOSPICE CARE TODAY, THEY WOULD LIKE TO KNOW IF YOU WANT TO CONTINUE CARING FOR HER OR TRANSFER CARE TO HOSPICE ?    How long has this issue been going on? 3-4 WEEKS     Is your provider already aware of this issue? N/A     Have you been treated for this issue? N/A

## 2022-02-23 NOTE — TELEPHONE ENCOUNTER
DAILY WITH [facility] CALLED TO NOTIFY PT PCP THAT PT HAS STOPPED BREATHING YESTERDAY, CPR WAS DONE ON PT AND NOW SHE IS IN ICU OVER AT [facility].    PLEASE ADVISE.  CALL BACK:[phone]

## 2022-03-18 ENCOUNTER — TELEPHONE (OUTPATIENT)
Dept: FAMILY MEDICINE CLINIC | Facility: CLINIC | Age: 57
End: 2022-03-18

## 2022-03-18 NOTE — TELEPHONE ENCOUNTER
“Please be informed that patient has passed. Patient has been marked  in the system. The date of death is: 2022    Caller: Luba Ortiz    Relationship: Emergency Contact    Best call back number: [phone]    LUBA WOULD LIKE TO TALK TO DR PATTERSON

## (undated) DEVICE — STPCK 3/WY HP M/RA W/OFF/HNDL 1050PSI STRL

## (undated) DEVICE — INTRO SHEATH FLX 7F80CM

## (undated) DEVICE — ST INF PRI SMRTSTE 20DRP 2VLV 24ML 117

## (undated) DEVICE — APPL CHLORAPREP TINTED 26ML TEAL

## (undated) DEVICE — TB SXN FRAZIER 8F STRL

## (undated) DEVICE — INTRO SHEATH ART/FEM ENGAGE .038 5F12CM

## (undated) DEVICE — NEURO SPONGES: Brand: DEROYAL

## (undated) DEVICE — DISPOSABLE BIPOLAR FORCEPS 7 3/4" (19.7CM) SCOVILLE BAYONET, INSULATED, 1.5MM TIP AND 12 FT. (3.6M) CABLE: Brand: KIRWAN

## (undated) DEVICE — SUT SILK 2/0 TIES 18IN A185H

## (undated) DEVICE — KT PEG ENDOVIVE ENFIT STD PULL 20F 1P/U

## (undated) DEVICE — SHNT BYPASS JAVID CAROTID TEMP TYP1852 17FTO10F 27.5CM

## (undated) DEVICE — ELECTRD BLD EZ CLN MOD XLNG 2.75IN

## (undated) DEVICE — TUBING, SUCTION, 1/4" X 10', STRAIGHT: Brand: MEDLINE

## (undated) DEVICE — RADIFOCUS GLIDEWIRE: Brand: GLIDEWIRE

## (undated) DEVICE — KT ORCA ORCAPOD DISP STRL

## (undated) DEVICE — GLV SURG BIOGEL LTX PF 8

## (undated) DEVICE — PK NEURO DISC 10

## (undated) DEVICE — Device

## (undated) DEVICE — DEV INFL MONARCH 25W

## (undated) DEVICE — ST ACC MICROPUNCTURE .018 TRANSLSS/SS/TP 5F/10CM 21G/7CM

## (undated) DEVICE — SUT SILK 4/0 TIES 18IN A183H

## (undated) DEVICE — SYR CONTRL LUERLOK 10CC

## (undated) DEVICE — LIMB HOLDER, WRIST/ANKLE: Brand: DEROYAL

## (undated) DEVICE — 3M™ STERI-STRIP™ REINFORCED ADHESIVE SKIN CLOSURES, R1547, 1/2 IN X 4 IN (12 MM X 100 MM), 6 STRIPS/ENVELOPE: Brand: 3M™ STERI-STRIP™

## (undated) DEVICE — CATHETER,URETHRAL,REDRUBBER,STRL,18FR: Brand: MEDLINE

## (undated) DEVICE — PROVIDES A STERILE INTERFACE BETWEEN THE OPERATING ROOM SURGICAL LAMPS (NON-STERILE) AND THE SURGEON OR NURSE (STERILE).: Brand: STERION®CLAMP COVER FABRIC

## (undated) DEVICE — SUT PROLN 6/0 BV1 D/A 30IN 8709H

## (undated) DEVICE — SYR LUERLOK 50ML

## (undated) DEVICE — EMBOSHIELD NAV6 EMBOLIC PROTECTION SYSTEM 7.2 MM X 190 CM: Brand: EMBOSHIELD  NAV6

## (undated) DEVICE — SYR LUERLOK 5CC

## (undated) DEVICE — SUT PROLN 7/0 CV BV1 24IN 8304H BX/36

## (undated) DEVICE — ADHS LIQ MASTISOL 2/3ML

## (undated) DEVICE — TB SXN FRAZIER 12F STRL

## (undated) DEVICE — SKIN PREP TRAY W/CHG: Brand: MEDLINE INDUSTRIES, INC.

## (undated) DEVICE — NDL HYPO ECLPS SFTY 25G 1 1/2IN

## (undated) DEVICE — NDL HYPO ECLPS SFTY 27G 1/2IN

## (undated) DEVICE — ROTATING HEMOSTATIC VALVE .096": Brand: RHV

## (undated) DEVICE — SPNG GZ WOVN 4X4IN 12PLY 10/BX STRL

## (undated) DEVICE — KITTNER SPONGE: Brand: DEROYAL

## (undated) DEVICE — LEX NEURO ANGIOGRAPHY: Brand: MEDLINE INDUSTRIES, INC.

## (undated) DEVICE — ST EXT IV SMARTSITE 2VLV SP M LL 5ML IV1

## (undated) DEVICE — ANGIO-SEAL VIP VASCULAR CLOSURE DEVICE: Brand: ANGIO-SEAL

## (undated) DEVICE — SUT SILK 3/0 TIES 18IN A184H

## (undated) DEVICE — CATH TEMPO 5F BER 100CM: Brand: TEMPO

## (undated) DEVICE — FOGARTY - HYDRAGRIP SURGICAL - CLAMP INSERTS: Brand: FOGARTY SOFTJAW

## (undated) DEVICE — INTENDED TO BE USED TO OCCLUDE, RETRACT AND IDENTIFY ARTERIES, VEINS, TENDONS AND NERVES IN SURGICAL PROCEDURES: Brand: STERION®  VESSEL LOOP

## (undated) DEVICE — 1 ML TUBERCULIN SYRINGE REGULAR TIP: Brand: MONOJECT

## (undated) DEVICE — THE BITE BLOCK MAXI, LATEX FREE STRAP IS USED TO PROTECT THE ENDOSCOPE INSERTION TUBE FROM BEING BITTEN BY THE PATIENT.

## (undated) DEVICE — ANTIBACTERIAL UNDYED BRAIDED (POLYGLACTIN 910), SYNTHETIC ABSORBABLE SUTURE: Brand: COATED VICRYL

## (undated) DEVICE — GLV SURG PREMIERPRO MIC LTX PF SZ8 BRN